# Patient Record
Sex: FEMALE | Race: WHITE | NOT HISPANIC OR LATINO | Employment: OTHER | URBAN - NONMETROPOLITAN AREA
[De-identification: names, ages, dates, MRNs, and addresses within clinical notes are randomized per-mention and may not be internally consistent; named-entity substitution may affect disease eponyms.]

---

## 2018-02-20 ENCOUNTER — HISTORICAL (OUTPATIENT)
Dept: ADMINISTRATIVE | Facility: HOSPITAL | Age: 83
End: 2018-02-20

## 2019-11-06 ENCOUNTER — HISTORICAL (OUTPATIENT)
Dept: ADMINISTRATIVE | Facility: HOSPITAL | Age: 84
End: 2019-11-06

## 2019-11-27 ENCOUNTER — OFFICE VISIT (OUTPATIENT)
Dept: UROLOGY | Facility: CLINIC | Age: 84
End: 2019-11-27
Payer: MEDICARE

## 2019-11-27 VITALS
RESPIRATION RATE: 16 BRPM | BODY MASS INDEX: 21.53 KG/M2 | DIASTOLIC BLOOD PRESSURE: 69 MMHG | SYSTOLIC BLOOD PRESSURE: 118 MMHG | WEIGHT: 117 LBS | HEIGHT: 62 IN

## 2019-11-27 DIAGNOSIS — N39.41 URGE INCONTINENCE: ICD-10-CM

## 2019-11-27 DIAGNOSIS — R31.29 HEMATURIA, MICROSCOPIC: Primary | ICD-10-CM

## 2019-11-27 PROCEDURE — 1159F PR MEDICATION LIST DOCUMENTED IN MEDICAL RECORD: ICD-10-PCS | Mod: S$GLB,,, | Performed by: NURSE PRACTITIONER

## 2019-11-27 PROCEDURE — 99213 OFFICE O/P EST LOW 20 MIN: CPT | Mod: S$GLB,,, | Performed by: NURSE PRACTITIONER

## 2019-11-27 PROCEDURE — 1159F MED LIST DOCD IN RCRD: CPT | Mod: S$GLB,,, | Performed by: NURSE PRACTITIONER

## 2019-11-27 PROCEDURE — 99213 PR OFFICE/OUTPT VISIT, EST, LEVL III, 20-29 MIN: ICD-10-PCS | Mod: S$GLB,,, | Performed by: NURSE PRACTITIONER

## 2019-11-27 RX ORDER — FUROSEMIDE 20 MG/1
TABLET ORAL
COMMUNITY
End: 2023-08-25

## 2019-11-27 RX ORDER — SERTRALINE HYDROCHLORIDE 50 MG/1
TABLET, FILM COATED ORAL
COMMUNITY
End: 2023-02-08

## 2019-11-27 RX ORDER — GLUCOSAMINE/CHONDROITIN/C/MANG 500-400 MG
CAPSULE ORAL
COMMUNITY
End: 2023-02-08

## 2019-11-27 RX ORDER — ROPINIROLE 1 MG/1
TABLET, FILM COATED ORAL
COMMUNITY
End: 2023-01-31 | Stop reason: SDUPTHER

## 2019-11-27 RX ORDER — NAPROXEN SODIUM 220 MG/1
TABLET, FILM COATED ORAL
COMMUNITY

## 2019-11-27 RX ORDER — BENZONATATE 100 MG/1
CAPSULE ORAL
Refills: 0 | COMMUNITY
Start: 2019-11-06 | End: 2023-06-13 | Stop reason: ALTCHOICE

## 2019-11-27 RX ORDER — CLONAZEPAM 1 MG/1
TABLET ORAL
COMMUNITY
End: 2023-08-15 | Stop reason: SINTOL

## 2019-11-27 RX ORDER — GLIMEPIRIDE 1 MG/1
TABLET ORAL
COMMUNITY
End: 2023-02-08

## 2019-11-27 RX ORDER — CLOPIDOGREL BISULFATE 75 MG/1
TABLET ORAL
COMMUNITY
End: 2023-07-27 | Stop reason: SINTOL

## 2019-11-27 RX ORDER — ZOLPIDEM TARTRATE 10 MG/1
TABLET ORAL
COMMUNITY
End: 2023-02-08

## 2019-11-27 RX ORDER — FENOFIBRATE 134 MG/1
CAPSULE ORAL
COMMUNITY
End: 2023-02-08

## 2019-11-27 NOTE — PROGRESS NOTES
Subjective:       Patient ID: Leatha Adames is a 84 y.o. female.    Chief Complaint: Other (Annual F/U-)      HPI: 84-year-old female presents for yearly re-evaluation.  Patient has a history of microscopic hematuria.  She has had a negative workup in the past.  Patient also has a history of urge incontinence.  She is on Toviaz.  States she is doing well.  Denies any pain or burning with urination.  Denies blood in her urine.  States her incontinence is controlled with Toviaz.  No other urinary complaints.  All other health problems are      Past Medical History:   Past Medical History:   Diagnosis Date    Anxiety and depression     CAD (coronary artery disease)     Elevated cholesterol     Microhematuria     Urge incontinence        Past Surgical Historical:   Past Surgical History:   Procedure Laterality Date    CHOLECYSTECTOMY      HEMORRHOID SURGERY      HYSTERECTOMY          Medications:   Medication List with Changes/Refills   Current Medications    ASPIRIN 81 MG CHEW    aspirin 81 mg chewable tablet   Chew 1 tablet every day by oral route.    BENZONATATE (TESSALON) 100 MG CAPSULE    TK 1 C PO Q 4 HOURS AS NEEDED FOR COUGH    CLONAZEPAM (KLONOPIN) 1 MG TABLET    clonazepam 1 mg tablet   Take 1 tablet every day by oral route at bedtime.    CLOPIDOGREL (PLAVIX) 75 MG TABLET    Plavix 75 mg tablet   Take 1 tablet every day by oral route.    FENOFIBRATE MICRONIZED (LOFIBRA) 134 MG CAP    Tricor 134 mg capsule   Take 1 capsule every day by oral route.    FESOTERODINE FUMARATE (TOVIAZ ORAL)    Take by mouth.    FUROSEMIDE (LASIX) 20 MG TABLET    furosemide 20 mg tablet   Take 1 tablet every day by oral route.    GLIMEPIRIDE (AMARYL) 1 MG TABLET    glimepiride 1 mg tablet   Take 1 tablet every day by oral route.    GLUCOSAMINE-CHONDROIT-VIT C-MN (GLUCOSAMINE 1500 COMPLEX) 500-400 MG CAPSULE    Glucosamine 1500 Complex 500 mg-400 mg capsule   1/2 tablet twice daily    ROPINIROLE (REQUIP) 1 MG TABLET     ropinirole 1 mg tablet   Take 1 tablet every day by oral route at bedtime.    SERTRALINE (ZOLOFT) 50 MG TABLET    sertraline 50 mg tablet   Take 1 tablet every day by oral route.    ZOLPIDEM (AMBIEN) 10 MG TAB    zolpidem 10 mg tablet   1 tablet by mouth at bedtime        Past Social History:   Social History     Socioeconomic History    Marital status:      Spouse name: Not on file    Number of children: Not on file    Years of education: Not on file    Highest education level: Not on file   Occupational History    Not on file   Social Needs    Financial resource strain: Not on file    Food insecurity:     Worry: Not on file     Inability: Not on file    Transportation needs:     Medical: Not on file     Non-medical: Not on file   Tobacco Use    Smoking status: Former Smoker   Substance and Sexual Activity    Alcohol use: Not Currently    Drug use: Not on file    Sexual activity: Not on file   Lifestyle    Physical activity:     Days per week: Not on file     Minutes per session: Not on file    Stress: Not on file   Relationships    Social connections:     Talks on phone: Not on file     Gets together: Not on file     Attends [employer] service: Not on file     Active member of club or organization: Not on file     Attends meetings of clubs or organizations: Not on file     Relationship status: Not on file   Other Topics Concern    Not on file   Social History Narrative    Not on file       Allergies:   Review of patient's allergies indicates:   Allergen Reactions    Ace inhibitors     Codeine     Hydrocodone-acetaminophen     Irbesartan     Penicillins         Family History: History reviewed. No pertinent family history.     Review of Systems:  Review of Systems   Constitutional: Negative for activity change and appetite change.   HENT: Negative for congestion and dental problem.    Respiratory: Negative for chest tightness and shortness of breath.    Cardiovascular: Negative for chest  pain.   Gastrointestinal: Negative for abdominal distention.   Genitourinary: Negative for decreased urine volume, difficulty urinating, dyspareunia, dysuria, enuresis, flank pain, frequency, genital sores, hematuria, pelvic pain and urgency.   Musculoskeletal: Negative for back pain and neck pain.   Neurological: Negative for dizziness.   Hematological: Negative for adenopathy.   Psychiatric/Behavioral: Negative for agitation, behavioral problems and confusion.       Physical Exam:  Physical Exam   Nursing note and vitals reviewed.  Constitutional: She is oriented to person, place, and time. She appears well-developed and well-nourished.   HENT:   Head: Normocephalic.   Cardiovascular: Normal rate, regular rhythm and normal heart sounds.    Pulmonary/Chest: Effort normal and breath sounds normal.   Abdominal: Soft. Bowel sounds are normal.   Neurological: She is alert and oriented to person, place, and time.   Skin: Skin is warm and dry.      Urinalysis:  White blood cells 0-5, trace leukocytes.  Red blood cells 0-3.    Assessment/Plan:   1.  Microscopic hematuria:  Negative workup in the past.  No intervention needed at this time.  Urge incontinence:  Patient is doing well on Toviaz.  Continue Toviaz as directed.  Will plan follow-up in 1 year, sooner if needed.  Problem List Items Addressed This Visit     None      Visit Diagnoses     Hematuria, microscopic    -  Primary    Relevant Orders    POCT Urinalysis (w/Micro Option)    Urge incontinence

## 2020-04-13 RX ORDER — FESOTERODINE FUMARATE 8 MG/1
8 TABLET, EXTENDED RELEASE ORAL DAILY
Qty: 90 TABLET | Refills: 1 | Status: SHIPPED | OUTPATIENT
Start: 2020-04-13 | End: 2020-10-13 | Stop reason: SDUPTHER

## 2020-10-15 RX ORDER — FESOTERODINE FUMARATE 8 MG/1
8 TABLET, EXTENDED RELEASE ORAL DAILY
Qty: 90 TABLET | Refills: 3 | Status: SHIPPED | OUTPATIENT
Start: 2020-10-15 | End: 2021-10-15

## 2021-04-23 LAB
ALBUMIN SERPL-MCNC: 4 G/DL (ref 3.4–5)
ALBUMIN/GLOB SERPL: 1.7 {RATIO}
ALP SERPL-CCNC: 24 U/L (ref 50–144)
ALT SERPL-CCNC: 12 U/L (ref 1–45)
ANION GAP SERPL CALC-SCNC: 7 MMOL/L (ref 7–16)
AST SERPL-CCNC: 30 U/L (ref 14–36)
BASOPHILS # BLD AUTO: 0.04 X10(3)/MCL (ref 0.01–0.08)
BASOPHILS NFR BLD AUTO: 0.9 % (ref 0.1–1.2)
BILIRUB SERPL-MCNC: 0.29 MG/DL (ref 0.1–1)
BUN SERPL-MCNC: 21 MG/DL (ref 7–20)
CALCIUM SERPL-MCNC: 7.6 MG/DL (ref 8.4–10.2)
CHLORIDE SERPL-SCNC: 110 MMOL/L (ref 94–110)
CHOLEST SERPL-MCNC: 111 MG/DL (ref 0–200)
CO2 SERPL-SCNC: 26 MMOL/L (ref 21–32)
CREAT SERPL-MCNC: 1.4 MG/DL (ref 0.52–1.04)
CREAT/UREA NIT SERPL: 15 (ref 12–20)
EOSINOPHIL # BLD AUTO: 0.3 X10(3)/MCL (ref 0.04–0.36)
EOSINOPHIL NFR BLD AUTO: 6.5 % (ref 0.7–7)
ERYTHROCYTE [DISTWIDTH] IN BLOOD BY AUTOMATED COUNT: 17.1 % (ref 11–14.5)
EST. AVERAGE GLUCOSE BLD GHB EST-MCNC: 84 MG/DL (ref 70–115)
GLOBULIN SER-MCNC: 2.3 G/DL (ref 2–3.9)
GLUCOSE SERPL-MCNC: 102 MGM./DL (ref 70–115)
HBA1C MFR BLD: 4.8 % (ref 4–6)
HCT VFR BLD AUTO: 31.8 % (ref 36–48)
HDLC SERPL-MCNC: 43 MG/DL (ref 40–60)
HGB BLD-MCNC: 9.5 G/DL (ref 11.8–16)
IMM GRANULOCYTES # BLD AUTO: 0 X10E3/UL (ref 0–0.03)
IMM GRANULOCYTES NFR BLD AUTO: 0 % (ref 0–0.5)
LDLC SERPL CALC-MCNC: 43.4 MG/DL (ref 30–100)
LYMPHOCYTES # BLD AUTO: 1.71 X10(3)/MCL (ref 1.16–3.74)
LYMPHOCYTES NFR BLD AUTO: 36.8 % (ref 20–55)
MCH RBC QN AUTO: 28.8 PG (ref 27–34)
MCHC RBC AUTO-ENTMCNC: 29.9 G/DL (ref 31–37)
MCV RBC AUTO: 96.4 FL (ref 79–99)
MONOCYTES # BLD AUTO: 0.39 X10(3)/MCL (ref 0.24–0.36)
MONOCYTES NFR BLD AUTO: 8.4 % (ref 4.7–12.5)
NEUTROPHILS # BLD AUTO: 2.21 X10(3)/MCL (ref 1.56–6.13)
NEUTROPHILS NFR BLD AUTO: 47.4 % (ref 37–73)
PLATELET # BLD AUTO: 289 X10(3)/MCL (ref 140–371)
PMV BLD AUTO: 9.9 FL (ref 9.4–12.4)
POTASSIUM SERPL-SCNC: 4.1 MMOL/L (ref 3.5–5.1)
PROT SERPL-MCNC: 6.3 G/DL (ref 6.3–8.2)
RBC # BLD AUTO: 3.3 X10(6)/MCL (ref 4–5.1)
SODIUM SERPL-SCNC: 143 MMOL/L (ref 135–145)
T4 FREE SERPL-MCNC: 0.71 NG/DL (ref 0.78–2.19)
TRIGL SERPL-MCNC: 130 MG/DL (ref 30–200)
TSH SERPL-ACNC: 2.71 UIU/ML (ref 0.36–3.74)
WBC # SPEC AUTO: 4.7 X10(3)/MCL (ref 4–11.5)

## 2021-05-25 ENCOUNTER — HISTORICAL (OUTPATIENT)
Dept: ADMINISTRATIVE | Facility: HOSPITAL | Age: 86
End: 2021-05-25

## 2021-12-15 ENCOUNTER — HISTORICAL (OUTPATIENT)
Dept: ADMINISTRATIVE | Facility: HOSPITAL | Age: 86
End: 2021-12-15

## 2021-12-22 ENCOUNTER — HISTORICAL (OUTPATIENT)
Dept: ADMINISTRATIVE | Facility: HOSPITAL | Age: 86
End: 2021-12-22

## 2021-12-25 ENCOUNTER — HISTORICAL (OUTPATIENT)
Dept: ADMINISTRATIVE | Facility: HOSPITAL | Age: 86
End: 2021-12-25

## 2021-12-27 ENCOUNTER — EXTERNAL HOSPITAL ADMISSION (OUTPATIENT)
Dept: ADMINISTRATIVE | Facility: CLINIC | Age: 86
End: 2021-12-27
Payer: MEDICARE

## 2021-12-27 ENCOUNTER — PATIENT OUTREACH (OUTPATIENT)
Dept: ADMINISTRATIVE | Facility: CLINIC | Age: 86
End: 2021-12-27
Payer: MEDICARE

## 2022-03-10 ENCOUNTER — TELEPHONE (OUTPATIENT)
Dept: GASTROENTEROLOGY | Facility: CLINIC | Age: 87
End: 2022-03-10
Payer: MEDICARE

## 2022-03-10 LAB
AGE: 85
ALBUMIN SERPL-MCNC: 4.4 G/DL (ref 3.4–5)
ALBUMIN/GLOB SERPL: 2 {RATIO}
ALP SERPL-CCNC: 26 U/L (ref 50–144)
ALT SERPL-CCNC: 15 U/L (ref 1–45)
ANION GAP SERPL CALC-SCNC: 6 MMOL/L (ref 2–13)
AST SERPL-CCNC: 34 U/L (ref 14–36)
BASOPHILS # BLD AUTO: 0.07 10*3/UL (ref 0.01–0.08)
BASOPHILS NFR BLD AUTO: 1 % (ref 0.1–1.2)
BILIRUB SERPL-MCNC: 0.39 MG/DL (ref 0–1)
BUN SERPL-MCNC: 30 MG/DL (ref 7–20)
CALCIUM SERPL-MCNC: 9.7 MG/DL (ref 8.4–10.2)
CHLORIDE SERPL-SCNC: 106 MMOL/L (ref 94–110)
CO2 SERPL-SCNC: 29 MMOL/L (ref 21–32)
CREAT SERPL-MCNC: 1.73 MG/DL (ref 0.52–1.04)
CREAT/UREA NIT SERPL: 17.3 (ref 12–20)
EOSINOPHIL # BLD AUTO: 0.58 10*3/UL (ref 0.04–0.36)
EOSINOPHIL NFR BLD AUTO: 8 % (ref 0.7–7)
ERYTHROCYTE [DISTWIDTH] IN BLOOD BY AUTOMATED COUNT: 13.8 % (ref 11–14.5)
EST. AVERAGE GLUCOSE BLD GHB EST-MCNC: 108 MG/DL (ref 70–115)
GLOBULIN SER-MCNC: 2.2 G/DL (ref 2–3.9)
GLUCOSE SERPL-MCNC: 138 MG/DL (ref 70–115)
HBA1C MFR BLD: 5.6 % (ref 4–6)
HCT VFR BLD AUTO: 27.8 % (ref 36–48)
HGB BLD-MCNC: 8.8 G/DL (ref 11.8–16)
IMM GRANULOCYTES # BLD AUTO: 0.01 10*3/UL (ref 0–0.03)
IMM GRANULOCYTES NFR BLD AUTO: 0.1 % (ref 0–0.5)
LYMPHOCYTES # BLD AUTO: 1.78 10*3/UL (ref 1.16–3.74)
LYMPHOCYTES NFR BLD AUTO: 24.7 % (ref 20–55)
MANUAL DIFF? (OHS): NORMAL
MCH RBC QN AUTO: 28.5 PG (ref 27–34)
MCHC RBC AUTO-ENTMCNC: 31.7 G/DL (ref 31–37)
MCV RBC AUTO: 90 FL (ref 79–99)
MONOCYTES # BLD AUTO: 0.61 10*3/UL (ref 0.24–0.36)
MONOCYTES NFR BLD AUTO: 8.4 % (ref 4.7–12.5)
NEUTROPHILS # BLD AUTO: 4.17 10*3/UL (ref 1.56–6.13)
NEUTROPHILS NFR BLD AUTO: 57.8 % (ref 37–73)
PLATELET # BLD AUTO: 250 10*3/UL (ref 140–371)
PMV BLD AUTO: 10 FL (ref 9.4–12.4)
POTASSIUM SERPL-SCNC: 4.8 MMOL/L (ref 3.5–5.1)
PROT SERPL-MCNC: 6.6 G/DL (ref 6.3–8.2)
RBC # BLD AUTO: 3.09 10*6/UL (ref 4–5.1)
SODIUM SERPL-SCNC: 141 MMOL/L (ref 135–145)
T4 FREE SERPL-MCNC: 0.72 NG/DL (ref 0.78–2.19)
TSH SERPL-ACNC: 3.55 M[IU]/L (ref 0.36–3.74)
WBC # SPEC AUTO: 7.2 10*3/UL (ref 4–11.5)

## 2022-03-10 NOTE — TELEPHONE ENCOUNTER
I spoke with Lexie to let them know that we are not seeing patients Monday afternoon. She states that this is fine and would like the patient called to schedule on another date.

## 2022-03-10 NOTE — TELEPHONE ENCOUNTER
----- Message from Tess Mcgee sent at 3/10/2022 11:12 AM CST -----  william/daughter states that pt can be in for 3:30, has 2p appt. is this okay..[phone]

## 2022-04-10 ENCOUNTER — HISTORICAL (OUTPATIENT)
Dept: ADMINISTRATIVE | Facility: HOSPITAL | Age: 87
End: 2022-04-10
Payer: MEDICARE

## 2022-04-25 VITALS
WEIGHT: 118.63 LBS | HEIGHT: 62 IN | BODY MASS INDEX: 21.83 KG/M2 | OXYGEN SATURATION: 97 % | DIASTOLIC BLOOD PRESSURE: 40 MMHG | SYSTOLIC BLOOD PRESSURE: 122 MMHG

## 2022-05-03 ENCOUNTER — HISTORICAL (OUTPATIENT)
Dept: ADMINISTRATIVE | Facility: HOSPITAL | Age: 87
End: 2022-05-03
Payer: MEDICARE

## 2022-05-03 NOTE — HISTORICAL [facility] CERNER
This is a historical note converted from [place]. Formatting and pictures may have been removed.  Please reference [place] for original formatting and attached multimedia. Chief Complaint  x3 days ago patient lifted heavy bag and heard pop in rt. elbow, still has pain  History of Present Illness  86-year-old?right-handed female?presents with complaints of right elbow pain.? She felt and heard a pop whenever she was?reaching for a pack of meat while at the grocery store.? She had some swelling and pain.? She has not lost any range of motion at the elbow.  Review of Systems  See HPI  Physical Exam  Vitals & Measurements  T:?36.6? ?C (Oral)? HR:?71(Peripheral)? BP:?154/72? SpO2:?99%?  HT:?168.00?cm? WT:?54.700?kg? BMI:?19.38?  General: Awake, alert, no acute distress  Right?arm:?Distal biceps tendon intact?but mild tenderness to palpation.? Tenderness to palpation?over the brachial radialis?proximal?insertion.? Pronation and supination equal?to the left?with?pain?with resisted?supination.? Tenderness to palpation over the medial epicondyle.  ?  X-ray right elbow:?Nondisplaced avulsion of?the medial epicondyles. ?Otherwise, unremarkable.  Assessment/Plan  1.?Right elbow pain ? M25.521  2.?Strain of right elbow ? S46.645W  3.?Nondisplaced avulsion fracture of medial epicondyle of right humerus ? S42.354A  Sling for right elbow  Ice  Return to clinic in 2?weeks.   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  Procedure/Surgical History  Colonoscopy (05/14/2021)  Esophagogastroduodenoscopy with  dilatation of esophagus (05/14/2021)  epidurol steroid injection (06.2020)  Colonoscopy (02/04/2019)  Peripheral arterial angioplasty (09/12/2018)  Esophagogastroduodenoscopy (08/20/2018)  Percutaneous transluminal coronary angioplasty (08/01/2017)  Cardiac catheterization (07/24/2008)  Cystocele repair (2001)  Mastectomy (1995)  Hemorrhoidectomy (1990)  Hysterectomy (1973)  Cholecystectomy (1958)   Medications  acetaminophen 500 mg oral tablet, 1000 mg= 2 tab(s), Oral, q6hr  aspirin 81 mg oral Delayed Release (EC) tablet, 81 mg= 1 tab(s), Oral, Daily  atorvastatin 40 mg oral tablet, 40 mg= 1 tab(s), Oral, Daily, 3 refills  calcium-vitamin D 600 mg-400 intl units oral tablet, 1 tab(s), Oral, Daily  carvedilol 3.125 mg oral tablet, See Instructions  clonazePAM 1 mg oral tablet, 1 mg= 1 tab(s), Oral, Once a day (at bedtime), 3 refills  clopidogrel 75 mg oral tablet, 75 mg= 1 tab(s), Oral, Daily, 3 refills  fenofibrate 160 mg oral tablet, 160 mg= 1 tab(s), Oral, Daily, 3 refills  fluticasone 93 mcg/inh nasal spray, 1 spray(s), Nasal, BID  furosemide 20 mg oral tablet, 20 mg= 1 tab(s), Oral, Daily  loratadine 10 mg oral tablet, 10 mg= 1 tab(s), Oral, Daily  magnesium oxide 400 mg oral tablet, 800 mg= 2 tab(s), Oral, BID, 3 refills  Nitrostat 0.4 mg sublingual tablet, 0.4 mg= 1 tab(s), SL, q5min, PRN, 2 refills  ondansetron 8 mg oral tablet, 8 mg= 1 tab(s), Oral, q8hr, PRN, 5 refills  Pantoprazole 40 mg ORAL EC-Tablet, 40 mg= 1 tab(s), Oral, Daily, 3 refills  ProAir HFA 90 mcg/inh inhalation aerosol with adapter, 1-2 puffs, INH, q4hr  ropinirole 1 mg oral tablet, 2 mg= 2 tab(s), Oral, Once a day (at bedtime), 3 refills  Systane ophthalmic solution, 1 drop, Eye-Both, Once a day (at bedtime)  Toviaz 8 mg oral tablet, extended release, 8 mg= 1 tab(s), Oral, Daily, 3 refills  traMADol 50 mg oral tablet, 100 mg= 2 tab(s), Oral, q12hr, PRN, 3 refills  venlafaxine 75 mg oral capsule, extended release, 75 mg= 1 cap(s), Oral,  Daily, 3 refills  Vitamin D 1000 intl units oral tablet  vitamin E 1000 intl units oral capsule, 1000 IntUnit= 1 cap(s), Oral, Daily  Allergies  ACE inhibitors?(Unknown)  Augmentin?(Unknown)  Avapro?(Unknown)  CeleBREX?(Unknown)  Social History  Abuse/Neglect  No, 12/15/2021  Tobacco  Never (less than 100 in lifetime), N/A, 12/15/2021  Family History  Family history is negative

## 2022-07-19 NOTE — PROGRESS NOTES
Clinic Note    Reason for visit:  The primary encounter diagnosis was Chronic diarrhea. Diagnoses of Gastroesophageal reflux disease, unspecified whether esophagitis present, Weight loss, unintentional, and Diarrhea, unspecified type were also pertinent to this visit.    PCP: Dennys Linares       HPI:  This is a 87 y.o. female who has been having diarrhea for the past few months.  It seems to significantly be affecting her.  Nausea may keep her oral intake down.  She has lost a few lb since I last saw her.  She complains of reflux been going on for more than a few months.  Son and daughter-in-law are present.  Daughter Lexie is on the phone.  She is already on pantoprazole 40 mg daily.    Review of Systems   Constitutional: Positive for unexpected weight change. Negative for chills, diaphoresis, fatigue and fever.   HENT: Negative for mouth sores, nosebleeds, postnasal drip, sore throat, trouble swallowing and voice change.    Eyes: Positive for eye dryness. Negative for pain and discharge.   Respiratory: Positive for shortness of breath. Negative for apnea, cough, choking, chest tightness and wheezing.    Cardiovascular: Negative for chest pain, palpitations, leg swelling and claudication.   Gastrointestinal: Positive for diarrhea and reflux. Negative for abdominal distention, abdominal pain, anal bleeding, blood in stool, change in bowel habit, constipation, nausea, rectal pain, vomiting, fecal incontinence and change in bowel habit.   Genitourinary: Negative for bladder incontinence, difficulty urinating, dysuria, flank pain, frequency and hematuria.   Musculoskeletal: Positive for back pain. Negative for arthralgias, joint swelling and joint deformity.   Integumentary:  Negative for color change, rash and wound.   Allergic/Immunologic: Negative for environmental allergies and food allergies.   Neurological: Positive for speech difficulty and memory loss. Negative for seizures, facial asymmetry, weakness  and headaches.   Hematological: Negative for adenopathy. Bruises/bleeds easily.   Psychiatric/Behavioral: Positive for agitation and confusion. Negative for behavioral problems, hallucinations and sleep disturbance.      Past Medical History:   Diagnosis Date    Anxiety and depression     CAD (coronary artery disease)     CKD (chronic kidney disease) stage 4, GFR 15-29 ml/min     Elevated cholesterol     High blood sugar     history of    History of blood clots     History of chest pain     Hypertension     Microhematuria     Restless leg syndrome     Urge incontinence      Past Surgical History:   Procedure Laterality Date    CHOLECYSTECTOMY      HEMORRHOID SURGERY      HYSTERECTOMY       History reviewed. No pertinent family history.  Social History     Tobacco Use    Smoking status: Former Smoker    Smokeless tobacco: Never Used   Substance Use Topics    Alcohol use: Not Currently     Review of patient's allergies indicates:   Allergen Reactions    Ace inhibitors     Codeine     Hydrocodone-acetaminophen     Irbesartan     Penicillins         Medication List with Changes/Refills   New Medications    PANTOPRAZOLE (PROTONIX) 40 MG TABLET    Take 1 tablet (40 mg total) by mouth once daily.   Current Medications    ACETAMINOPHEN (TYLENOL) 500 MG TABLET    Take 1,000 mg by mouth every 8 (eight) hours as needed for Pain.    ASPIRIN 81 MG CHEW    aspirin 81 mg chewable tablet   Chew 1 tablet every day by oral route.    BENZONATATE (TESSALON) 100 MG CAPSULE    TK 1 C PO Q 4 HOURS AS NEEDED FOR COUGH    CLONAZEPAM (KLONOPIN) 1 MG TABLET    clonazepam 1 mg tablet   Take 1 tablet every day by oral route at bedtime.    CLOPIDOGREL (PLAVIX) 75 MG TABLET    Plavix 75 mg tablet   Take 1 tablet every day by oral route.    FENOFIBRATE MICRONIZED (LOFIBRA) 134 MG CAP    Tricor 134 mg capsule   Take 1 capsule every day by oral route.    FESOTERODINE FUMARATE (TOVIAZ ORAL)    Take by mouth.    FIBER CHOICE ORAL  "   Take by mouth 4 (four) times daily as needed.    FUROSEMIDE (LASIX) 20 MG TABLET    furosemide 20 mg tablet   Take 1 tablet every day by oral route.    GLIMEPIRIDE (AMARYL) 1 MG TABLET    glimepiride 1 mg tablet   Take 1 tablet every day by oral route.    GLUCOSAMINE-CHONDROIT-VIT C--400 MG CAPSULE    Glucosamine 1500 Complex 500 mg-400 mg capsule   1/2 tablet twice daily    METOPROLOL SUCCINATE (TOPROL-XL) 50 MG 24 HR TABLET    Take 50 mg by mouth once daily.    NITROGLYCERIN (NITROSTAT) 0.4 MG SL TABLET    Place 0.4 mg under the tongue.    ROPINIROLE (REQUIP) 1 MG TABLET    ropinirole 1 mg tablet   Take 1 tablet every day by oral route at bedtime.    SERTRALINE (ZOLOFT) 50 MG TABLET    sertraline 50 mg tablet   Take 1 tablet every day by oral route.    TRAMADOL (ULTRAM) 50 MG TABLET    TAKE 2 TABLETS BY MOUTH EVERY TWELVE HOURS AS NEEDED FOR PAIN    VENLAFAXINE (EFFEXOR-XR) 75 MG 24 HR CAPSULE    Take 75 mg by mouth once daily.    ZOLPIDEM (AMBIEN) 10 MG TAB    zolpidem 10 mg tablet   1 tablet by mouth at bedtime         Vital Signs:  BP (!) 158/67 (BP Location: Left arm, Patient Position: Sitting, BP Method: Large (Automatic))   Pulse 73   Ht 5' 3" (1.6 m)   Wt 51.3 kg (113 lb)   BMI 20.02 kg/m²         Physical Exam  Vitals reviewed.   Constitutional:       General: She is awake. She is not in acute distress.     Appearance: Normal appearance. She is well-developed. She is not ill-appearing, toxic-appearing or diaphoretic.   HENT:      Head: Normocephalic and atraumatic.      Nose: Nose normal.      Mouth/Throat:      Mouth: Mucous membranes are moist.      Pharynx: Oropharynx is clear. No oropharyngeal exudate or posterior oropharyngeal erythema.   Eyes:      General: Lids are normal. Gaze aligned appropriately. No scleral icterus.        Right eye: No discharge.         Left eye: No discharge.      Extraocular Movements: Extraocular movements intact.      Conjunctiva/sclera: Conjunctivae normal. "   Neck:      Trachea: Trachea normal.   Cardiovascular:      Rate and Rhythm: Normal rate and regular rhythm.      Pulses:           Radial pulses are 2+ on the right side and 2+ on the left side.   Pulmonary:      Effort: Pulmonary effort is normal. No respiratory distress.      Breath sounds: Normal breath sounds. No stridor. No wheezing or rhonchi.   Chest:      Chest wall: No tenderness.   Abdominal:      General: Bowel sounds are normal. There is no distension.      Palpations: Abdomen is soft. There is no fluid wave, hepatomegaly or mass.      Tenderness: There is no abdominal tenderness. There is no guarding or rebound.   Musculoskeletal:         General: No tenderness or deformity.      Cervical back: Full passive range of motion without pain and neck supple. No tenderness.      Right lower leg: No edema.      Left lower leg: No edema.   Lymphadenopathy:      Cervical: No cervical adenopathy.   Skin:     General: Skin is warm and dry.      Capillary Refill: Capillary refill takes less than 2 seconds.      Coloration: Skin is not cyanotic, jaundiced or pale.      Findings: No rash.   Neurological:      General: No focal deficit present.      Mental Status: She is alert and oriented to person, place, and time.      Cranial Nerves: No facial asymmetry.      Motor: No tremor.   Psychiatric:         Attention and Perception: Attention normal.         Mood and Affect: Mood and affect normal.         Speech: Speech normal.         Behavior: Behavior normal. Behavior is cooperative.            All of the data above and below has been reviewed by myself and any further interpretations will be reflected in the assessment and plan.   The data includes review of external notes, and independent interpretation of lab results, procedures, x-rays, and imaging reports.      Assessment:  Chronic diarrhea  -     CT Abdomen Pelvis  Without Contrast; Future    Gastroesophageal reflux disease, unspecified whether esophagitis  present  -     pantoprazole (PROTONIX) 40 MG tablet; Take 1 tablet (40 mg total) by mouth once daily.  Dispense: 90 tablet; Refill: 4  -     CT Abdomen Pelvis  Without Contrast; Future    Weight loss, unintentional  -     CT Abdomen Pelvis  Without Contrast; Future    Diarrhea, unspecified type  -     CT Abdomen Pelvis  Without Contrast; Future           At this point the patient had a fairly recent upper and lower endoscopy.  Tumor seems much less likely on the differential.  She did have 3 tubular adenomas that were removed.  I would like to try to minimize interventions been ensure there is no harm occurring.  Begin using squatty potty.  Will get CT scan of the abdomen and pelvis with oral contrast.  No IV contrast given stage 3 kidney disease.  In the meantime we will try to control her symptoms with that low of a dose of Imodium as she needs.    Next step would be stool testing if otherwise unrevealing and if the loperamide is not helping.  I agree with the fiber for now.  I agree with holding the magnesium for now.    Recommendations:  Begin loperamide 2 mg by mouth twice a day and decrease dose if stool becomes firm or hard.    Schedule CT scan.    Begin using Squatty Potty with each restroom visit at home.      Please notify my office if you have not been contacted within two weeks after any procedures, submitting any samples (biopsies, blood, stool, urine, etc.) or after any imaging (X-ray, CT, MRI, etc.).     Risks, benefits, and alternatives of medical management, any associated procedures, and/or treatment discussed with the patient. Patient given opportunity to ask questions and voices understanding. Patient has elected to proceed with the recommended care modalities as discussed.    No follow-ups on file.    Order summary:  Orders Placed This Encounter   Procedures    CT Abdomen Pelvis  Without Contrast        Instructed patient to notify my office if they have not been contacted within two weeks  after any procedures, submitting any samples (biopsies, blood, stool, urine, etc.) or after any imaging (X-ray, CT, MRI, etc.).     Yvette Morgan MD    This document may have been created using a voice recognition transcribing system. Incorrect words or phrases may have been missed during proofreading. Please interpret accordingly or contact me for clarification.

## 2022-07-20 ENCOUNTER — OFFICE VISIT (OUTPATIENT)
Dept: GASTROENTEROLOGY | Facility: CLINIC | Age: 87
End: 2022-07-20
Payer: MEDICARE

## 2022-07-20 VITALS
HEIGHT: 63 IN | DIASTOLIC BLOOD PRESSURE: 67 MMHG | SYSTOLIC BLOOD PRESSURE: 158 MMHG | HEART RATE: 73 BPM | WEIGHT: 113 LBS | BODY MASS INDEX: 20.02 KG/M2

## 2022-07-20 DIAGNOSIS — K21.9 GASTROESOPHAGEAL REFLUX DISEASE, UNSPECIFIED WHETHER ESOPHAGITIS PRESENT: ICD-10-CM

## 2022-07-20 DIAGNOSIS — K52.9 CHRONIC DIARRHEA: Primary | ICD-10-CM

## 2022-07-20 DIAGNOSIS — R19.7 DIARRHEA, UNSPECIFIED TYPE: ICD-10-CM

## 2022-07-20 DIAGNOSIS — R63.4 WEIGHT LOSS, UNINTENTIONAL: ICD-10-CM

## 2022-07-20 PROCEDURE — 99203 PR OFFICE/OUTPT VISIT, NEW, LEVL III, 30-44 MIN: ICD-10-PCS | Mod: S$GLB,,, | Performed by: INTERNAL MEDICINE

## 2022-07-20 PROCEDURE — 99203 OFFICE O/P NEW LOW 30 MIN: CPT | Mod: S$GLB,,, | Performed by: INTERNAL MEDICINE

## 2022-07-20 RX ORDER — TRAMADOL HYDROCHLORIDE 50 MG/1
TABLET ORAL
COMMUNITY
Start: 2022-07-15 | End: 2024-01-02

## 2022-07-20 RX ORDER — METOPROLOL SUCCINATE 50 MG/1
50 TABLET, EXTENDED RELEASE ORAL DAILY
COMMUNITY
Start: 2022-04-04 | End: 2023-02-08

## 2022-07-20 RX ORDER — NITROGLYCERIN 0.4 MG/1
0.4 TABLET SUBLINGUAL
COMMUNITY
Start: 2021-05-27

## 2022-07-20 RX ORDER — VENLAFAXINE HYDROCHLORIDE 75 MG/1
75 CAPSULE, EXTENDED RELEASE ORAL DAILY
COMMUNITY
End: 2023-05-08 | Stop reason: SDUPTHER

## 2022-07-20 RX ORDER — ACETAMINOPHEN 500 MG
1000 TABLET ORAL EVERY 8 HOURS PRN
COMMUNITY

## 2022-07-20 RX ORDER — PANTOPRAZOLE SODIUM 40 MG/1
40 TABLET, DELAYED RELEASE ORAL DAILY
Qty: 90 TABLET | Refills: 4 | Status: SHIPPED | OUTPATIENT
Start: 2022-07-20 | End: 2023-02-14 | Stop reason: SDUPTHER

## 2022-07-20 NOTE — LETTER
July 20, 2022        Dennys Linares MD  [address]  [address]   [place] [address]             [facility]  [address] [address]  Phone: [phone]  Fax: [phone]   Patient: Leatha Adames   MR Number: [id number]   YOB: 1935   Date of Visit: 7/20/2022       Dear Dr. Linares:    Thank you for referring Leatha Adames to me for evaluation. Attached you will find relevant portions of my assessment and plan of care.    If you have questions, please do not hesitate to call me. I look forward to following Leatha Adames along with you.    Sincerely,      Yvette Morgan MD            CC  No Recipients    Enclosure

## 2022-07-20 NOTE — PATIENT INSTRUCTIONS
Begin using Squatty Potty with each restroom visit at home.      Please notify my office if you have not been contacted within two weeks after any procedures, submitting any samples (biopsies, blood, stool, urine, etc.) or after any imaging (X-ray, CT, MRI, etc.).

## 2022-08-12 ENCOUNTER — TELEPHONE (OUTPATIENT)
Dept: GASTROENTEROLOGY | Facility: CLINIC | Age: 87
End: 2022-08-12
Payer: MEDICARE

## 2022-08-12 NOTE — TELEPHONE ENCOUNTER
CT AP w/o: wnl.  Notify patient that her CT look well (although we know it has some limitations without contrast).  How are her GI Sx with the interventions we discussed in our last OV? Let me know.  ABRIL

## 2022-08-12 NOTE — TELEPHONE ENCOUNTER
"(Begin loperamide 2 mg by mouth twice a day and decrease dose if stool becomes firm or hard.   Schedule CT scan.   Begin using Squatty Potty with each restroom visit at home)         I spoke with the patient and her daughter.  The patient reports that she is using her squatty potty and taking imodium bid.  She reports that she is having one "normal" BM every morning, then 2 watery BMs later in the day.  She states that this is much better than she was doing before seeing Dr. Morgan. I let her know that someone would contact her if Dr. Morgan had any further recommendations.  KDL, CMA  "

## 2022-08-13 NOTE — TELEPHONE ENCOUNTER
Recommend making next available follow-up office visit with me in notify me sooner if any issues.  NBP

## 2022-09-19 NOTE — TELEPHONE ENCOUNTER
----- Message from Tess Mcgee sent at 9/19/2022 10:03 AM CDT -----  brittany//daughter states that pt diahrrhea has gotten worse. please advise...[phone] (home)

## 2022-09-19 NOTE — TELEPHONE ENCOUNTER
Okay to try cholestyramine once daily for 1 week.  If does not help after that time that unlikely to help and may discontinue it.  Otherwise she should be taking fiber and slowly increase the dose as well as the Imodium twice a day and squatty potty use.  Send me an update on the patient in 1-2 weeks.  ABRIL

## 2022-09-19 NOTE — TELEPHONE ENCOUNTER
Patient daughter called stated patient is taking imodium 2 a day as advised by you with no results patient still having diarrhea. Daughter found that patient does have cholestyramine on hand prescribed by another doctor at some point. Medicine exp. Is not until 05/2023 so daughter wants to know if she can try this med. Said her Aunt is also on this and says it works great for diarrhea.

## 2022-09-20 NOTE — TELEPHONE ENCOUNTER
Spoke to patients daughter Arleth gave instructions per Dr. Morgan prior message. Daughter verbalized understanding.

## 2022-09-28 NOTE — TELEPHONE ENCOUNTER
Pt has been using the squatty potty. Pt has switched cholestyramine to 1 pack BID because that is what the directions say on the box.   Pt has only had diarrhea once this AM since switching to BID.

## 2022-09-29 NOTE — TELEPHONE ENCOUNTER
I called pt's phone to discuss recommendations. No answer. LVM asking her to call our office back.

## 2022-09-29 NOTE — TELEPHONE ENCOUNTER
That is good she is getting some relief. The next step is to time the cholestyramine to avoid her other medicines two hours before and 4 hours after the cholestyramine as it may affect how some of her other medicines are absorbed.  There is also a tablet version of cholestyramine if she prefers.  I would still recommend the fiber with the goal of minimizing cholestyramine use given the specifics on its timing with other medicines in the longterm. Keep her follow up OV and notify me sooner if any issues arise.  ABRIL

## 2022-10-03 NOTE — TELEPHONE ENCOUNTER
Called placed again to pt's phone to discuss recommendations. No answer. LVM. Will attempt to call again at a later date. If no answer will send letter to pt.

## 2022-10-03 NOTE — TELEPHONE ENCOUNTER
Reached out to pt daughter, Lisset, that was returning our call. Reviewed NBP instructions per previous note. Daughter voiced understanding. She states pt would prefer the tablet version and would like them sent to [facility]. Please confirm timing of when tablets should be taken compared to the powder. - VL

## 2022-10-05 RX ORDER — MONTELUKAST SODIUM 4 MG/1
1 TABLET, CHEWABLE ORAL 2 TIMES DAILY
Qty: 180 TABLET | Refills: 0 | Status: SHIPPED | OUTPATIENT
Start: 2022-10-05 | End: 2023-02-08

## 2022-10-05 RX ORDER — MONTELUKAST SODIUM 4 MG/1
1 TABLET, CHEWABLE ORAL 2 TIMES DAILY
Qty: 180 TABLET | Refills: 0 | Status: SHIPPED | OUTPATIENT
Start: 2022-10-05 | End: 2022-10-05 | Stop reason: SDUPTHER

## 2022-10-05 NOTE — TELEPHONE ENCOUNTER
Colestipol BID was sent to pharmacy.  Same medicine timing restrictions as cholestyramine (see order).    NBP

## 2023-01-17 DIAGNOSIS — D63.1 ANEMIA OF CHRONIC RENAL FAILURE, STAGE 4 (SEVERE): ICD-10-CM

## 2023-01-17 DIAGNOSIS — N18.4 ANEMIA OF CHRONIC RENAL FAILURE, STAGE 4 (SEVERE): ICD-10-CM

## 2023-01-17 DIAGNOSIS — N18.4 CHRONIC KIDNEY DISEASE, STAGE IV (SEVERE): ICD-10-CM

## 2023-01-17 PROBLEM — N18.9 ANEMIA OF CHRONIC RENAL FAILURE: Status: ACTIVE | Noted: 2023-01-17

## 2023-01-31 ENCOUNTER — TELEPHONE (OUTPATIENT)
Dept: FAMILY MEDICINE | Facility: CLINIC | Age: 88
End: 2023-01-31
Payer: MEDICARE

## 2023-01-31 DIAGNOSIS — G25.81 RLS (RESTLESS LEGS SYNDROME): Primary | ICD-10-CM

## 2023-01-31 RX ORDER — ROPINIROLE 1 MG/1
TABLET, FILM COATED ORAL
Status: CANCELLED | OUTPATIENT
Start: 2023-01-31

## 2023-01-31 RX ORDER — ROPINIROLE 1 MG/1
2 TABLET, FILM COATED ORAL NIGHTLY
Qty: 90 TABLET | Refills: 3 | Status: SHIPPED | OUTPATIENT
Start: 2023-01-31 | End: 2023-08-07

## 2023-01-31 NOTE — TELEPHONE ENCOUNTER
----- Message from Lindsey Dixon sent at 1/31/2023  2:40 PM CST -----  Regarding: refill  Refill on   ropinirole 1 mg oral tablet sent to [facility] [phone]  [phone]

## 2023-01-31 NOTE — TELEPHONE ENCOUNTER
----- Message from Lindsey Dixon sent at 1/31/2023  2:40 PM CST -----  Regarding: refill  Refill on   ropinirole 1 mg oral tablet [facility] to [facility] [phone]  [phone]

## 2023-01-31 NOTE — TELEPHONE ENCOUNTER
----- Message from Lindsey Dixon sent at 1/31/2023  2:40 PM CST -----  Regarding: refill  Refill on   ropinirole 1 mg oral tablet sent to [facility] [phone]  [phone]

## 2023-02-06 ENCOUNTER — HOSPITAL ENCOUNTER (EMERGENCY)
Facility: HOSPITAL | Age: 88
Discharge: HOME | End: 2023-02-06
Attending: FAMILY MEDICINE
Payer: MEDICARE

## 2023-02-06 VITALS
HEIGHT: 60 IN | OXYGEN SATURATION: 98 % | RESPIRATION RATE: 18 BRPM | DIASTOLIC BLOOD PRESSURE: 57 MMHG | TEMPERATURE: 98 F | SYSTOLIC BLOOD PRESSURE: 150 MMHG | WEIGHT: 110 LBS | BODY MASS INDEX: 21.6 KG/M2 | HEART RATE: 54 BPM

## 2023-02-06 DIAGNOSIS — S00.83XA CONTUSION OF FOREHEAD, INITIAL ENCOUNTER: ICD-10-CM

## 2023-02-06 DIAGNOSIS — S01.01XA LACERATION OF SCALP, INITIAL ENCOUNTER: Primary | ICD-10-CM
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
## 2023-02-06 DIAGNOSIS — S00.03XA CONTUSION OF SCALP, INITIAL ENCOUNTER: ICD-10-CM

## 2023-02-06 PROCEDURE — 25000003 PHARM REV CODE 250

## 2023-02-06 PROCEDURE — 12002 RPR S/N/AX/GEN/TRNK2.6-7.5CM: CPT

## 2023-02-06 PROCEDURE — 99284 EMERGENCY DEPT VISIT MOD MDM: CPT | Mod: 25

## 2023-02-06 RX ORDER — LIDOCAINE HYDROCHLORIDE 10 MG/ML
INJECTION INFILTRATION; PERINEURAL
Status: COMPLETED
Start: 2023-02-06 | End: 2023-02-06

## 2023-02-06 RX ADMIN — LIDOCAINE HYDROCHLORIDE: 10 INJECTION, SOLUTION INFILTRATION; PERINEURAL at 03:02

## 2023-02-06 NOTE — ED PROVIDER NOTES
Encounter Date: 2/6/2023       History     Chief Complaint   Patient presents with    Fall     Laceration to head no lost of loc 5cm lac to head pt on plavix     87 y/o WF presents to ER past tripping on carpet and hitting head on step stool causing a large scalp laceration. Denies LOC. Denies any other injuries. No N,V or visual c/o.Denies neck pain. No decreased sensation or strength.     The history is provided by the patient.   Review of patient's allergies indicates:   Allergen Reactions    Ace inhibitors     Codeine     Hydrocodone-acetaminophen     Irbesartan     Penicillins      Past Medical History:   Diagnosis Date    Anxiety and depression     CAD (coronary artery disease)     CKD (chronic kidney disease) stage 4, GFR 15-29 ml/min     Elevated cholesterol     High blood sugar     history of    History of blood clots     History of chest pain     Hypertension     Microhematuria     Restless leg syndrome     Urge incontinence      Past Surgical History:   Procedure Laterality Date    CHOLECYSTECTOMY      HEMORRHOID SURGERY      HYSTERECTOMY       No family history on file.  Social History     Tobacco Use    Smoking status: Former    Smokeless tobacco: Never   Substance Use Topics    Alcohol use: Not Currently     Review of Systems   HENT:          5.5 cm scalp laceration to left fronto-parietal region with bleeding. Alos with contusion with mild swelling and tenderness to right supraorbital region. Skin intact.    Respiratory:  Negative for shortness of breath.    Cardiovascular:  Negative for chest pain.   Musculoskeletal:  Negative for neck pain.   Skin:  Positive for wound.   Neurological:  Negative for dizziness, weakness and numbness.   All other systems reviewed and are negative.    Physical Exam     Initial Vitals   BP Pulse Resp Temp SpO2   -- -- -- -- --      MAP       --         Physical Exam    Constitutional: She appears well-developed and well-nourished.   HENT:   Head: Normocephalic.   Right  Ear: External ear normal.   Left Ear: External ear normal.   Nose: Nose normal.   Mouth/Throat: Oropharynx is clear and moist.   5.5 cm left fronto-parietal region laceration with some bleeding. Right supraorbital contusion with mild swelling and tenderness. Skin intact.    Eyes: EOM are normal. Pupils are equal, round, and reactive to light.   S/P cataract surgery O.U.    Neck: Neck supple.   Normal range of motion.  Cardiovascular:  Normal rate, regular rhythm and normal heart sounds.           Pulmonary/Chest: Breath sounds normal.   Abdominal: Abdomen is soft. Bowel sounds are normal. There is no abdominal tenderness. There is no rebound and no guarding.   Musculoskeletal:         General: No tenderness. Normal range of motion.      Cervical back: Normal range of motion and neck supple.     Neurological: She is alert and oriented to person, place, and time. She has normal strength.   Skin: Skin is warm and dry.   Psychiatric: She has a normal mood and affect. Thought content normal.       ED Course   Lac Repair    Date/Time: 2/6/2023 2:27 AM  Performed by: Jay Salazar MD  Authorized by: Jay Salazar MD     Consent:     Consent obtained:  Verbal    Consent given by:  Patient    Risks discussed:  Infection, pain and poor wound healing  Anesthesia:     Anesthesia method:  Local infiltration    Local anesthetic:  Lidocaine 1% WITH epi  Laceration details:     Location:  Scalp    Scalp location:  L parietal    Length (cm):  5.5    Depth (mm):  1  Pre-procedure details:     Preparation:  Patient was prepped and draped in usual sterile fashion  Exploration:     Limited defect created (wound extended): yes      Hemostasis achieved with:  Epinephrine    Imaging obtained: x-ray      Imaging outcome: foreign body not noted      Wound exploration: entire depth of wound visualized      Contaminated: no    Treatment:     Area cleansed with:  Saline    Visualized foreign bodies/material removed: no      Debridement:   None    Undermining:  None  Skin repair:     Repair method:  Staples    Number of staples:  18  Approximation:     Approximation:  Close  Repair type:     Repair type:  Intermediate  Post-procedure details:     Dressing:  Open (no dressing)    Procedure completion:  Tolerated well, no immediate complications  Labs Reviewed - No data to display       Imaging Results              CT Head Without Contrast (Final result)  Result time 02/06/23 03:29:49      Final result by Albert Pardo MD (02/06/23 03:29:49)                   Impression:      1.  Scalp cephalohematoma.  No acute intracranial abnormality.      Electronically signed by: Albert Pardo  Date:    02/06/2023  Time:    03:29               Narrative:    EXAMINATION:  CT HEAD WITHOUT CONTRAST    CLINICAL HISTORY:  Head trauma, GCS=15, scalp hematoma (Ped 0-1y);Facial trauma, blunt;    COMPARISON:  09/23/2017    FINDINGS:  A scalp cephalohematoma is noted on the left side of the vertex.  The bony calvarium appears intact.  Mild generalized atrophy is present and there are faint areas of decreased attenuation involving the periventricular deep white matter on both sides compatible with mild chronic ischemic disease.  No acute intracranial hemorrhage, dominant wedge shaped infarct, or mass effect is seen. No abnormal extra-axial focal fluid collection is identified. The ventricles are normal in size and position.  Gray-white differentiation is well-maintained.  The posterior fossa is grossly unremarkable. No abnormal enhancement is seen following contrast.    The visualized paranasal sinuses and mastoids are clear.  .                                       Medications   LIDOcaine HCL 10 mg/ml (1%) 10 mg/mL (1 %) injection (has no administration in time range)                              Clinical Impression:   Final diagnoses:  [S01.01XA] Laceration of scalp, initial encounter (Primary)  [W19.XXXA] Fall, initial encounter  [S00.03XA] Contusion of scalp, initial  encounter  [S00.83XA] Contusion of forehead, initial encounter        ED Disposition Condition    Discharge Stable          ED Prescriptions    None       Follow-up Information       Follow up With Specialties Details Why Contact Info    Dennys Linares MD Family Medicine In 2 days As needed, If symptoms worsen [address] [facility] [id number]  [phone]               Jay Salazar MD  02/06/23 [id number]

## 2023-02-07 ENCOUNTER — CLINICAL SUPPORT (OUTPATIENT)
Dept: INFUSION THERAPY | Facility: HOSPITAL | Age: 88
End: 2023-02-07
Attending: FAMILY MEDICINE
Payer: MEDICARE

## 2023-02-07 VITALS
OXYGEN SATURATION: 97 % | RESPIRATION RATE: 18 BRPM | HEART RATE: 64 BPM | SYSTOLIC BLOOD PRESSURE: 135 MMHG | DIASTOLIC BLOOD PRESSURE: 51 MMHG | TEMPERATURE: 99 F

## 2023-02-07 DIAGNOSIS — D63.1 ANEMIA OF CHRONIC RENAL FAILURE, STAGE 4 (SEVERE): ICD-10-CM

## 2023-02-07 DIAGNOSIS — N18.4 ANEMIA OF CHRONIC RENAL FAILURE, STAGE 4 (SEVERE): ICD-10-CM

## 2023-02-07 DIAGNOSIS — N18.4 CHRONIC KIDNEY DISEASE, STAGE IV (SEVERE): Primary | ICD-10-CM

## 2023-02-07 PROCEDURE — 63600175 PHARM REV CODE 636 W HCPCS: Mod: JG,EC

## 2023-02-07 PROCEDURE — 96372 THER/PROPH/DIAG INJ SC/IM: CPT

## 2023-02-07 RX ADMIN — EPOETIN ALFA-EPBX 20000 UNITS: 10000 INJECTION, SOLUTION INTRAVENOUS; SUBCUTANEOUS at 12:02

## 2023-02-07 NOTE — PLAN OF CARE
Injection given in abdomen x 2, Patient tolerated well, No s/s of distress noted, Stable condition

## 2023-02-08 ENCOUNTER — OFFICE VISIT (OUTPATIENT)
Dept: FAMILY MEDICINE | Facility: CLINIC | Age: 88
End: 2023-02-08
Payer: MEDICARE

## 2023-02-08 VITALS
HEART RATE: 74 BPM | HEIGHT: 62 IN | TEMPERATURE: 99 F | WEIGHT: 112 LBS | SYSTOLIC BLOOD PRESSURE: 106 MMHG | OXYGEN SATURATION: 98 % | DIASTOLIC BLOOD PRESSURE: 40 MMHG | BODY MASS INDEX: 20.61 KG/M2

## 2023-02-08 DIAGNOSIS — R07.89 LEFT-SIDED CHEST WALL PAIN: ICD-10-CM

## 2023-02-08 DIAGNOSIS — I50.32 CHRONIC DIASTOLIC (CONGESTIVE) HEART FAILURE: ICD-10-CM

## 2023-02-08 DIAGNOSIS — I73.9 PERIPHERAL VASCULAR DISEASE, UNSPECIFIED: ICD-10-CM

## 2023-02-08 DIAGNOSIS — F33.41 RECURRENT MAJOR DEPRESSIVE DISORDER, IN PARTIAL REMISSION: ICD-10-CM

## 2023-02-08 DIAGNOSIS — N18.4 ANEMIA OF CHRONIC RENAL FAILURE, STAGE 4 (SEVERE): ICD-10-CM

## 2023-02-08 DIAGNOSIS — N18.4 CHRONIC KIDNEY DISEASE, STAGE IV (SEVERE): ICD-10-CM

## 2023-02-08 DIAGNOSIS — I20.9 ANGINA PECTORIS: ICD-10-CM

## 2023-02-08 DIAGNOSIS — M79.642 LEFT HAND PAIN: ICD-10-CM

## 2023-02-08 DIAGNOSIS — S01.01XD LACERATION OF SCALP, SUBSEQUENT ENCOUNTER: ICD-10-CM

## 2023-02-08 DIAGNOSIS — M81.0 AGE-RELATED OSTEOPOROSIS WITHOUT CURRENT PATHOLOGICAL FRACTURE: ICD-10-CM

## 2023-02-08 DIAGNOSIS — E11.59 TYPE 2 DIABETES MELLITUS WITH OTHER CIRCULATORY COMPLICATIONS: Primary | ICD-10-CM

## 2023-02-08 DIAGNOSIS — D63.1 ANEMIA OF CHRONIC RENAL FAILURE, STAGE 4 (SEVERE): ICD-10-CM

## 2023-02-08 DIAGNOSIS — M46.02 SPINAL ENTHESOPATHY, CERVICAL REGION: ICD-10-CM

## 2023-02-08 PROBLEM — S01.01XA SCALP LACERATION: Status: ACTIVE | Noted: 2023-02-08

## 2023-02-08 PROCEDURE — 99214 OFFICE O/P EST MOD 30 MIN: CPT | Mod: ,,, | Performed by: FAMILY MEDICINE

## 2023-02-08 PROCEDURE — 99214 PR OFFICE/OUTPT VISIT, EST, LEVL IV, 30-39 MIN: ICD-10-PCS | Mod: ,,, | Performed by: FAMILY MEDICINE

## 2023-02-08 RX ORDER — LORATADINE 10 MG/1
10 TABLET ORAL DAILY
COMMUNITY

## 2023-02-08 RX ORDER — CHOLESTYRAMINE 4 G/9G
1 POWDER, FOR SUSPENSION ORAL 2 TIMES DAILY
COMMUNITY
Start: 2023-01-25 | End: 2023-08-15

## 2023-02-08 RX ORDER — FENOFIBRATE 160 MG/1
160 TABLET ORAL
COMMUNITY
Start: 2022-11-28 | End: 2023-02-08

## 2023-02-08 RX ORDER — CALCITRIOL 0.5 UG/1
0.5 CAPSULE ORAL DAILY
COMMUNITY
Start: 2023-02-01 | End: 2023-11-13

## 2023-02-08 RX ORDER — VITAMIN E 268 MG
400 CAPSULE ORAL 2 TIMES DAILY
COMMUNITY

## 2023-02-08 RX ORDER — ATORVASTATIN CALCIUM 40 MG/1
40 TABLET, FILM COATED ORAL NIGHTLY
COMMUNITY
Start: 2022-11-28 | End: 2023-09-05

## 2023-02-08 RX ORDER — METOPROLOL SUCCINATE 25 MG/1
25 TABLET, EXTENDED RELEASE ORAL
COMMUNITY
Start: 2022-08-25 | End: 2023-05-08

## 2023-02-08 RX ORDER — FESOTERODINE FUMARATE 8 MG/1
1 TABLET, FILM COATED, EXTENDED RELEASE ORAL
COMMUNITY
Start: 2022-11-01 | End: 2023-07-27 | Stop reason: CLARIF

## 2023-02-08 RX ORDER — CHOLECALCIFEROL (VITAMIN D3) 25 MCG
1000 TABLET ORAL DAILY
COMMUNITY

## 2023-02-08 RX ORDER — ONDANSETRON HYDROCHLORIDE 8 MG/1
8 TABLET, FILM COATED ORAL
COMMUNITY
Start: 2022-01-03 | End: 2023-08-15

## 2023-02-08 RX ORDER — ISOSORBIDE MONONITRATE 30 MG/1
30 TABLET, EXTENDED RELEASE ORAL
COMMUNITY
Start: 2022-11-17

## 2023-02-08 RX ORDER — TRAMADOL HYDROCHLORIDE 50 MG/1
50 TABLET ORAL
COMMUNITY
Start: 2022-11-08 | End: 2023-02-08

## 2023-02-08 NOTE — PROGRESS NOTES
"SUBJECTIVE:  HPI    Leatha Adames is a 88 y.o. female here for Follow-up.  She recently had a fall at home and sustained an extensive scalp laceration requiring numerous sutures in the emergency room.  CT scan of the head revealed a scalp cephalohematoma.    She complains of some left chest wall pain ever since her fall which occurred 5 days ago.  He also complains of some left hand pain and bruising.    She received erythropoietin injection at the hospital yesterday.  However, she is not receiving intravenous iron.    She needs a referral for diabetic eye exam.    She has some ongoing left posterior knee pain and was recently discovered to have some PVD by Dr. Restrepo.    Her daughter also inquires about receiving Prolia injections.      Curts allergies, medications, history, and problem list were updated as appropriate.    ROS:  Pertinent ROS as above, otherwise negative    OBJECTIVE:  Vital signs  Visit Vitals  BP (!) 106/40 (BP Location: Right arm, Patient Position: Sitting)   Pulse 74   Temp 99.3 °F (37.4 °C) (Oral)   Ht 5' 1.81" (1.57 m)   Wt 50.8 kg (111 lb 15.9 oz)   SpO2 98%   BMI 20.61 kg/m²          PHYSICAL EXAM:  General:  Awake, alert, no acute distress, frail appearing   Head:  Scalp laceration well approximated without signs of infection with numerous staples.  Right periorbital ecchymoses and mild nasal swelling.    Chest: Tenderness to palpation over the left posterolateral chest wall.  Left hand:  Bruising to multiple fingers in the right hand.      ASSESSMENT/PLAN:  1. Type 2 diabetes mellitus with other circulatory complications  -     Ambulatory referral/consult to Ophthalmology; Future; Expected date: 02/15/2023  -     Hemoglobin A1C; Future; Expected date: 02/08/2023    2. Laceration of scalp, subsequent encounter  Assessment & Plan:  Staples out in 1 week on return to clinic      3. Left-sided chest wall pain  Assessment & Plan:  Chest x-ray and rib series of the left ribs    Orders:  -     " X-Ray Chest PA And Lateral; Future; Expected date: 02/08/2023  -     X-Ray Ribs 2 View Left; Future; Expected date: 02/08/2023    4. Left hand pain  -     X-Ray Hand Complete Left; Future; Expected date: 02/08/2023    5. Spinal enthesopathy, cervical region    6. Recurrent major depressive disorder, in partial remission    7. Chronic diastolic (congestive) heart failure    8. Angina pectoris    9. Peripheral vascular disease, unspecified    10. Chronic kidney disease, stage IV (severe)  -     Comprehensive Metabolic Panel; Future; Expected date: 02/08/2023    11. Anemia of chronic renal failure, stage 4 (severe)  Assessment & Plan:  Check iron studies with upcoming lab work.  She may need IV iron    Orders:  -     Ferritin; Future; Expected date: 02/08/2023  -     Iron and TIBC; Future; Expected date: 02/08/2023  -     CBC Auto Differential; Future; Expected date: 02/08/2023    12. Age-related osteoporosis without current pathological fracture  Assessment & Plan:  On Prolia      Other orders  -     denosumab (PROLIA) injection 60 mg            Follow Up:  Follow up for as scheduled.

## 2023-02-09 ENCOUNTER — HOSPITAL ENCOUNTER (OUTPATIENT)
Dept: RADIOLOGY | Facility: HOSPITAL | Age: 88
Discharge: HOME | End: 2023-02-09
Attending: FAMILY MEDICINE
Payer: MEDICARE

## 2023-02-09 ENCOUNTER — TELEPHONE (OUTPATIENT)
Dept: FAMILY MEDICINE | Facility: CLINIC | Age: 88
End: 2023-02-09
Payer: MEDICARE

## 2023-02-09 DIAGNOSIS — R07.89 LEFT-SIDED CHEST WALL PAIN: ICD-10-CM

## 2023-02-09 DIAGNOSIS — M79.642 LEFT HAND PAIN: ICD-10-CM

## 2023-02-09 PROCEDURE — 73130 X-RAY EXAM OF HAND: CPT | Mod: TC,LT

## 2023-02-09 PROCEDURE — 71046 X-RAY EXAM CHEST 2 VIEWS: CPT | Mod: TC

## 2023-02-09 PROCEDURE — 71100 X-RAY EXAM RIBS UNI 2 VIEWS: CPT | Mod: TC,LT

## 2023-02-09 NOTE — TELEPHONE ENCOUNTER
----- Message from Dennys Linares MD sent at 2/9/2023  4:04 PM CST -----  X-ray showed no rib fractures or fractures in the hand.  There is also no pneumonia.

## 2023-02-10 PROCEDURE — 82728 ASSAY OF FERRITIN: CPT | Performed by: FAMILY MEDICINE

## 2023-02-10 PROCEDURE — 80053 COMPREHEN METABOLIC PANEL: CPT | Performed by: FAMILY MEDICINE

## 2023-02-10 PROCEDURE — 83550 IRON BINDING TEST: CPT | Performed by: FAMILY MEDICINE

## 2023-02-10 PROCEDURE — 83036 HEMOGLOBIN GLYCOSYLATED A1C: CPT | Performed by: FAMILY MEDICINE

## 2023-02-10 PROCEDURE — 85025 COMPLETE CBC W/AUTO DIFF WBC: CPT | Performed by: FAMILY MEDICINE

## 2023-02-14 ENCOUNTER — OFFICE VISIT (OUTPATIENT)
Dept: GASTROENTEROLOGY | Facility: CLINIC | Age: 88
End: 2023-02-14
Payer: MEDICARE

## 2023-02-14 VITALS
HEIGHT: 61 IN | SYSTOLIC BLOOD PRESSURE: 146 MMHG | DIASTOLIC BLOOD PRESSURE: 73 MMHG | OXYGEN SATURATION: 97 % | BODY MASS INDEX: 21.41 KG/M2 | HEART RATE: 68 BPM | WEIGHT: 113.38 LBS

## 2023-02-14 DIAGNOSIS — K52.9 CHRONIC DIARRHEA: ICD-10-CM

## 2023-02-14 DIAGNOSIS — R13.10 DYSPHAGIA, UNSPECIFIED TYPE: ICD-10-CM

## 2023-02-14 DIAGNOSIS — Z86.010 HISTORY OF COLON POLYPS: ICD-10-CM

## 2023-02-14 DIAGNOSIS — K31.A0 GASTRIC INTESTINAL METAPLASIA: ICD-10-CM

## 2023-02-14 DIAGNOSIS — K21.9 GASTROESOPHAGEAL REFLUX DISEASE, UNSPECIFIED WHETHER ESOPHAGITIS PRESENT: Primary | ICD-10-CM

## 2023-02-14 PROCEDURE — 99214 OFFICE O/P EST MOD 30 MIN: CPT | Mod: S$GLB,,, | Performed by: INTERNAL MEDICINE

## 2023-02-14 PROCEDURE — 99214 PR OFFICE/OUTPT VISIT, EST, LEVL IV, 30-39 MIN: ICD-10-PCS | Mod: S$GLB,,, | Performed by: INTERNAL MEDICINE

## 2023-02-14 RX ORDER — PANTOPRAZOLE SODIUM 40 MG/1
40 TABLET, DELAYED RELEASE ORAL DAILY
Qty: 90 TABLET | Refills: 4 | Status: SHIPPED | OUTPATIENT
Start: 2023-02-14

## 2023-02-14 NOTE — PATIENT INSTRUCTIONS
Continue cholestyramine daily.  Continue pantoprazole 40 mg daily.   Notify me if you weight decreases or if her swallowing symptoms progress.

## 2023-02-14 NOTE — PROGRESS NOTES
Clinic Note    Reason for visit:  The primary encounter diagnosis was Gastroesophageal reflux disease, unspecified whether esophagitis present. Diagnoses of Dysphagia, unspecified type, Chronic diarrhea, History of colon polyps, and Gastric intestinal metaplasia were also pertinent to this visit.    PCP: Dennys Linares       HPI:  This is a 88 y.o. female who is here for a follow up. Patient is taking cholestyramine 1 packet daily and states diarrhea is controlled. Denies blood in stool or dark stools. Patient reports intermittent reflux. Also has intermittent dysphagia with solids, pills, and some liquids. May have to vomit up food. Taking panto 40 daily. She reports occasionally having nausea but not long enough to take nausea medicine. Eating well. Takes medical marijuana. Weight stable from last OV 7/2022 at 113#. She fell during the night a week ago and hit head and had laceration requiring 18 staples.   Her pain is in her back at a 10 she rates it as a 7.  May be worse more recently.  Potentially related to her fall.  Was not getting Epogen injections but is being set up.  Sounds like she is getting IV iron as well.    Daughter, Arleth, present during OV.    Labs 2/10/2023: Hgb 8.8L, MCV 93.4  Labs 2/7/2023: Hgb 8.4L  Labs 4/2021: Hgb 9.5L    8/2022 CT AP w/o: wnl.    9/15/2021 - Speech Pathology Evaluation - Overall, oral pharyngeal phases wnl, mild-mod pharyngeal residue, mild-mod cervical narrowing. Now on ISMN    EM 9/2021: no chicago classification found    EGD/Colonoscopy 5/14/2021: benign gastroesophageal stricture 13 mm dilated to 15 mm. 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    Review of Systems   Constitutional:  Positive for fatigue. Negative for chills, diaphoresis, fever and unexpected weight change.   HENT:  Positive for postnasal drip and trouble swallowing. Negative for mouth sores, nosebleeds, sore throat and voice  change.    Eyes:  Positive for eye dryness. Negative for pain and discharge.   Respiratory:  Positive for choking, chest tightness and shortness of breath. Negative for apnea, cough and wheezing.    Cardiovascular:  Positive for claudication. Negative for chest pain, palpitations and leg swelling.   Gastrointestinal:  Positive for abdominal pain. Negative for abdominal distention, anal bleeding, blood in stool, change in bowel habit, constipation, diarrhea, nausea, rectal pain, vomiting, reflux, fecal incontinence and change in bowel habit.   Genitourinary:  Negative for bladder incontinence, difficulty urinating, dysuria, flank pain, frequency and hematuria.   Musculoskeletal:  Positive for back pain. Negative for arthralgias, joint swelling and joint deformity.   Integumentary:  Negative for color change, rash and wound.   Allergic/Immunologic: Negative for environmental allergies and food allergies.   Neurological:  Positive for weakness and memory loss. Negative for seizures, facial asymmetry, speech difficulty and headaches.   Hematological:  Negative for adenopathy. Bruises/bleeds easily.   Psychiatric/Behavioral:  Positive for agitation. Negative for behavioral problems, confusion, hallucinations and sleep disturbance.       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     Urge incontinence       Past Surgical History:   Procedure Laterality Date    CARDIAC CATHETERIZATION  07/24/2008    CHOLECYSTECTOMY      CYSTOCELE REPAIR  2001    EPIDURAL STEROID INJECTION  06/2020    ESOPHAGOGASTRODUODENOSCOPY (EGD) WITH DILATION  05/14/2021    HEMORRHOID SURGERY      HYSTERECTOMY      MASTECTOMY  1995    PERCUTANEOUS TRANSLUMINAL BALLOON ANGIOPLASTY OF CORONARY ARTERY  08/01/2017    PERIPHERAL ARTERIAL STENT GRAFT  09/12/2018     Family History   Problem Relation Age of Onset    Breast cancer Mother     Heart disease Father     Epilepsy Father      Social History     Tobacco Use    Smoking status: Former    Smokeless tobacco: Never   Substance Use Topics    Alcohol use: Not Currently     Review of patient's allergies indicates:   Allergen Reactions    Ace inhibitors Other (See Comments)    Amoxicillin-pot clavulanate Other (See Comments)    Celecoxib Other (See Comments)    Codeine     Hydrocodone-acetaminophen     Irbesartan Other (See Comments)    Penicillins         Medication List with Changes/Refills   Current Medications    ACETAMINOPHEN (TYLENOL) 500 MG TABLET    Take 1,000 mg by mouth every 8 (eight) hours as needed for Pain.    ASPIRIN 81 MG CHEW    aspirin 81 mg chewable tablet   Chew 1 tablet every day by oral route.    ATORVASTATIN (LIPITOR) 40 MG TABLET    Take 40 mg by mouth.    BENZONATATE (TESSALON) 100 MG CAPSULE    TK 1 C PO Q 4 HOURS AS NEEDED FOR COUGH    CALCITRIOL (ROCALTROL) 0.5 MCG CAP    Take 0.5 mcg by mouth.    CHOLESTYRAMINE (QUESTRAN) 4 GRAM PACKET    Take 1 packet by mouth 2 (two) times daily.    CLONAZEPAM (KLONOPIN) 1 MG TABLET    clonazepam 1 mg tablet   Take 1 tablet every day by oral route at bedtime.    CLOPIDOGREL (PLAVIX) 75 MG TABLET    Plavix 75 mg tablet   Take 1 tablet every day by oral route.    FIBER CHOICE ORAL    Take by mouth 4 (four) times daily as needed.    FUROSEMIDE (LASIX) 20 MG TABLET    furosemide 20 mg tablet   Take 1 tablet every day by oral route.     "ISOSORBIDE MONONITRATE (IMDUR) 30 MG 24 HR TABLET    Take 30 mg by mouth.    LORATADINE (CLARITIN) 10 MG TABLET    Take 10 mg by mouth once daily.    METOPROLOL SUCCINATE (TOPROL-XL) 25 MG 24 HR TABLET    Take 25 mg by mouth.    NITROGLYCERIN (NITROSTAT) 0.4 MG SL TABLET    Place 0.4 mg under the tongue.    ONDANSETRON (ZOFRAN) 8 MG TABLET    Take 8 mg by mouth.    ROPINIROLE (REQUIP) 1 MG TABLET    Take 2 tablets (2 mg total) by mouth every evening.    TOVIAZ 8 MG TB24    Take 1 tablet by mouth.    TRAMADOL (ULTRAM) 50 MG TABLET    TAKE 2 TABLETS BY MOUTH EVERY TWELVE HOURS AS NEEDED FOR PAIN    VENLAFAXINE (EFFEXOR-XR) 75 MG 24 HR CAPSULE    Take 75 mg by mouth once daily.    VITAMIN D (VITAMIN D3) 1000 UNITS TAB    Take 1,000 Units by mouth once daily.    VITAMIN E 400 UNIT CAPSULE    Take 400 Units by mouth 2 (two) times daily.   Changed and/or Refilled Medications    Modified Medication Previous Medication    PANTOPRAZOLE (PROTONIX) 40 MG TABLET pantoprazole (PROTONIX) 40 MG tablet       Take 1 tablet (40 mg total) by mouth once daily.    Take 1 tablet (40 mg total) by mouth once daily.         Vital Signs:  BP (!) 146/73   Pulse 68   Ht 5' 1" (1.549 m)   Wt 51.4 kg (113 lb 6.4 oz)   SpO2 97%   BMI 21.43 kg/m²         Physical Exam  Vitals reviewed.   Constitutional:       General: She is awake. She is not in acute distress.     Appearance: Normal appearance. She is well-developed. She is not ill-appearing, toxic-appearing or diaphoretic.      Comments: Four-wheel walker   HENT:      Head: Normocephalic and atraumatic.      Nose: Nose normal.      Mouth/Throat:      Mouth: Mucous membranes are moist.      Pharynx: Oropharynx is clear. No oropharyngeal exudate or posterior oropharyngeal erythema.   Eyes:      General: Lids are normal. Gaze aligned appropriately. No scleral icterus.        Right eye: No discharge.         Left eye: No discharge.      Extraocular Movements: Extraocular movements intact.      " Conjunctiva/sclera: Conjunctivae normal.   Neck:      Trachea: Trachea normal.   Cardiovascular:      Rate and Rhythm: Normal rate and regular rhythm.      Pulses:           Radial pulses are 2+ on the right side and 2+ on the left side.   Pulmonary:      Effort: Pulmonary effort is normal. No respiratory distress.      Breath sounds: Normal breath sounds. No stridor. No wheezing or rhonchi.   Chest:      Chest wall: No tenderness.   Abdominal:      General: Bowel sounds are normal. There is no distension.      Palpations: Abdomen is soft. There is no fluid wave, hepatomegaly or mass.      Tenderness: There is no abdominal tenderness. There is no guarding or rebound.   Musculoskeletal:         General: No tenderness or deformity.      Cervical back: Full passive range of motion without pain and neck supple. No tenderness.      Right lower leg: No edema.      Left lower leg: No edema.   Lymphadenopathy:      Cervical: No cervical adenopathy.   Skin:     General: Skin is warm and dry.      Capillary Refill: Capillary refill takes less than 2 seconds.      Coloration: Skin is not cyanotic, jaundiced or pale.      Findings: No rash.      Comments: Staples in scalp for laceration   Neurological:      General: No focal deficit present.      Mental Status: She is alert and oriented to person, place, and time.      Cranial Nerves: No facial asymmetry.      Motor: No tremor.   Psychiatric:         Attention and Perception: Attention normal.         Mood and Affect: Mood and affect normal.         Speech: Speech normal.         Behavior: Behavior normal. Behavior is cooperative.          All of the data above and below has been reviewed by myself and any further interpretations will be reflected in the assessment and plan.   The data includes review of external notes, and independent interpretation of lab results, procedures, x-rays, and imaging reports.      Assessment:  Gastroesophageal reflux disease, unspecified whether  esophagitis present  -     pantoprazole (PROTONIX) 40 MG tablet; Take 1 tablet (40 mg total) by mouth once daily.  Dispense: 90 tablet; Refill: 4    Dysphagia, unspecified type    Chronic diarrhea    History of colon polyps    Gastric intestinal metaplasia      Diarrhea controlled with cholestyramine 1 packet daily.  GERD controlled with panto 40 daily.   Would hope to avoid endoscopic procedures with interventions as his dilation if able given her blood thinner use and comorbidities.  As long as she is maintaining her weight and her nutritional status.  If she does have a repeat upper endoscopy then would plan for gastric mapping as well.     Recommendations:  Continue cholestyramine daily.  Continue pantoprazole 40 mg daily.  Notify me if you weight decreases or if her swallowing symptoms progress.     Risks, benefits, and alternatives of medical management, any associated procedures, and/or treatment discussed with the patient. Patient given opportunity to ask questions and voices understanding. Patient has elected to proceed with the recommended care modalities as discussed.    Follow up in about 6 months (around 8/14/2023).    Order summary:  No orders of the defined types were placed in this encounter.       Instructed patient to notify my office if they have not been contacted within two weeks after any procedures, submitting any samples (biopsies, blood, stool, urine, etc.) or after any imaging (X-ray, CT, MRI, etc.).     Yvette Morgan MD    This document may have been created using a voice recognition transcribing system. Incorrect words or phrases may have been missed during proofreading. Please interpret accordingly or contact me for clarification.

## 2023-02-17 ENCOUNTER — OFFICE VISIT (OUTPATIENT)
Dept: FAMILY MEDICINE | Facility: CLINIC | Age: 88
End: 2023-02-17
Payer: MEDICARE

## 2023-02-17 VITALS
SYSTOLIC BLOOD PRESSURE: 122 MMHG | WEIGHT: 116.38 LBS | HEIGHT: 62 IN | TEMPERATURE: 99 F | HEART RATE: 77 BPM | BODY MASS INDEX: 21.42 KG/M2 | OXYGEN SATURATION: 96 % | DIASTOLIC BLOOD PRESSURE: 56 MMHG

## 2023-02-17 DIAGNOSIS — M81.0 AGE-RELATED OSTEOPOROSIS WITHOUT CURRENT PATHOLOGICAL FRACTURE: ICD-10-CM

## 2023-02-17 DIAGNOSIS — E11.59 TYPE 2 DIABETES MELLITUS WITH OTHER CIRCULATORY COMPLICATIONS: ICD-10-CM

## 2023-02-17 DIAGNOSIS — D63.1 ANEMIA OF CHRONIC RENAL FAILURE, STAGE 4 (SEVERE): ICD-10-CM

## 2023-02-17 DIAGNOSIS — N18.4 CHRONIC KIDNEY DISEASE, STAGE IV (SEVERE): ICD-10-CM

## 2023-02-17 DIAGNOSIS — S01.01XD LACERATION OF SCALP, SUBSEQUENT ENCOUNTER: Primary | ICD-10-CM

## 2023-02-17 DIAGNOSIS — N18.4 ANEMIA OF CHRONIC RENAL FAILURE, STAGE 4 (SEVERE): ICD-10-CM

## 2023-02-17 DIAGNOSIS — R29.6 RECURRENT FALLS: ICD-10-CM

## 2023-02-17 PROCEDURE — 99214 OFFICE O/P EST MOD 30 MIN: CPT | Mod: ,,, | Performed by: FAMILY MEDICINE

## 2023-02-17 PROCEDURE — 99214 PR OFFICE/OUTPT VISIT, EST, LEVL IV, 30-39 MIN: ICD-10-PCS | Mod: ,,, | Performed by: FAMILY MEDICINE

## 2023-02-17 NOTE — ASSESSMENT & PLAN NOTE
Renal function stable and slightly improved    Continue to monitor    Ongoing follow-up with Nephrology.  Next appointment in March

## 2023-02-17 NOTE — PROGRESS NOTES
"SUBJECTIVE:  HPI    Leatha Adames is a 88 y.o. female here for 3 month lab f/u and Suture / Staple Removal.     Labs from February 10, 2023 reviewed.  Hemoglobin 8.8, iron studies normal, creatinine 1.67, hemoglobin A1c 5.2     She is doing well.  However, she is concerned about weakness and difficulty ambulating.  She is having to use some assistance devices to ambulate.  She has ongoing claudication in her right leg.  She has an upcoming appointment with Cardiology.      Her follow-up with Nephrology is in March.    She is receiving erythropoietin for her anemia associated with renal insufficiency.  Additionally, she should be scheduled for Prolia soon for osteoporosis     She denies any chest pain or shortness of breath.  She is still grieving the recent loss of her .    Curts allergies, medications, history, and problem list were updated as appropriate.    ROS:  Pertinent ROS as above, otherwise negative    OBJECTIVE:  Vital signs  Visit Vitals  BP (!) 122/56 (BP Location: Right arm)   Pulse 77   Temp 98.8 °F (37.1 °C) (Temporal)   Ht 5' 2.21" (1.58 m)   Wt 52.8 kg (116 lb 6.5 oz)   SpO2 96%   BMI 21.15 kg/m²          PHYSICAL EXAM:  General:  Awake, alert, no acute distress   Cardiovascular: Regular rate and rhythm.  3/6 systolic murmur.  Respiratory: Clear to auscultation bilaterally, normal effort  Extremities:  No peripheral edema, no cyanosis  Skin:  18 staples removed from her left scalp laceration.  Wound healing well.        ASSESSMENT/PLAN:  1. Laceration of scalp, subsequent encounter  Assessment & Plan:  Sutures removed.  Wound healing well.      2. Chronic kidney disease, stage IV (severe)  Overview:  Lab Results   Component Value Date    CREATININE 1.67 (H) 02/10/2023    EGFRNONAA 30 03/10/2022    EGFRNONAA 38 04/23/2021       Assessment & Plan:  Renal function stable and slightly improved    Continue to monitor    Ongoing follow-up with Nephrology.  Next appointment in March    Orders:  -  "    Comprehensive Metabolic Panel; Future; Expected date: 05/17/2023    3. Anemia of chronic renal failure, stage 4 (severe)  -     CBC Auto Differential; Future; Expected date: 05/17/2023  -     Ferritin; Future; Expected date: 05/17/2023  -     Iron and TIBC; Future; Expected date: 05/17/2023    4. Type 2 diabetes mellitus with other circulatory complications  Overview:  Lab Results   Component Value Date    HGBA1C 5.2 02/10/2023         Assessment & Plan:  Currently controlled without medication.    Orders:  -     Hemoglobin A1C; Future; Expected date: 05/17/2023    5. Age-related osteoporosis without current pathological fracture  -     Ambulatory referral/consult to Physical/Occupational Therapy; Future; Expected date: 02/24/2023    6. Recurrent falls  -     Ambulatory referral/consult to Physical/Occupational Therapy; Future; Expected date: 02/24/2023            Follow Up:  Follow up in about 3 months (around 5/17/2023) for Fasting labs, Follow-up.

## 2023-03-09 ENCOUNTER — LAB VISIT (OUTPATIENT)
Dept: LAB | Facility: HOSPITAL | Age: 88
End: 2023-03-09
Attending: FAMILY MEDICINE
Payer: MEDICARE

## 2023-03-09 ENCOUNTER — INFUSION (OUTPATIENT)
Dept: INFUSION THERAPY | Facility: HOSPITAL | Age: 88
End: 2023-03-09
Attending: FAMILY MEDICINE
Payer: MEDICARE

## 2023-03-09 DIAGNOSIS — N18.4 ANEMIA OF CHRONIC RENAL FAILURE, STAGE 4 (SEVERE): ICD-10-CM

## 2023-03-09 DIAGNOSIS — M81.0 AGE-RELATED OSTEOPOROSIS WITHOUT CURRENT PATHOLOGICAL FRACTURE: ICD-10-CM

## 2023-03-09 DIAGNOSIS — D63.1 ANEMIA OF CHRONIC RENAL FAILURE, STAGE 4 (SEVERE): ICD-10-CM

## 2023-03-09 DIAGNOSIS — N18.4 CHRONIC KIDNEY DISEASE, STAGE IV (SEVERE): ICD-10-CM

## 2023-03-09 DIAGNOSIS — E11.59 TYPE 2 DIABETES MELLITUS WITH OTHER CIRCULATORY COMPLICATIONS: ICD-10-CM

## 2023-03-09 DIAGNOSIS — M81.0 AGE-RELATED OSTEOPOROSIS WITHOUT CURRENT PATHOLOGICAL FRACTURE: Primary | ICD-10-CM

## 2023-03-09 LAB
ALBUMIN SERPL-MCNC: 3.9 G/DL (ref 3.4–5)
ALBUMIN/GLOB SERPL: 1.6 RATIO
ALP SERPL-CCNC: 38 UNIT/L (ref 50–144)
ALT SERPL-CCNC: 14 UNIT/L (ref 1–45)
ANION GAP SERPL CALC-SCNC: 8 MEQ/L (ref 2–13)
AST SERPL-CCNC: 26 UNIT/L (ref 14–36)
BASOPHILS # BLD AUTO: 0.04 X10(3)/MCL (ref 0.01–0.08)
BASOPHILS NFR BLD AUTO: 0.7 % (ref 0.1–1.2)
BILIRUBIN DIRECT+TOT PNL SERPL-MCNC: 0.2 MG/DL (ref 0–1)
BUN SERPL-MCNC: 33 MG/DL (ref 7–20)
CALCIUM SERPL-MCNC: 9.7 MG/DL (ref 8.4–10.2)
CHLORIDE SERPL-SCNC: 107 MMOL/L (ref 98–110)
CO2 SERPL-SCNC: 27 MMOL/L (ref 21–32)
CREAT SERPL-MCNC: 1.7 MG/DL (ref 0.66–1.25)
CREAT/UREA NIT SERPL: 19 (ref 12–20)
EOSINOPHIL # BLD AUTO: 0.32 X10(3)/MCL (ref 0.04–0.36)
EOSINOPHIL NFR BLD AUTO: 5.6 % (ref 0.7–7)
ERYTHROCYTE [DISTWIDTH] IN BLOOD BY AUTOMATED COUNT: 13.8 % (ref 11–14.5)
EST. AVERAGE GLUCOSE BLD GHB EST-MCNC: 93.9 MG/DL (ref 70–115)
FERRITIN SERPL-MCNC: 145 NG/ML (ref 6.24–264)
GFR SERPLBLD CREATININE-BSD FMLA CKD-EPI: 29 MLS/MIN/1.73/M2
GLOBULIN SER-MCNC: 2.4 GM/DL (ref 2–3.9)
GLUCOSE SERPL-MCNC: 134 MG/DL (ref 70–115)
HBA1C MFR BLD: 4.9 % (ref 4–6)
HCT VFR BLD AUTO: 27.6 % (ref 36–48)
HGB BLD-MCNC: 8.8 G/DL (ref 11.8–16)
IMM GRANULOCYTES # BLD AUTO: 0.02 X10(3)/MCL (ref 0–0.03)
IMM GRANULOCYTES NFR BLD AUTO: 0.4 % (ref 0–0.5)
IRON SATN MFR SERPL: 24 % (ref 20–50)
IRON SERPL-MCNC: 75 UG/DL (ref 50–170)
LYMPHOCYTES # BLD AUTO: 1.78 X10(3)/MCL (ref 1.16–3.74)
LYMPHOCYTES NFR BLD AUTO: 31.3 % (ref 20–55)
MCH RBC QN AUTO: 31 PG (ref 27–34)
MCV RBC AUTO: 97.2 FL (ref 79–99)
MEAN CELL HEMOGLOBIN CONCENTRATION (OHS) G/DL: 31.9 G/DL (ref 31–37)
MONOCYTES # BLD AUTO: 0.47 X10(3)/MCL (ref 0.24–0.36)
MONOCYTES NFR BLD AUTO: 8.3 % (ref 4.7–12.5)
NEUTROPHILS # BLD AUTO: 3.06 X10(3)/MCL (ref 1.56–6.13)
NEUTROPHILS NFR BLD AUTO: 53.7 % (ref 37–73)
NRBC BLD AUTO-RTO: 0 % (ref 0–1)
PLATELET # BLD AUTO: 219 X10(3)/MCL (ref 140–371)
PMV BLD AUTO: 10 FL (ref 9.4–12.4)
POTASSIUM SERPL-SCNC: 4.3 MMOL/L (ref 3.5–5.1)
PROT SERPL-MCNC: 6.3 GM/DL (ref 6.3–8.2)
RBC # BLD AUTO: 2.84 X10(6)/MCL (ref 4–5.1)
SODIUM SERPL-SCNC: 142 MMOL/L (ref 135–145)
TIBC SERPL-MCNC: 243 UG/DL (ref 70–310)
TIBC SERPL-MCNC: 318 UG/DL (ref 250–450)
TRANSFERRIN SERPL-MCNC: 293 MG/DL
WBC # SPEC AUTO: 5.7 X10(3)/MCL (ref 4–11.5)

## 2023-03-09 PROCEDURE — 85025 COMPLETE CBC W/AUTO DIFF WBC: CPT

## 2023-03-09 PROCEDURE — 63600175 PHARM REV CODE 636 W HCPCS: Mod: JZ,JG

## 2023-03-09 PROCEDURE — 83036 HEMOGLOBIN GLYCOSYLATED A1C: CPT

## 2023-03-09 PROCEDURE — 83550 IRON BINDING TEST: CPT

## 2023-03-09 PROCEDURE — 82728 ASSAY OF FERRITIN: CPT

## 2023-03-09 PROCEDURE — 63600175 PHARM REV CODE 636 W HCPCS: Mod: JZ,JG | Performed by: FAMILY MEDICINE

## 2023-03-09 PROCEDURE — 80053 COMPREHEN METABOLIC PANEL: CPT

## 2023-03-09 PROCEDURE — 36415 COLL VENOUS BLD VENIPUNCTURE: CPT

## 2023-03-09 PROCEDURE — 96372 THER/PROPH/DIAG INJ SC/IM: CPT

## 2023-03-09 RX ADMIN — ERYTHROPOIETIN 20000 UNITS: 10000 INJECTION, SOLUTION INTRAVENOUS; SUBCUTANEOUS at 02:03

## 2023-03-09 RX ADMIN — DENOSUMAB 60 MG: 60 INJECTION SUBCUTANEOUS at 02:03

## 2023-03-09 NOTE — NURSING
Pt given injections, Prolia given in 1 shot to rt arm. Retacrit given in 2 separate sub Q injections to abdomen. Pt tolerated well, no s/s of reaction. Instructed to follow up with physician. Ambulated out of dept with daughter at side, in stable condition.

## 2023-03-22 PROCEDURE — 82570 ASSAY OF URINE CREATININE: CPT | Performed by: INTERNAL MEDICINE

## 2023-03-22 PROCEDURE — 85025 COMPLETE CBC W/AUTO DIFF WBC: CPT | Performed by: INTERNAL MEDICINE

## 2023-03-22 PROCEDURE — 81001 URINALYSIS AUTO W/SCOPE: CPT | Performed by: INTERNAL MEDICINE

## 2023-03-22 PROCEDURE — 80048 BASIC METABOLIC PNL TOTAL CA: CPT | Performed by: INTERNAL MEDICINE

## 2023-04-03 DIAGNOSIS — D63.1 ANEMIA OF CHRONIC RENAL FAILURE, STAGE 4 (SEVERE): Primary | ICD-10-CM

## 2023-04-03 DIAGNOSIS — N18.4 ANEMIA OF CHRONIC RENAL FAILURE, STAGE 4 (SEVERE): Primary | ICD-10-CM

## 2023-04-10 ENCOUNTER — INFUSION (OUTPATIENT)
Dept: INFUSION THERAPY | Facility: HOSPITAL | Age: 88
End: 2023-04-10
Attending: FAMILY MEDICINE
Payer: MEDICARE

## 2023-04-10 VITALS
OXYGEN SATURATION: 100 % | DIASTOLIC BLOOD PRESSURE: 68 MMHG | TEMPERATURE: 98 F | RESPIRATION RATE: 18 BRPM | HEART RATE: 68 BPM | SYSTOLIC BLOOD PRESSURE: 183 MMHG

## 2023-04-10 DIAGNOSIS — N18.4 ANEMIA OF CHRONIC RENAL FAILURE, STAGE 4 (SEVERE): ICD-10-CM

## 2023-04-10 DIAGNOSIS — M81.0 AGE-RELATED OSTEOPOROSIS WITHOUT CURRENT PATHOLOGICAL FRACTURE: Primary | ICD-10-CM

## 2023-04-10 DIAGNOSIS — D63.1 ANEMIA DUE TO STAGE 4 CHRONIC KIDNEY DISEASE TREATED WITH ERYTHROPOIETIN: ICD-10-CM

## 2023-04-10 DIAGNOSIS — N18.4 ANEMIA DUE TO STAGE 4 CHRONIC KIDNEY DISEASE TREATED WITH ERYTHROPOIETIN: ICD-10-CM

## 2023-04-10 DIAGNOSIS — N18.4 CHRONIC KIDNEY DISEASE, STAGE IV (SEVERE): ICD-10-CM

## 2023-04-10 DIAGNOSIS — D63.1 ANEMIA OF CHRONIC RENAL FAILURE, STAGE 4 (SEVERE): ICD-10-CM

## 2023-04-10 PROCEDURE — 63600175 PHARM REV CODE 636 W HCPCS: Mod: JZ,JG,EC

## 2023-04-10 PROCEDURE — 96372 THER/PROPH/DIAG INJ SC/IM: CPT

## 2023-04-10 RX ADMIN — EPOETIN ALFA-EPBX 20000 UNITS: 10000 INJECTION, SOLUTION INTRAVENOUS; SUBCUTANEOUS at 10:04

## 2023-04-10 NOTE — PLAN OF CARE
Injection given x 2 to patient's abdomen, Patient tolerated well, Patient's blood pressure is elevated, Instructed patient to monitor her BP at home and notify Dr. Linares if her BP continues to stay elevated, Patient and her daughter verbalized understanding, Discharged home in stable condition

## 2023-05-08 ENCOUNTER — OFFICE VISIT (OUTPATIENT)
Dept: FAMILY MEDICINE | Facility: CLINIC | Age: 88
End: 2023-05-08
Payer: MEDICARE

## 2023-05-08 VITALS
HEART RATE: 97 BPM | BODY MASS INDEX: 21.6 KG/M2 | WEIGHT: 117.38 LBS | HEIGHT: 62 IN | OXYGEN SATURATION: 98 % | DIASTOLIC BLOOD PRESSURE: 64 MMHG | SYSTOLIC BLOOD PRESSURE: 138 MMHG | TEMPERATURE: 99 F

## 2023-05-08 DIAGNOSIS — J31.0 CHRONIC RHINITIS: Primary | ICD-10-CM

## 2023-05-08 DIAGNOSIS — M65.352 TRIGGER LITTLE FINGER OF LEFT HAND: ICD-10-CM

## 2023-05-08 DIAGNOSIS — F33.41 RECURRENT MAJOR DEPRESSIVE DISORDER, IN PARTIAL REMISSION: ICD-10-CM

## 2023-05-08 PROCEDURE — 99214 PR OFFICE/OUTPT VISIT, EST, LEVL IV, 30-39 MIN: ICD-10-PCS | Mod: ,,, | Performed by: FAMILY MEDICINE

## 2023-05-08 PROCEDURE — 99214 OFFICE O/P EST MOD 30 MIN: CPT | Mod: ,,, | Performed by: FAMILY MEDICINE

## 2023-05-08 RX ORDER — VENLAFAXINE HYDROCHLORIDE 150 MG/1
150 CAPSULE, EXTENDED RELEASE ORAL DAILY
Qty: 30 CAPSULE | Refills: 5 | Status: SHIPPED | OUTPATIENT
Start: 2023-05-08 | End: 2023-06-13 | Stop reason: SDUPTHER

## 2023-05-08 RX ORDER — AZELASTINE 1 MG/ML
2 SPRAY, METERED NASAL 2 TIMES DAILY
Qty: 30 ML | Refills: 5 | Status: SHIPPED | OUTPATIENT
Start: 2023-05-08 | End: 2024-03-20

## 2023-05-08 NOTE — PROGRESS NOTES
"SUBJECTIVE:  HPI    Leatha Adames is a 88 y.o. female here for Nasal Congestion and Depression (Trigger finger).     Patient a greater than 2 week history of persistent clear rhinorrhea without nasal congestion, fever, headache.    She also reports increased depressed mood and recurrent crying spells.  She is been dealing with some social stressors recently that have worsened her depression.    Also, she complains of triggering of multiple fingers on both hands.  Specifically, she has intermittent triggering of the pinky fingers on both hands as well as the index finger on 1 hand.    Curts allergies, medications, history, and problem list were updated as appropriate.    ROS:  Pertinent ROS as above, otherwise negative    OBJECTIVE:  Vital signs  Visit Vitals  /64 (BP Location: Left arm)   Pulse 97   Temp 99.1 °F (37.3 °C) (Temporal)   Ht 5' 2" (1.575 m)   Wt 53.3 kg (117 lb 6.4 oz)   SpO2 98%   BMI 21.47 kg/m²          PHYSICAL EXAM:  General:  Awake, alert, no acute distress   Eyes:  Pupils equal, round, reactive to light.  Conjunctiva normal bilaterally.  Ears:  Bilateral external auditory canals normal.  Bilateral tympanic membranes normal.  Nares:  Pale turbinates with clear rhinorrhea.  Cardiovascular:  3/6 systolic murmur heard throughout the precordium, regular rhythm and normal rate.  Respiratory: Clear to auscultation bilaterally, normal effort  Musculoskeletal:  Flexor tendon nodules present over the 5th fingers of both hands but no triggering on exam today      ASSESSMENT/PLAN:  1. Chronic rhinitis  Assessment & Plan:  Azelastine nasal spray, 2 sprays each nostril b.i.d.    Orders:  -     azelastine (ASTELIN) 137 mcg (0.1 %) nasal spray; 2 sprays (274 mcg total) by Nasal route 2 (two) times daily.  Dispense: 30 mL; Refill: 5    2. Recurrent major depressive disorder, in partial remission  Assessment & Plan:  Increase venlafaxine extended release to 150 mg daily    Orders:  -     azelastine " (ASTELIN) 137 mcg (0.1 %) nasal spray; 2 sprays (274 mcg total) by Nasal route 2 (two) times daily.  Dispense: 30 mL; Refill: 5    3. Trigger little finger of left hand  Assessment & Plan:  Multiple fingers on both hands including both little fingers and the index finger of 1 hand     We will inject when becomes more consistent      Other orders  -     venlafaxine (EFFEXOR-XR) 150 MG Cp24; Take 1 capsule (150 mg total) by mouth once daily.  Dispense: 30 capsule; Refill: 5      Return to clinic as scheduled in 1 month with lab work and can reassess the above at that time

## 2023-05-08 NOTE — ASSESSMENT & PLAN NOTE
Multiple fingers on both hands including both little fingers and the index finger of 1 hand     We will inject when becomes more consistent

## 2023-05-11 ENCOUNTER — INFUSION (OUTPATIENT)
Dept: INFUSION THERAPY | Facility: HOSPITAL | Age: 88
End: 2023-05-11
Attending: FAMILY MEDICINE
Payer: MEDICARE

## 2023-05-11 VITALS
HEART RATE: 79 BPM | TEMPERATURE: 98 F | DIASTOLIC BLOOD PRESSURE: 66 MMHG | RESPIRATION RATE: 18 BRPM | OXYGEN SATURATION: 95 % | SYSTOLIC BLOOD PRESSURE: 139 MMHG

## 2023-05-11 DIAGNOSIS — N18.9 ANEMIA IN CHRONIC KIDNEY DISEASE, UNSPECIFIED CKD STAGE: Primary | ICD-10-CM

## 2023-05-11 DIAGNOSIS — D63.1 ANEMIA IN CHRONIC KIDNEY DISEASE, UNSPECIFIED CKD STAGE: Primary | ICD-10-CM

## 2023-05-11 DIAGNOSIS — N18.4 ANEMIA DUE TO STAGE 4 CHRONIC KIDNEY DISEASE TREATED WITH ERYTHROPOIETIN: Primary | ICD-10-CM

## 2023-05-11 DIAGNOSIS — D63.1 ANEMIA DUE TO STAGE 4 CHRONIC KIDNEY DISEASE TREATED WITH ERYTHROPOIETIN: Primary | ICD-10-CM

## 2023-05-11 PROCEDURE — 63600175 PHARM REV CODE 636 W HCPCS: Mod: JZ,JG

## 2023-05-11 PROCEDURE — 96372 THER/PROPH/DIAG INJ SC/IM: CPT

## 2023-05-11 RX ADMIN — EPOETIN ALFA-EPBX 20000 UNITS: 10000 INJECTION, SOLUTION INTRAVENOUS; SUBCUTANEOUS at 10:05

## 2023-05-11 NOTE — PLAN OF CARE
PT IN ROOM IN STABLE CONDITION, EPOETIN GIVEN TO ABDOMEN. PT TOLERATED WELL. PT STATES THAT THEY HAVE RECEIVED EPOETIN IN THE PAST WITHOUT SIGNS AND SYMPTOMS OF A REACTION. INSTRUCTED PATIENT TO CALL PCP IF NEEDED.

## 2023-06-08 PROCEDURE — 83550 IRON BINDING TEST: CPT | Performed by: FAMILY MEDICINE

## 2023-06-08 PROCEDURE — 85025 COMPLETE CBC W/AUTO DIFF WBC: CPT | Performed by: FAMILY MEDICINE

## 2023-06-08 PROCEDURE — 80061 LIPID PANEL: CPT | Performed by: FAMILY MEDICINE

## 2023-06-08 PROCEDURE — 80053 COMPREHEN METABOLIC PANEL: CPT | Performed by: FAMILY MEDICINE

## 2023-06-08 PROCEDURE — 83036 HEMOGLOBIN GLYCOSYLATED A1C: CPT | Performed by: FAMILY MEDICINE

## 2023-06-08 PROCEDURE — 82728 ASSAY OF FERRITIN: CPT | Performed by: FAMILY MEDICINE

## 2023-06-08 PROCEDURE — 84443 ASSAY THYROID STIM HORMONE: CPT | Performed by: FAMILY MEDICINE

## 2023-06-12 ENCOUNTER — INFUSION (OUTPATIENT)
Dept: INFUSION THERAPY | Facility: HOSPITAL | Age: 88
End: 2023-06-12
Attending: FAMILY MEDICINE
Payer: MEDICARE

## 2023-06-12 VITALS
DIASTOLIC BLOOD PRESSURE: 62 MMHG | SYSTOLIC BLOOD PRESSURE: 143 MMHG | HEART RATE: 85 BPM | OXYGEN SATURATION: 96 % | TEMPERATURE: 97 F | RESPIRATION RATE: 20 BRPM

## 2023-06-12 DIAGNOSIS — N18.4 ANEMIA DUE TO STAGE 4 CHRONIC KIDNEY DISEASE TREATED WITH ERYTHROPOIETIN: Primary | ICD-10-CM

## 2023-06-12 DIAGNOSIS — D63.1 ANEMIA DUE TO STAGE 4 CHRONIC KIDNEY DISEASE TREATED WITH ERYTHROPOIETIN: Primary | ICD-10-CM

## 2023-06-12 PROCEDURE — 63600175 PHARM REV CODE 636 W HCPCS: Mod: JZ,JG

## 2023-06-12 PROCEDURE — 96372 THER/PROPH/DIAG INJ SC/IM: CPT

## 2023-06-12 RX ADMIN — EPOETIN ALFA-EPBX 20000 UNITS: 10000 INJECTION, SOLUTION INTRAVENOUS; SUBCUTANEOUS at 10:06

## 2023-06-12 NOTE — PLAN OF CARE
Patient's blood pressure came down after patient rested on stretcher and was able to relax a little, No s/s of distress noted, Stable condition, Close to nurse's station, Family member at bedside, Lab work verified, Pharmacy notified

## 2023-06-12 NOTE — PLAN OF CARE
Injection given, Patient tolerated it well, Instructed patient to follow up with her physician as needed, Verbalized understanding, Discharged home in stable condition, No s/s of distress noted

## 2023-06-13 ENCOUNTER — OFFICE VISIT (OUTPATIENT)
Dept: FAMILY MEDICINE | Facility: CLINIC | Age: 88
End: 2023-06-13
Payer: MEDICARE

## 2023-06-13 VITALS
OXYGEN SATURATION: 97 % | TEMPERATURE: 98 F | HEART RATE: 86 BPM | DIASTOLIC BLOOD PRESSURE: 52 MMHG | SYSTOLIC BLOOD PRESSURE: 134 MMHG | BODY MASS INDEX: 21.71 KG/M2 | HEIGHT: 62 IN | WEIGHT: 118 LBS

## 2023-06-13 DIAGNOSIS — I20.9 ANGINA PECTORIS: ICD-10-CM

## 2023-06-13 DIAGNOSIS — I73.9 PERIPHERAL VASCULAR DISEASE, UNSPECIFIED: ICD-10-CM

## 2023-06-13 DIAGNOSIS — N18.4 CHRONIC KIDNEY DISEASE, STAGE IV (SEVERE): ICD-10-CM

## 2023-06-13 DIAGNOSIS — I50.32 CHRONIC DIASTOLIC (CONGESTIVE) HEART FAILURE: ICD-10-CM

## 2023-06-13 DIAGNOSIS — N18.4 ANEMIA DUE TO STAGE 4 CHRONIC KIDNEY DISEASE TREATED WITH ERYTHROPOIETIN: ICD-10-CM

## 2023-06-13 DIAGNOSIS — D63.1 ANEMIA DUE TO STAGE 4 CHRONIC KIDNEY DISEASE TREATED WITH ERYTHROPOIETIN: ICD-10-CM

## 2023-06-13 DIAGNOSIS — F33.41 RECURRENT MAJOR DEPRESSIVE DISORDER, IN PARTIAL REMISSION: ICD-10-CM

## 2023-06-13 DIAGNOSIS — J31.0 CHRONIC RHINITIS: ICD-10-CM

## 2023-06-13 DIAGNOSIS — E11.59 TYPE 2 DIABETES MELLITUS WITH OTHER CIRCULATORY COMPLICATIONS: Primary | ICD-10-CM

## 2023-06-13 PROBLEM — N18.9 ANEMIA OF CHRONIC RENAL FAILURE: Status: RESOLVED | Noted: 2023-01-17 | Resolved: 2023-06-13

## 2023-06-13 PROBLEM — S01.01XA SCALP LACERATION: Status: RESOLVED | Noted: 2023-02-08 | Resolved: 2023-06-13

## 2023-06-13 PROCEDURE — 99214 OFFICE O/P EST MOD 30 MIN: CPT | Mod: ,,, | Performed by: FAMILY MEDICINE

## 2023-06-13 PROCEDURE — 99214 PR OFFICE/OUTPT VISIT, EST, LEVL IV, 30-39 MIN: ICD-10-PCS | Mod: ,,, | Performed by: FAMILY MEDICINE

## 2023-06-13 RX ORDER — VENLAFAXINE HYDROCHLORIDE 150 MG/1
150 CAPSULE, EXTENDED RELEASE ORAL DAILY
Qty: 30 CAPSULE | Refills: 5 | Status: SHIPPED | OUTPATIENT
Start: 2023-06-13 | End: 2023-10-25 | Stop reason: SDUPTHER

## 2023-06-13 NOTE — PROGRESS NOTES
"SUBJECTIVE:  HPI    Leatha Adames is a 88 y.o. female here for Follow-up (Labs) and chronic health conditions.      Her depression has definitely improved with increasing her venlafaxine to 150 mg daily.    He does continue to have some generalized aches and pains especially in the right shoulder.  She has been picking figs recently and this seems to have exacerbated her shoulder pain.    She also continues to have exertional chest pain and dyspnea.    Her runny nose has definitely improved with Astelin.      Curts allergies, medications, history, and problem list were updated as appropriate.    ROS:  Pertinent ROS as above, otherwise negative    OBJECTIVE:  Vital signs  Visit Vitals  BP (!) 134/52 (BP Location: Right arm, Patient Position: Sitting)   Pulse 86   Temp 98.4 °F (36.9 °C) (Temporal)   Ht 5' 2.01" (1.575 m)   Wt 53.5 kg (118 lb)   SpO2 97%   BMI 21.58 kg/m²          PHYSICAL EXAM:  General:  Awake, alert, no acute distress   Eyes:  Pupils equal, round, reactive to light.  Conjunctiva normal bilaterally.  Cardiovascular: Regular rate and rhythm.  3/6 systolic murmur  Respiratory: Clear to auscultation bilaterally, normal effort  Extremities:  No peripheral edema, no cyanosis  Skin: No rashes  Psychiatric:  Mood and affect are improved    Chemistry:  Lab Results   Component Value Date     06/08/2023    K 4.6 06/08/2023    BUN 45.0 (H) 06/08/2023    CREATININE 2.03 (H) 06/08/2023    EGFRNORACEVR 24 06/08/2023    GLUCOSE 106 06/08/2023    CALCIUM 9.6 06/08/2023    ALKPHOS 29 (L) 06/08/2023    AST 31 06/08/2023    ALT 15 06/08/2023    TSH 3.450 06/08/2023    RPEUPU3TVHK 0.72 03/10/2022        Lab Results   Component Value Date    HGBA1C 5.6 06/08/2023        Hematology:  Lab Results   Component Value Date    WBC 5.49 06/08/2023    HGB 9.9 (L) 06/12/2023    MCV 89.4 06/08/2023     06/08/2023       Lipid Panel:  Lab Results   Component Value Date    CHOL 136 06/08/2023    HDL 48 06/08/2023    " DLDL 57.8 06/08/2023    TRIG 172 06/08/2023        ASSESSMENT/PLAN:  1. Type 2 diabetes mellitus with other circulatory complications  Overview:  Lab Results   Component Value Date    HGBA1C 5.6 06/08/2023         Assessment & Plan:  Hemoglobin A1c at goal despite taking no medications for several months.    Orders:  -     Hemoglobin A1C; Future; Expected date: 09/13/2023    2. Chronic kidney disease, stage IV (severe)  Overview:  Lab Results   Component Value Date    CREATININE 2.03 (H) 06/08/2023    EGFRNONAA 30 03/10/2022    EGFRNONAA 38 04/23/2021       Assessment & Plan:  Avoidance of NSAIDs    Blood pressure and sugar control    Orders:  -     Comprehensive Metabolic Panel; Future; Expected date: 09/13/2023    3. Recurrent major depressive disorder, in partial remission  -     venlafaxine (EFFEXOR-XR) 150 MG Cp24; Take 1 capsule (150 mg total) by mouth once daily.  Dispense: 30 capsule; Refill: 5    4. Peripheral vascular disease, unspecified  Assessment & Plan:  On aspirin, statin, Plavix      5. Chronic diastolic (congestive) heart failure  Assessment & Plan:  On Lasix 20 mg daily, Imdur      6. Chronic rhinitis  Assessment & Plan:  Symptoms improved with Astelin nasal spray      7. Angina pectoris  Assessment & Plan:  On aspirin, statin, Plavix, Imdur      8. Anemia due to stage 4 chronic kidney disease treated with erythropoietin  Overview:  Lab Results   Component Value Date    WBC 5.49 06/08/2023    HGB 9.9 (L) 06/12/2023    HCT 30.8 (L) 06/12/2023    MCV 89.4 06/08/2023     06/08/2023           Assessment & Plan:  On erythropoietin    Orders:  -     CBC Auto Differential; Future; Expected date: 09/13/2023  -     Iron and TIBC; Future; Expected date: 09/13/2023  -     Ferritin; Future; Expected date: 09/13/2023  -     Lipid Panel; Future; Expected date: 09/13/2023      Follow Up:  Follow up in about 3 months (around 9/13/2023) for Fasting labs, Follow-up.

## 2023-07-17 ENCOUNTER — TELEPHONE (OUTPATIENT)
Dept: GASTROENTEROLOGY | Facility: CLINIC | Age: 88
End: 2023-07-17
Payer: MEDICARE

## 2023-07-17 ENCOUNTER — HOSPITAL ENCOUNTER (EMERGENCY)
Facility: HOSPITAL | Age: 88
Discharge: ANOTHER HOSPITAL | End: 2023-07-17
Attending: FAMILY MEDICINE
Payer: MEDICARE

## 2023-07-17 ENCOUNTER — INFUSION (OUTPATIENT)
Dept: INFUSION THERAPY | Facility: HOSPITAL | Age: 88
End: 2023-07-17
Attending: FAMILY MEDICINE
Payer: MEDICARE

## 2023-07-17 VITALS
DIASTOLIC BLOOD PRESSURE: 65 MMHG | WEIGHT: 117 LBS | RESPIRATION RATE: 18 BRPM | BODY MASS INDEX: 21.39 KG/M2 | SYSTOLIC BLOOD PRESSURE: 161 MMHG | TEMPERATURE: 97 F | OXYGEN SATURATION: 99 % | HEART RATE: 74 BPM

## 2023-07-17 VITALS
HEART RATE: 64 BPM | TEMPERATURE: 97 F | OXYGEN SATURATION: 95 % | WEIGHT: 119 LBS | BODY MASS INDEX: 21.9 KG/M2 | DIASTOLIC BLOOD PRESSURE: 60 MMHG | HEIGHT: 62 IN | RESPIRATION RATE: 18 BRPM | SYSTOLIC BLOOD PRESSURE: 139 MMHG

## 2023-07-17 DIAGNOSIS — N18.4 CHRONIC KIDNEY DISEASE, STAGE IV (SEVERE): ICD-10-CM

## 2023-07-17 DIAGNOSIS — K92.2 GASTROINTESTINAL HEMORRHAGE, UNSPECIFIED GASTROINTESTINAL HEMORRHAGE TYPE: Primary | ICD-10-CM

## 2023-07-17 DIAGNOSIS — N18.9 ANEMIA IN CHRONIC KIDNEY DISEASE, UNSPECIFIED CKD STAGE: ICD-10-CM

## 2023-07-17 DIAGNOSIS — D63.1 ANEMIA IN CHRONIC KIDNEY DISEASE, UNSPECIFIED CKD STAGE: ICD-10-CM

## 2023-07-17 DIAGNOSIS — N18.4 ANEMIA DUE TO STAGE 4 CHRONIC KIDNEY DISEASE TREATED WITH ERYTHROPOIETIN: ICD-10-CM

## 2023-07-17 DIAGNOSIS — D63.1 ANEMIA DUE TO STAGE 4 CHRONIC KIDNEY DISEASE TREATED WITH ERYTHROPOIETIN: ICD-10-CM

## 2023-07-17 DIAGNOSIS — E11.59 TYPE 2 DIABETES MELLITUS WITH OTHER CIRCULATORY COMPLICATIONS: Primary | ICD-10-CM

## 2023-07-17 LAB
ABO + RH BLD: NORMAL
ABO AND RH: NORMAL
ABORH RETYPE: NORMAL
ALBUMIN SERPL-MCNC: 4.2 G/DL (ref 3.4–5)
ALBUMIN/GLOB SERPL: 1.6 RATIO
ALP SERPL-CCNC: 33 UNIT/L (ref 50–144)
ALT SERPL-CCNC: 17 UNIT/L (ref 1–45)
ANION GAP SERPL CALC-SCNC: 6 MEQ/L (ref 2–13)
ANTIBODY SCREEN: NORMAL
AST SERPL-CCNC: 29 UNIT/L (ref 14–36)
BASOPHILS # BLD AUTO: 0.06 X10(3)/MCL (ref 0.01–0.08)
BASOPHILS NFR BLD AUTO: 1 % (ref 0.1–1.2)
BILIRUBIN DIRECT+TOT PNL SERPL-MCNC: 0.3 MG/DL (ref 0–1)
BLD PROD TYP BPU: NORMAL
BLOOD UNIT EXPIRATION DATE: NORMAL
BLOOD UNIT TYPE CODE: 600
BUN SERPL-MCNC: 37 MG/DL (ref 7–20)
CALCIUM SERPL-MCNC: 9.9 MG/DL (ref 8.4–10.2)
CHLORIDE SERPL-SCNC: 104 MMOL/L (ref 98–110)
CHOLEST SERPL-MCNC: 140 MG/DL (ref 0–200)
CO2 SERPL-SCNC: 29 MMOL/L (ref 21–32)
CREAT SERPL-MCNC: 1.85 MG/DL (ref 0.66–1.25)
CREAT/UREA NIT SERPL: 20 (ref 12–20)
CROSSMATCH INTERPRETATION: NORMAL
DISPENSE STATUS: NORMAL
EOSINOPHIL # BLD AUTO: 0.41 X10(3)/MCL (ref 0.04–0.36)
EOSINOPHIL NFR BLD AUTO: 6.7 % (ref 0.7–7)
ERYTHROCYTE [DISTWIDTH] IN BLOOD BY AUTOMATED COUNT: 14.5 % (ref 11–14.5)
EST. AVERAGE GLUCOSE BLD GHB EST-MCNC: 119.8 MG/DL (ref 70–115)
FERRITIN SERPL-MCNC: 77 NG/ML (ref 6.24–264)
GFR SERPLBLD CREATININE-BSD FMLA CKD-EPI: 26 MLS/MIN/1.73/M2
GLOBULIN SER-MCNC: 2.6 GM/DL (ref 2–3.9)
GLUCOSE SERPL-MCNC: 147 MG/DL (ref 70–115)
HBA1C MFR BLD: 5.8 % (ref 4–6)
HCT VFR BLD AUTO: 24.2 % (ref 36–48)
HDLC SERPL-MCNC: 47 MG/DL (ref 40–60)
HGB BLD-MCNC: 7.7 G/DL (ref 11.8–16)
IMM GRANULOCYTES # BLD AUTO: 0.03 X10(3)/MCL (ref 0–0.03)
IMM GRANULOCYTES NFR BLD AUTO: 0.5 % (ref 0–0.5)
IRON SATN MFR SERPL: 19 % (ref 20–50)
IRON SERPL-MCNC: 78 UG/DL (ref 50–170)
LDLC SERPL DIRECT ASSAY-SCNC: 59 MG/DL (ref 30–100)
LYMPHOCYTES # BLD AUTO: 1.4 X10(3)/MCL (ref 1.16–3.74)
LYMPHOCYTES NFR BLD AUTO: 22.9 % (ref 20–55)
MCH RBC QN AUTO: 29.4 PG (ref 27–34)
MCHC RBC AUTO-ENTMCNC: 31.8 G/DL (ref 31–37)
MCV RBC AUTO: 92.4 FL (ref 79–99)
MONOCYTES # BLD AUTO: 0.39 X10(3)/MCL (ref 0.24–0.36)
MONOCYTES NFR BLD AUTO: 6.4 % (ref 4.7–12.5)
NEUTROPHILS # BLD AUTO: 3.83 X10(3)/MCL (ref 1.56–6.13)
NEUTROPHILS NFR BLD AUTO: 62.5 % (ref 37–73)
NRBC BLD AUTO-RTO: 0 %
PLATELET # BLD AUTO: 235 X10(3)/MCL (ref 140–371)
PMV BLD AUTO: 9.8 FL (ref 9.4–12.4)
POTASSIUM SERPL-SCNC: 4.2 MMOL/L (ref 3.5–5.1)
PROT SERPL-MCNC: 6.8 GM/DL (ref 6.3–8.2)
RBC # BLD AUTO: 2.62 X10(6)/MCL (ref 4–5.1)
SODIUM SERPL-SCNC: 139 MMOL/L (ref 135–145)
SPECIMEN OUTDATE: NORMAL
TIBC SERPL-MCNC: 325 UG/DL (ref 70–310)
TIBC SERPL-MCNC: 403 UG/DL (ref 250–450)
TRANSFERRIN SERPL-MCNC: 371 MG/DL
TRIGL SERPL-MCNC: 205 MG/DL (ref 30–200)
UNIT NUMBER: NORMAL
WBC # SPEC AUTO: 6.12 X10(3)/MCL (ref 4–11.5)

## 2023-07-17 PROCEDURE — 36415 COLL VENOUS BLD VENIPUNCTURE: CPT

## 2023-07-17 PROCEDURE — C9113 INJ PANTOPRAZOLE SODIUM, VIA: HCPCS | Performed by: FAMILY MEDICINE

## 2023-07-17 PROCEDURE — 96374 THER/PROPH/DIAG INJ IV PUSH: CPT

## 2023-07-17 PROCEDURE — 25000003 PHARM REV CODE 250: Performed by: FAMILY MEDICINE

## 2023-07-17 PROCEDURE — 63600175 PHARM REV CODE 636 W HCPCS: Performed by: FAMILY MEDICINE

## 2023-07-17 PROCEDURE — 85025 COMPLETE CBC W/AUTO DIFF WBC: CPT

## 2023-07-17 PROCEDURE — 82728 ASSAY OF FERRITIN: CPT

## 2023-07-17 PROCEDURE — 96372 THER/PROPH/DIAG INJ SC/IM: CPT | Mod: 59

## 2023-07-17 PROCEDURE — 36430 TRANSFUSION BLD/BLD COMPNT: CPT

## 2023-07-17 PROCEDURE — 63600175 PHARM REV CODE 636 W HCPCS: Mod: JZ,EC,JG

## 2023-07-17 PROCEDURE — P9016 RBC LEUKOCYTES REDUCED: HCPCS | Performed by: FAMILY MEDICINE

## 2023-07-17 PROCEDURE — 80061 LIPID PANEL: CPT

## 2023-07-17 PROCEDURE — 80053 COMPREHEN METABOLIC PANEL: CPT

## 2023-07-17 PROCEDURE — 36415 COLL VENOUS BLD VENIPUNCTURE: CPT | Performed by: FAMILY MEDICINE

## 2023-07-17 PROCEDURE — 86920 COMPATIBILITY TEST SPIN: CPT | Performed by: FAMILY MEDICINE

## 2023-07-17 PROCEDURE — 96375 TX/PRO/DX INJ NEW DRUG ADDON: CPT

## 2023-07-17 PROCEDURE — 83036 HEMOGLOBIN GLYCOSYLATED A1C: CPT

## 2023-07-17 PROCEDURE — 86900 BLOOD TYPING SEROLOGIC ABO: CPT | Performed by: FAMILY MEDICINE

## 2023-07-17 PROCEDURE — 83550 IRON BINDING TEST: CPT

## 2023-07-17 PROCEDURE — 99285 EMERGENCY DEPT VISIT HI MDM: CPT | Mod: 25

## 2023-07-17 RX ORDER — HYDROCODONE BITARTRATE AND ACETAMINOPHEN 500; 5 MG/1; MG/1
TABLET ORAL
Status: DISCONTINUED | OUTPATIENT
Start: 2023-07-17 | End: 2023-07-17 | Stop reason: HOSPADM

## 2023-07-17 RX ORDER — COLESTIPOL HYDROCHLORIDE 5 G/5G
5 GRANULE, FOR SUSPENSION ORAL 2 TIMES DAILY
COMMUNITY

## 2023-07-17 RX ORDER — ACETAMINOPHEN 500 MG
1000 TABLET ORAL
Status: COMPLETED | OUTPATIENT
Start: 2023-07-17 | End: 2023-07-17

## 2023-07-17 RX ORDER — PANTOPRAZOLE SODIUM 40 MG/10ML
40 INJECTION, POWDER, LYOPHILIZED, FOR SOLUTION INTRAVENOUS
Status: COMPLETED | OUTPATIENT
Start: 2023-07-17 | End: 2023-07-17

## 2023-07-17 RX ADMIN — ACETAMINOPHEN 1000 MG: 500 TABLET, FILM COATED ORAL at 02:07

## 2023-07-17 RX ADMIN — EPOETIN ALFA-EPBX 20000 UNITS: 10000 INJECTION, SOLUTION INTRAVENOUS; SUBCUTANEOUS at 10:07

## 2023-07-17 RX ADMIN — PANTOPRAZOLE SODIUM 40 MG: 40 INJECTION, POWDER, FOR SOLUTION INTRAVENOUS at 12:07

## 2023-07-17 NOTE — PLAN OF CARE
Pt in Outpatient room 112, checked results of CBC, H/H noted to have significant decrease since last month, called Dr. Denise's office, informed of pt's results.  states to give the Retacrit and send to ER for possible blood transfusion.

## 2023-07-17 NOTE — PLAN OF CARE
Called pt's PCP, Dr. Linares. Spoke with nurse Corey. States Dr. Linares and Dr. Pepper are both out of the office today.   Spoke with patient and daughter, decided to go to ER. Pt given subQ injections then wheeled to ER.

## 2023-07-17 NOTE — ED PROVIDER NOTES
Encounter Date: 7/17/2023       History     Chief Complaint   Patient presents with    GI Problem     States increase in dark stools w/ dizziness and SOB x 1 week. Pt coming from outpatient w/ lower H&H. Pt does states ABD pain with bowel movement. Family states pt having to get blood transfusion at [facility] 1 yr ago.      Patient brought to the emergency room by her family.  She is been getting outpatient erythropoietin injections, and lab work showed a decrease in H&H compared to last month (9.9/30 to 7.4/24).  Patient admits for the past week she is been having black tarry stools some mild abdominal cramping from time to time and dizziness on moving around.  She takes Plavix for coronary artery disease.  She is never had a GI bleed, but she does see Dr. Morgan for GERD.    The history is provided by the patient and a relative.   Review of patient's allergies indicates:   Allergen Reactions    Ace inhibitors Other (See Comments)    Amoxicillin-pot clavulanate Other (See Comments)    Celecoxib Other (See Comments)    Codeine     Hydrocodone-acetaminophen     Irbesartan Other (See Comments)    Penicillins      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     Urge incontinence      Past Surgical History:   Procedure Laterality Date    CARDIAC CATHETERIZATION  07/24/2008    CHOLECYSTECTOMY       CYSTOCELE REPAIR  2001    EPIDURAL STEROID INJECTION  06/2020    ESOPHAGOGASTRODUODENOSCOPY (EGD) WITH DILATION  05/14/2021    HEMORRHOID SURGERY      HYSTERECTOMY      MASTECTOMY  1995    PERCUTANEOUS TRANSLUMINAL BALLOON ANGIOPLASTY OF CORONARY ARTERY  08/01/2017    PERIPHERAL ARTERIAL STENT GRAFT  09/12/2018     Family History   Problem Relation Age of Onset    Breast cancer Mother     Heart disease Father     Epilepsy Father      Social History     Tobacco Use    Smoking status: Former    Smokeless tobacco: Never   Substance Use Topics    Alcohol use: Not Currently    Drug use: Yes     Types: Other-see comments     Comment: CBD Drops and lotion     Review of Systems   Constitutional:  Positive for fatigue. Negative for fever.   HENT:  Negative for sore throat.    Respiratory:  Negative for shortness of breath.    Cardiovascular:  Negative for chest pain.   Gastrointestinal:  Positive for abdominal pain and blood in stool. Negative for nausea.   Genitourinary:  Negative for dysuria.   Musculoskeletal:  Negative for back pain.   Skin:  Negative for rash.   Neurological:  Negative for weakness.   Hematological:  Does not bruise/bleed easily.   All other systems reviewed and are negative.    Physical Exam     Initial Vitals   BP Pulse Resp Temp SpO2   07/17/23 1116 07/17/23 1116 07/17/23 1116 07/17/23 1300 07/17/23 1116   (!) 157/65 66 18 97 °F (36.1 °C) 100 %      MAP       --                Physical Exam    Nursing note and vitals reviewed.  Constitutional: She appears well-developed and well-nourished.   HENT:   Head: Normocephalic and atraumatic.   Eyes: EOM are normal. Pupils are equal, round, and reactive to light.   Pale conjunctiva   Neck: Neck supple.   Normal range of motion.  Cardiovascular:  Normal rate, regular rhythm and normal heart sounds.           Pulmonary/Chest: Breath sounds normal.   Abdominal: Abdomen is soft. Bowel sounds are normal. There is no abdominal tenderness. There is no rebound.    Musculoskeletal:         General: No edema. Normal range of motion.      Cervical back: Normal range of motion and neck supple.     Neurological: She is alert and oriented to person, place, and time.   Skin: Skin is warm and dry. Capillary refill takes less than 2 seconds.   Psychiatric: She has a normal mood and affect.       ED Course   Procedures  Labs Reviewed   TYPE & SCREEN   ABORH RETYPE   PREPARE RBC SOFT          Imaging Results    None          Medications   0.9%  NaCl infusion (for blood administration) (has no administration in time range)   pantoprazole injection 40 mg (40 mg Intravenous Given 7/17/23 1201)     Medical Decision Making:   Initial Assessment:   Anemia, abd pain  Differential Diagnosis:   GI bleed / more likely upper, chronic anemia.  ED Management:  Review of labs done an hour ago shows significant anemia and change in H&H over the course of the past month.  She is on Plavix and is having melanotic stools consistent with a GI bleed.    Will transfuse the patient 1 unit of packed red blood cells here, put on Protonix.  Patient needs endoscopy, and after discussion with the family they are okay with transfusion and a requesting transfer to Dr. Morgan's service.    Pt accepted to [facility] for eval by Dr. Morgan.                        Clinical Impression:   Final diagnoses:  [K92.2] Gastrointestinal hemorrhage, unspecified gastrointestinal hemorrhage type (Primary)        ED Disposition Condition    Transfer to Another Facility Stable                Negro Almaraz MD  07/17/23 1233       Negro Almaraz MD  07/17/23 1622

## 2023-07-17 NOTE — TELEPHONE ENCOUNTER
----- Message from Noa Go sent at 7/17/2023 12:36 PM CDT -----  Contact: [facility] [place] call  pt is currently @hospital and the hospital is wanting to do a scope regarding loss of blood , pt is uncomfortable with having this procdedure done and wants a call back

## 2023-07-17 NOTE — TELEPHONE ENCOUNTER
Returned pt's daughter Lexie's call. Pt is currently at [facility] receiving a blood transfusion since her numbers were low (7.4). The physician at [place] would like the pt to have a colonoscopy. Both pt and pt's daughter do not feel conformable having the colonoscopy done in [place].  Pt's daughter wants to know if [facility] thinks the pt needs a colonoscopy and if so, the pt's daughter wants [facility] do to it?  Her second question is can the pt be discharged after the blood transfusion and release to [facility] care?  I told Lexie that I would call her back as soon as I got a response from [facility]. [facility]

## 2023-07-17 NOTE — TELEPHONE ENCOUNTER
She should stay at [facility] until discharge is recommended. If she is transferred to [facility], then GI doctor on call would be consulted if needed and would perform the scopes if needed.   MLC

## 2023-07-18 ENCOUNTER — OUTSIDE PLACE OF SERVICE (OUTPATIENT)
Dept: GASTROENTEROLOGY | Facility: CLINIC | Age: 88
End: 2023-07-18
Payer: MEDICARE

## 2023-07-18 ENCOUNTER — OUTSIDE PLACE OF SERVICE (OUTPATIENT)
Dept: GASTROENTEROLOGY | Facility: CLINIC | Age: 88
End: 2023-07-18

## 2023-07-18 PROCEDURE — 43239 EGD BIOPSY SINGLE/MULTIPLE: CPT | Mod: ,,, | Performed by: INTERNAL MEDICINE

## 2023-07-18 PROCEDURE — 43239 PR EGD, FLEX, W/BIOPSY, SGL/MULTI: ICD-10-PCS | Mod: ,,, | Performed by: INTERNAL MEDICINE

## 2023-07-18 PROCEDURE — 99223 PR INITIAL HOSPITAL CARE,LEVL III: ICD-10-PCS | Mod: 25,,, | Performed by: INTERNAL MEDICINE

## 2023-07-18 PROCEDURE — 99223 1ST HOSP IP/OBS HIGH 75: CPT | Mod: 25,,, | Performed by: INTERNAL MEDICINE

## 2023-07-18 NOTE — TELEPHONE ENCOUNTER
Called pt's daughter Lexie back and conveyed the message [facility] stated. Lexie mentioned that pt is not in [facility]. I told her that Dr. Morales is on call and he would be doing her consult. sami

## 2023-07-19 ENCOUNTER — TELEPHONE (OUTPATIENT)
Dept: GASTROENTEROLOGY | Facility: CLINIC | Age: 88
End: 2023-07-19
Payer: MEDICARE

## 2023-07-19 NOTE — TELEPHONE ENCOUNTER
Pt was seen by Dr Morales at [facility]. He recommend CE. Info scanned into chart.

## 2023-07-23 ENCOUNTER — TELEPHONE (OUTPATIENT)
Dept: GASTROENTEROLOGY | Facility: CLINIC | Age: 88
End: 2023-07-23
Payer: MEDICARE

## 2023-07-23 DIAGNOSIS — D50.9 IRON DEFICIENCY ANEMIA, UNSPECIFIED IRON DEFICIENCY ANEMIA TYPE: Primary | ICD-10-CM

## 2023-07-23 DIAGNOSIS — K92.1 MELENA: ICD-10-CM

## 2023-07-24 NOTE — TELEPHONE ENCOUNTER
Spoke with pt's daughter and notified of pill cam. She stated that pt is getting weaker and weaker. She is trying to get in to be seen by PCP. She is requesting if they can not get her in if our office can order her some blood work. Pt refuses to go to hospital again.

## 2023-07-24 NOTE — TELEPHONE ENCOUNTER
----- Message from Melanie Kim sent at 7/24/2023 10:02 AM CDT -----  Contact: Pt daughter  Pt daughter is calling to see if the pt has to fast before labs . Please give her a call at [phone]

## 2023-07-24 NOTE — TELEPHONE ENCOUNTER
Recent admission due to anemia- pending SBCE. Order signed for bloodwork. If her weakness worsens or having SOB needs to to go to ER.  KN

## 2023-07-25 ENCOUNTER — LAB VISIT (OUTPATIENT)
Dept: LAB | Facility: HOSPITAL | Age: 88
End: 2023-07-25
Attending: INTERNAL MEDICINE
Payer: MEDICARE

## 2023-07-25 DIAGNOSIS — N18.9 ANEMIA IN CHRONIC KIDNEY DISEASE, UNSPECIFIED CKD STAGE: ICD-10-CM

## 2023-07-25 DIAGNOSIS — D63.1 ANEMIA IN CHRONIC KIDNEY DISEASE, UNSPECIFIED CKD STAGE: ICD-10-CM

## 2023-07-25 LAB
HCT VFR BLD AUTO: 31.1 % (ref 36–48)
HGB BLD-MCNC: 9.8 G/DL (ref 11.8–16)

## 2023-07-25 PROCEDURE — 85014 HEMATOCRIT: CPT

## 2023-07-25 PROCEDURE — 36415 COLL VENOUS BLD VENIPUNCTURE: CPT

## 2023-07-26 ENCOUNTER — TELEPHONE (OUTPATIENT)
Dept: GASTROENTEROLOGY | Facility: CLINIC | Age: 88
End: 2023-07-26
Payer: MEDICARE

## 2023-07-26 NOTE — TELEPHONE ENCOUNTER
----- Message from Becky Maradiaga sent at 7/26/2023 10:49 AM CDT -----  States she is calling regarding her Lab results. Please call Lexie Adames (daughter) [phone]. Thank you

## 2023-07-26 NOTE — TELEPHONE ENCOUNTER
Returned Lexie pt's daughter call she was following up the lab and pill cam results. I informed her that we haven't received them yet and still waiting for [facility] to review them. Once, she does we will contact her with the results. Lexie thanked me for my time and acknowledge she understood. richard

## 2023-07-27 ENCOUNTER — OFFICE VISIT (OUTPATIENT)
Dept: FAMILY MEDICINE | Facility: CLINIC | Age: 88
End: 2023-07-27
Payer: MEDICARE

## 2023-07-27 VITALS
HEIGHT: 62 IN | TEMPERATURE: 98 F | DIASTOLIC BLOOD PRESSURE: 66 MMHG | HEART RATE: 78 BPM | BODY MASS INDEX: 22.56 KG/M2 | OXYGEN SATURATION: 99 % | SYSTOLIC BLOOD PRESSURE: 120 MMHG | WEIGHT: 122.63 LBS

## 2023-07-27 DIAGNOSIS — N18.4 ANEMIA DUE TO STAGE 4 CHRONIC KIDNEY DISEASE TREATED WITH ERYTHROPOIETIN: ICD-10-CM

## 2023-07-27 DIAGNOSIS — D50.0 IRON DEFICIENCY ANEMIA DUE TO CHRONIC BLOOD LOSS: Primary | ICD-10-CM

## 2023-07-27 DIAGNOSIS — N39.42 URINARY INCONTINENCE WITHOUT SENSORY AWARENESS: ICD-10-CM

## 2023-07-27 DIAGNOSIS — D63.1 ANEMIA DUE TO STAGE 4 CHRONIC KIDNEY DISEASE TREATED WITH ERYTHROPOIETIN: ICD-10-CM

## 2023-07-27 PROBLEM — D50.9 IRON DEFICIENCY ANEMIA: Status: ACTIVE | Noted: 2023-07-27

## 2023-07-27 PROCEDURE — 99214 PR OFFICE/OUTPT VISIT, EST, LEVL IV, 30-39 MIN: ICD-10-PCS | Mod: ,,, | Performed by: FAMILY MEDICINE

## 2023-07-27 PROCEDURE — 99214 OFFICE O/P EST MOD 30 MIN: CPT | Mod: ,,, | Performed by: FAMILY MEDICINE

## 2023-07-27 RX ORDER — TOLTERODINE 4 MG/1
4 CAPSULE, EXTENDED RELEASE ORAL DAILY
Qty: 90 CAPSULE | Refills: 3 | Status: SHIPPED | OUTPATIENT
Start: 2023-07-27 | End: 2023-08-01

## 2023-07-27 NOTE — PROGRESS NOTES
"SUBJECTIVE:  HPI    Leatha Adames is a 88 y.o. female here for Hospital Follow Up.  She was recently hospitalized with symptomatic anemia secondary to chronic iron-deficiency anemia related to blood loss.  She underwent EGD which showed some esophagitis and gastritis.  She just completed capsule endoscopy and the results are pending.  She feels great today.  She feels less weak.  She denies any abdominal pain, bright red blood per rectum, melena.    Does report that she is having some night sweats.  We discussed that this is most likely related to venlafaxine therapy.  She is not ready to discontinue venlafaxine.    She does report that Toviaz is too expensive for her and she discontinued it but she is now having increase in incidence of urinary incontinence episodes.    Cruts allergies, medications, history, and problem list were updated as appropriate.    ROS:  Pertinent ROS as above, otherwise negative    OBJECTIVE:  Vital signs  Visit Vitals  /66 (BP Location: Left arm, Patient Position: Sitting)   Pulse 78   Temp 98.4 °F (36.9 °C) (Temporal)   Ht 5' 1.81" (1.57 m)   Wt 55.6 kg (122 lb 9.6 oz)   SpO2 99%   BMI 22.56 kg/m²          PHYSICAL EXAM:  General:  Awake, alert, no acute distress   Eyes:  Pupils equal, round, reactive to light.  Conjunctiva normal bilaterally.  Cardiovascular: Regular rate and rhythm with a 2/6 systolic murmur.  Respiratory: Clear to auscultation bilaterally, normal effort  Abdomen: Soft, nontender, nondistended, no hepatosplenomegaly or masses  Extremities:  No peripheral edema, no cyanosis  Skin: No rashes    I did review her hospitalization records.      ASSESSMENT/PLAN:  1. 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 loss  Overview:  Lab Results   Component Value Date    WBC 6.12 07/17/2023    HGB 9.8 (L) 07/25/2023    HCT 31.1 (L) 07/25/2023    MCV 92.4 07/17/2023     07/17/2023       Lab Results   Component Value Date    UIBC 325 (H) 07/17/2023    IRON 78 07/17/2023 "    TRANS 371 07/17/2023    TIBC 403 07/17/2023    LABIRON 19 (L) 07/17/2023          Assessment & Plan:  Because of chronic blood loss, we will discontinue Plavix    Continue scheduled Epogen injections and IV iron as needed    Keep follow-up with GI to follow up capsule endoscopy results.      2. Anemia due to stage 4 chronic kidney disease treated with erythropoietin  Overview:  Lab Results   Component Value Date    WBC 6.12 07/17/2023    HGB 9.8 (L) 07/25/2023    HCT 31.1 (L) 07/25/2023    MCV 92.4 07/17/2023     07/17/2023             3. Urinary incontinence without sensory awareness  Assessment & Plan:  Changed her Toviaz to Tolterodine 4 mg daily extended release due to cost    Orders:  -     tolterodine (DETROL LA) 4 MG 24 hr capsule; Take 1 capsule (4 mg total) by mouth once daily.  Dispense: 90 capsule; Refill: 3      Keep follow-up as scheduled in 6-8 weeks.

## 2023-07-27 NOTE — ASSESSMENT & PLAN NOTE
Because of chronic blood loss, we will discontinue Plavix    Continue scheduled Epogen injections and IV iron as needed    Keep follow-up with GI to follow up capsule endoscopy results.  
Changed her Toviaz to Tolterodine 4 mg daily extended release due to cost  
Quality 130: Documentation Of Current Medications In The Medical Record: Current Medications Documented
Detail Level: Detailed

## 2023-07-31 NOTE — TELEPHONE ENCOUNTER
Returned pt's daughter Lexie call. Told her that would be fine for pt to come into her appt at 11:45 vs 2:00pm, since the pt has another appt. I confirmed with ABRIL. richard

## 2023-08-01 ENCOUNTER — OFFICE VISIT (OUTPATIENT)
Dept: GASTROENTEROLOGY | Facility: CLINIC | Age: 88
End: 2023-08-01
Payer: MEDICARE

## 2023-08-01 ENCOUNTER — OUTSIDE PLACE OF SERVICE (OUTPATIENT)
Dept: GASTROENTEROLOGY | Facility: CLINIC | Age: 88
End: 2023-08-01

## 2023-08-01 VITALS
HEIGHT: 63 IN | WEIGHT: 122 LBS | SYSTOLIC BLOOD PRESSURE: 147 MMHG | HEART RATE: 84 BPM | BODY MASS INDEX: 21.62 KG/M2 | DIASTOLIC BLOOD PRESSURE: 73 MMHG | OXYGEN SATURATION: 97 %

## 2023-08-01 DIAGNOSIS — R11.0 NAUSEA: ICD-10-CM

## 2023-08-01 DIAGNOSIS — K21.9 GASTROESOPHAGEAL REFLUX DISEASE, UNSPECIFIED WHETHER ESOPHAGITIS PRESENT: ICD-10-CM

## 2023-08-01 DIAGNOSIS — R10.31 RIGHT LOWER QUADRANT PAIN: ICD-10-CM

## 2023-08-01 DIAGNOSIS — Z86.010 HISTORY OF COLON POLYPS: ICD-10-CM

## 2023-08-01 DIAGNOSIS — K52.9 CHRONIC DIARRHEA: ICD-10-CM

## 2023-08-01 DIAGNOSIS — R10.13 ABDOMINAL PAIN, EPIGASTRIC: ICD-10-CM

## 2023-08-01 DIAGNOSIS — R26.89 RAPID FATIGUE OF GAIT: ICD-10-CM

## 2023-08-01 DIAGNOSIS — K31.A0 GASTRIC INTESTINAL METAPLASIA: ICD-10-CM

## 2023-08-01 DIAGNOSIS — D50.9 IRON DEFICIENCY ANEMIA, UNSPECIFIED IRON DEFICIENCY ANEMIA TYPE: Primary | ICD-10-CM

## 2023-08-01 PROCEDURE — 99215 PR OFFICE/OUTPT VISIT, EST, LEVL V, 40-54 MIN: ICD-10-PCS | Mod: S$GLB,,, | Performed by: INTERNAL MEDICINE

## 2023-08-01 PROCEDURE — 91110 GI TRC IMG INTRAL ESOPH-ILE: CPT | Mod: 26,,, | Performed by: INTERNAL MEDICINE

## 2023-08-01 PROCEDURE — 91110 PR GI TRACT CAPSULE ENDOSCOPY: ICD-10-PCS | Mod: 26,,, | Performed by: INTERNAL MEDICINE

## 2023-08-01 PROCEDURE — 99215 OFFICE O/P EST HI 40 MIN: CPT | Mod: S$GLB,,, | Performed by: INTERNAL MEDICINE

## 2023-08-01 RX ORDER — ONDANSETRON 4 MG/1
4 TABLET, ORALLY DISINTEGRATING ORAL EVERY 6 HOURS PRN
Qty: 20 TABLET | Refills: 0 | Status: SHIPPED | OUTPATIENT
Start: 2023-08-01 | End: 2023-08-15

## 2023-08-01 RX ORDER — FESOTERODINE FUMARATE 8 MG/1
TABLET, EXTENDED RELEASE ORAL
COMMUNITY
End: 2023-08-15

## 2023-08-01 NOTE — LETTER
August 1, 2023        Dennys Linares MD  [address] [facility]  [place] [address]             [place] [facility]  [address] [address]  Phone: [phone]  Fax: [phone]   Patient: Leatha Adames   MR Number: [id number]   YOB: 1935   Date of Visit: 8/1/2023       Dear Dr. Linares:    Thank you for referring Leatha Adames to me for evaluation. Attached you will find relevant portions of my assessment and plan of care.    If you have questions, please do not hesitate to call me. I look forward to following Leatha Adames along with you.    Sincerely,      MD SANIYA Naranjo MD    [facility]

## 2023-08-01 NOTE — PROGRESS NOTES
Clinic Note    Reason for visit:  The primary encounter diagnosis was Iron deficiency anemia, unspecified iron deficiency anemia type. Diagnoses of Gastric intestinal metaplasia, Abdominal pain, epigastric, Right lower quadrant pain, Gastroesophageal reflux disease, unspecified whether esophagitis present, Chronic diarrhea, History of colon polyps, Rapid fatigue of gait, and Nausea were also pertinent to this visit.    PCP: Dennys Linares       HPI:  This is a 88 y.o. female here for hospital follow up. Daughters present. Transferred from [facility] 7/17/2023 due to anemia, melena. Hgb dropped from 10 to 7.7 in 1 month. Received 1U PRBC and Venofer IV x 1 dose. Had EGD with Dr. Morales that showed non-erosive gastritis. She had capsule endoscopy that was normal. At that time, she was having black stool for 2-3 days. Since then, she has not had any black stool but stool is dark. No BRBPR. Reports BMs normal lately, has only taken colestipol twice in the past 3 months. She also reports RLQ tenderness, worse yesterday, that radiates to epigastric area. She has nausea that started today. She states her appetite is normal, eating well. Has gained a few pounds in past month. She has been off the Plavix since 7/27/2023 per Dr. Linares's orders.     Patient with hx of esophageal stricture, gastric IM, and chronic diarrhea- on cholestyramine 1 packet daily. Next appt with Dr. Denise is 9/19/2023.    On Discharge 7/19/2023: H/H 9.5L/30.3L MCV 91.5  7/7/2023 H/H 9.8L/31.1L    7/2023 Capsule endoscopy was normal       EGD 7/18/2023- HH, non-erosive gastritis, GBx chronic inactive gastritis w/o H. pylori    8/2022 CT AP w/o: wnl.     9/15/2021 - Speech Pathology Evaluation - Overall, oral pharyngeal phases wnl, mild-mod pharyngeal residue, mild-mod cervical narrowing. Now on ISMN     EM 9/2021: no chicago classification found     EGD/Colonoscopy 5/14/2021: benign gastroesophageal stricture 13 mm dilated to 15 mm. DBx  nl, GBx mild chr inact gastritis and neuroendocrine cell hyperplasia and focal IM w/o Hp, EBx reflux, 3 TA.      Review of Systems   Constitutional:  Positive for diaphoresis. Negative for fatigue, fever and unexpected weight change.   HENT:  Positive for postnasal drip and trouble swallowing. Negative for mouth sores and sore throat.    Eyes:  Negative for pain, discharge and eye dryness.   Respiratory:  Positive for shortness of breath and wheezing. Negative for apnea, cough, choking and chest tightness.    Cardiovascular:  Negative for chest pain, palpitations and leg swelling.   Gastrointestinal:  Positive for nausea. Negative for abdominal distention, abdominal pain, anal bleeding, blood in stool, change in bowel habit, constipation, diarrhea, rectal pain, vomiting, reflux, fecal incontinence and change in bowel habit.   Genitourinary:  Negative for bladder incontinence, dysuria and hematuria.   Musculoskeletal:  Negative for arthralgias, back pain and joint swelling.   Integumentary:  Negative for color change and rash.   Allergic/Immunologic: Negative for environmental allergies and food allergies.   Neurological:  Positive for headaches. Negative for seizures.   Hematological:  Negative for adenopathy. Does not bruise/bleed easily.        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     Urge  incontinence      Past Surgical History:   Procedure Laterality Date    CARDIAC CATHETERIZATION  07/24/2008    CHOLECYSTECTOMY      CYSTOCELE REPAIR  2001    EPIDURAL STEROID INJECTION  06/2020    ESOPHAGOGASTRODUODENOSCOPY (EGD) WITH DILATION  05/14/2021    HEMORRHOID SURGERY      HYSTERECTOMY      MASTECTOMY  1995    PERCUTANEOUS TRANSLUMINAL BALLOON ANGIOPLASTY OF CORONARY ARTERY  08/01/2017    PERIPHERAL ARTERIAL STENT GRAFT  09/12/2018     Family History   Problem Relation Age of Onset    Breast cancer Mother     Heart disease Father     Epilepsy Father      Social History     Tobacco Use    Smoking status: Former     Current packs/day: 0.00    Smokeless tobacco: Never   Substance Use Topics    Alcohol use: Not Currently    Drug use: Yes     Types: Other-see comments     Comment: CBD Drops and lotion     Review of patient's allergies indicates:   Allergen Reactions    Ace inhibitors Other (See Comments)    Amoxicillin-pot clavulanate Other (See Comments)    Celecoxib Other (See Comments)    Codeine     Hydrocodone-acetaminophen     Irbesartan Other (See Comments)    Penicillins         Medication List with Changes/Refills   New Medications    ONDANSETRON (ZOFRAN-ODT) 4 MG TBDL    Take 1 tablet (4 mg total) by mouth every 6 (six) hours as needed (nausea).   Current Medications    ACETAMINOPHEN (TYLENOL) 500 MG TABLET    Take 1,000 mg by mouth every 8 (eight) hours as needed for Pain.    ASPIRIN 81 MG CHEW    aspirin 81 mg chewable tablet   Chew 1 tablet every day by oral route.    ATORVASTATIN (LIPITOR) 40 MG TABLET    Take 40 mg by mouth every evening.    AZELASTINE (ASTELIN) 137 MCG (0.1 %) NASAL SPRAY    2 sprays (274 mcg total) by Nasal route 2 (two) times daily.    CALCITRIOL (ROCALTROL) 0.5 MCG CAP    Take 0.5 mcg by mouth once daily.    CHOLESTYRAMINE (QUESTRAN) 4 GRAM PACKET    Take 1 packet by mouth 2 (two) times daily.    CLONAZEPAM (KLONOPIN) 1 MG TABLET    clonazepam 1 mg tablet   Take 1 tablet every  "day by oral route at bedtime.    COLESTIPOL (COLESTID) 5 GRAM PACK    Take 5 g by mouth 2 (two) times daily.    FENOFIBRATE 160 MG TAB    TAKE 1 TABLET DAILY    FESOTERODINE (TOVIAZ) 8 MG TB24    Take by mouth.    FIBER CHOICE ORAL    Take by mouth 4 (four) times daily as needed.    FUROSEMIDE (LASIX) 20 MG TABLET    furosemide 20 mg tablet   Take 1 tablet every day by oral route.    ISOSORBIDE MONONITRATE (IMDUR) 30 MG 24 HR TABLET    Take 30 mg by mouth.    LORATADINE (CLARITIN) 10 MG TABLET    Take 10 mg by mouth once daily.    NITROGLYCERIN (NITROSTAT) 0.4 MG SL TABLET    Place 0.4 mg under the tongue.    ONDANSETRON (ZOFRAN) 8 MG TABLET    Take 8 mg by mouth.    PANTOPRAZOLE (PROTONIX) 40 MG TABLET    Take 1 tablet (40 mg total) by mouth once daily.    ROPINIROLE (REQUIP) 1 MG TABLET    Take 2 tablets (2 mg total) by mouth every evening.    TRAMADOL (ULTRAM) 50 MG TABLET    TAKE 2 TABLETS BY MOUTH EVERY TWELVE HOURS AS NEEDED FOR PAIN    VENLAFAXINE (EFFEXOR-XR) 150 MG CP24    Take 1 capsule (150 mg total) by mouth once daily.    VITAMIN D (VITAMIN D3) 1000 UNITS TAB    Take 1,000 Units by mouth once daily.    VITAMIN E 400 UNIT CAPSULE    Take 400 Units by mouth 2 (two) times daily.   Discontinued Medications    TOLTERODINE (DETROL LA) 4 MG 24 HR CAPSULE    Take 1 capsule (4 mg total) by mouth once daily.         Vital Signs:  BP (!) 147/73   Pulse 84   Ht 5' 3" (1.6 m)   Wt 55.3 kg (122 lb)   SpO2 97%   BMI 21.61 kg/m²         Physical Exam  Vitals reviewed.   Constitutional:       General: She is awake. She is not in acute distress.     Appearance: Normal appearance. She is well-developed. She is not ill-appearing, toxic-appearing or diaphoretic.   HENT:      Head: Normocephalic and atraumatic.      Nose: Nose normal.      Mouth/Throat:      Mouth: Mucous membranes are moist.      Pharynx: Oropharynx is clear. No oropharyngeal exudate or posterior oropharyngeal erythema.   Eyes:      General: Lids are " normal. Gaze aligned appropriately. No scleral icterus.        Right eye: No discharge.         Left eye: No discharge.      Conjunctiva/sclera: Conjunctivae normal.   Neck:      Trachea: Trachea normal.   Cardiovascular:      Rate and Rhythm: Normal rate and regular rhythm.      Pulses:           Radial pulses are 2+ on the right side and 2+ on the left side.   Pulmonary:      Effort: Pulmonary effort is normal. No respiratory distress.      Breath sounds: No stridor. No wheezing.   Chest:      Chest wall: No tenderness.   Abdominal:      General: Bowel sounds are normal. There is no distension.      Palpations: Abdomen is soft. There is no fluid wave, hepatomegaly or mass.      Tenderness: There is abdominal tenderness in the right lower quadrant and epigastric area. There is no guarding or rebound.      Comments: Mild tenderness to deep palpation   Musculoskeletal:         General: No tenderness or deformity.      Cervical back: Full passive range of motion without pain and neck supple. No tenderness.      Right lower leg: No edema.      Left lower leg: No edema.   Lymphadenopathy:      Cervical: No cervical adenopathy.   Skin:     General: Skin is warm and dry.      Capillary Refill: Capillary refill takes less than 2 seconds.      Coloration: Skin is not cyanotic, jaundiced or pale.   Neurological:      General: No focal deficit present.      Mental Status: She is alert and oriented to person, place, and time.      Motor: No tremor.   Psychiatric:         Attention and Perception: Attention normal.         Mood and Affect: Mood and affect normal.         Speech: Speech normal.         Behavior: Behavior normal. Behavior is cooperative.            All of the data above and below has been reviewed by myself and any further interpretations will be reflected in the assessment and plan.   The data includes review of external notes, and independent interpretation of lab results, procedures, x-rays, and imaging  reports.      Assessment:  Iron deficiency anemia, unspecified iron deficiency anemia type  -     Ferritin; Future; Expected date: 08/01/2023  -     Folate; Future; Expected date: 08/01/2023  -     Iron, TIBC, and %Saturation; Future; Expected date: 08/01/2023  -     Lactate Dehydrogenase; Future; Expected date: 08/01/2023  -     Reticulocytes; Future; Expected date: 08/01/2023  -     Transferrin; Future; Expected date: 08/01/2023  -     Vitamin B12; Future; Expected date: 08/01/2023  -     CBC Auto Differential; Future; Expected date: 08/01/2023  -     Haptoglobin; Future; Expected date: 08/01/2023  -     Comprehensive Metabolic Panel; Future; Expected date: 08/01/2023    Gastric intestinal metaplasia    Abdominal pain, epigastric  -     CT Abdomen Pelvis  Without Contrast; Future; Expected date: 08/08/2023    Right lower quadrant pain  -     CT Abdomen Pelvis  Without Contrast; Future; Expected date: 08/08/2023    Gastroesophageal reflux disease, unspecified whether esophagitis present    Chronic diarrhea    History of colon polyps    Rapid fatigue of gait  -     Folate; Future; Expected date: 08/01/2023  -     Vitamin B12; Future; Expected date: 08/01/2023    Nausea  Comments:  trial of ondansetron  Orders:  -     ondansetron (ZOFRAN-ODT) 4 MG TbDL; Take 1 tablet (4 mg total) by mouth every 6 (six) hours as needed (nausea).  Dispense: 20 tablet; Refill: 0    No clear GI source of blood loss.   EGD and CE unrevealing.   Last colonoscopy 2021.   Ddx AVM v arterial erosion v inflammation v ischemia. Imaging limited by CKD.  Will review [email or web address] for prior cross-sectional imaging.  According to her daughter who was with her during the hospitalization there was no CT scan.  We will order without IV contrast.  We will order it with oral contrast.  Her last coronary stent was 2017.  Her last peripheral stent was in 2018.  For now she is off the Plavix.  We will plan to repeat blood work in about 1 month.  By that time we  should hopefully have her CT report as well.       Recommendations:  Submit blood samples with my orders and Dr. Denise's orders.  Schedule CT scan.    Please notify my office if you have not been contacted within two weeks after any procedures, submitting any samples (biopsies, blood, stool, urine, etc.) or after any imaging (X-ray, CT, MRI, etc.).     Risks, benefits, and alternatives of medical management, any associated procedures, and/or treatment discussed with the patient. Patient given opportunity to ask questions and voices understanding. Patient has elected to proceed with the recommended care modalities as discussed.    Instructed patient to notify my office if they have not been contacted within two weeks after any procedures, submitting any samples (biopsies, blood, stool, urine, etc.) or after any imaging (X-ray, CT, MRI, etc.).     Follow up in about 6 months (around 2/1/2024).    Order summary:  Orders Placed This Encounter   Procedures    CT Abdomen Pelvis  Without Contrast    Ferritin    Folate    Iron, TIBC, and %Saturation    Lactate Dehydrogenase    Reticulocytes    Transferrin    Vitamin B12    CBC Auto Differential    Haptoglobin    Comprehensive Metabolic Panel      This assessment, plan, and documentation was performed in collaboration with Li Santiago NP.      This document may have been created using a voice recognition transcribing system. Incorrect words or phrases may have been missed during proofreading. Please interpret accordingly or contact me for clarification.     Yvette Morgan MD

## 2023-08-01 NOTE — PATIENT INSTRUCTIONS
Submit blood samples with my orders and Dr. Denise's orders.  Schedule CT scan.    Please notify my office if you have not been contacted within two weeks after any procedures, submitting any samples (biopsies, blood, stool, urine, etc.) or after any imaging (X-ray, CT, MRI, etc.).

## 2023-08-04 ENCOUNTER — OFFICE VISIT (OUTPATIENT)
Dept: FAMILY MEDICINE | Facility: CLINIC | Age: 88
End: 2023-08-04
Payer: MEDICARE

## 2023-08-04 VITALS
BODY MASS INDEX: 20.98 KG/M2 | OXYGEN SATURATION: 98 % | WEIGHT: 118.38 LBS | SYSTOLIC BLOOD PRESSURE: 136 MMHG | TEMPERATURE: 98 F | HEIGHT: 63 IN | DIASTOLIC BLOOD PRESSURE: 68 MMHG | HEART RATE: 76 BPM

## 2023-08-04 DIAGNOSIS — R11.0 NAUSEA: ICD-10-CM

## 2023-08-04 DIAGNOSIS — N18.4 CHRONIC KIDNEY DISEASE, STAGE IV (SEVERE): Primary | ICD-10-CM

## 2023-08-04 DIAGNOSIS — R10.9 ABDOMINAL PAIN, RIGHT LATERAL: ICD-10-CM

## 2023-08-04 DIAGNOSIS — D50.0 IRON DEFICIENCY ANEMIA DUE TO CHRONIC BLOOD LOSS: ICD-10-CM

## 2023-08-04 PROBLEM — R94.6 THYROID FUNCTION TEST ABNORMAL: Status: ACTIVE | Noted: 2023-08-04

## 2023-08-04 PROBLEM — I05.0 MITRAL VALVE STENOSIS: Status: ACTIVE | Noted: 2023-08-04

## 2023-08-04 PROBLEM — Z90.13 HISTORY OF BILATERAL MASTECTOMY: Status: ACTIVE | Noted: 2023-08-04

## 2023-08-04 PROBLEM — F33.9 RECURRENT MAJOR DEPRESSIVE DISORDER: Status: ACTIVE | Noted: 2023-08-04

## 2023-08-04 PROBLEM — E78.00 PURE HYPERCHOLESTEROLEMIA: Status: ACTIVE | Noted: 2023-08-04

## 2023-08-04 PROBLEM — I13.10 HYPERTENSIVE HEART AND KIDNEY DISEASE: Status: ACTIVE | Noted: 2023-08-04

## 2023-08-04 PROBLEM — M85.80 OSTEOPENIA: Status: ACTIVE | Noted: 2023-08-04

## 2023-08-04 PROBLEM — I25.10 ARTERIOSCLEROSIS OF CORONARY ARTERY: Status: ACTIVE | Noted: 2023-08-04

## 2023-08-04 PROBLEM — M19.90 OSTEOARTHRITIS: Status: ACTIVE | Noted: 2023-08-04

## 2023-08-04 PROBLEM — D63.8 ANEMIA OF CHRONIC DISEASE: Status: ACTIVE | Noted: 2023-08-04

## 2023-08-04 LAB
ALBUMIN SERPL-MCNC: 4.8 G/DL (ref 3.4–5)
ALBUMIN/GLOB SERPL: 1.6 RATIO
ALP SERPL-CCNC: 26 UNIT/L (ref 50–144)
ALT SERPL-CCNC: 16 UNIT/L (ref 1–45)
ANION GAP SERPL CALC-SCNC: 12 MEQ/L (ref 2–13)
AST SERPL-CCNC: 30 UNIT/L (ref 14–36)
BASOPHILS # BLD AUTO: 0.07 X10(3)/MCL (ref 0.01–0.08)
BASOPHILS NFR BLD AUTO: 1 % (ref 0.1–1.2)
BILIRUBIN DIRECT+TOT PNL SERPL-MCNC: 0.5 MG/DL (ref 0–1)
BUN SERPL-MCNC: 45 MG/DL (ref 7–20)
CALCIUM SERPL-MCNC: 11 MG/DL (ref 8.4–10.2)
CHLORIDE SERPL-SCNC: 100 MMOL/L (ref 98–110)
CO2 SERPL-SCNC: 30 MMOL/L (ref 21–32)
CREAT SERPL-MCNC: 1.72 MG/DL (ref 0.66–1.25)
CREAT/UREA NIT SERPL: 26 (ref 12–20)
EOSINOPHIL # BLD AUTO: 0.18 X10(3)/MCL (ref 0.04–0.36)
EOSINOPHIL NFR BLD AUTO: 2.7 % (ref 0.7–7)
ERYTHROCYTE [DISTWIDTH] IN BLOOD BY AUTOMATED COUNT: 14.4 % (ref 11–14.5)
GFR SERPLBLD CREATININE-BSD FMLA CKD-EPI: 29 MLS/MIN/1.73/M2
GLOBULIN SER-MCNC: 3 GM/DL (ref 2–3.9)
GLUCOSE SERPL-MCNC: 125 MG/DL (ref 70–115)
HCT VFR BLD AUTO: 33.6 % (ref 36–48)
HGB BLD-MCNC: 10.6 G/DL (ref 11.8–16)
IMM GRANULOCYTES # BLD AUTO: 0.02 X10(3)/MCL (ref 0–0.03)
IMM GRANULOCYTES NFR BLD AUTO: 0.3 % (ref 0–0.5)
LYMPHOCYTES # BLD AUTO: 1.79 X10(3)/MCL (ref 1.16–3.74)
LYMPHOCYTES NFR BLD AUTO: 26.8 % (ref 20–55)
MCH RBC QN AUTO: 29 PG (ref 27–34)
MCHC RBC AUTO-ENTMCNC: 31.5 G/DL (ref 31–37)
MCV RBC AUTO: 92.1 FL (ref 79–99)
MONOCYTES # BLD AUTO: 0.44 X10(3)/MCL (ref 0.24–0.36)
MONOCYTES NFR BLD AUTO: 6.6 % (ref 4.7–12.5)
NEUTROPHILS # BLD AUTO: 4.19 X10(3)/MCL (ref 1.56–6.13)
NEUTROPHILS NFR BLD AUTO: 62.6 % (ref 37–73)
NRBC BLD AUTO-RTO: 0 %
PLATELET # BLD AUTO: 332 X10(3)/MCL (ref 140–371)
PMV BLD AUTO: 10.2 FL (ref 9.4–12.4)
POTASSIUM SERPL-SCNC: 4.6 MMOL/L (ref 3.5–5.1)
PROT SERPL-MCNC: 7.8 GM/DL (ref 6.3–8.2)
RBC # BLD AUTO: 3.65 X10(6)/MCL (ref 4–5.1)
SODIUM SERPL-SCNC: 142 MMOL/L (ref 135–145)
WBC # SPEC AUTO: 6.69 X10(3)/MCL (ref 4–11.5)

## 2023-08-04 PROCEDURE — 80053 COMPREHEN METABOLIC PANEL: CPT | Performed by: FAMILY MEDICINE

## 2023-08-04 PROCEDURE — 99214 PR OFFICE/OUTPT VISIT, EST, LEVL IV, 30-39 MIN: ICD-10-PCS | Mod: ,,, | Performed by: FAMILY MEDICINE

## 2023-08-04 PROCEDURE — 99214 OFFICE O/P EST MOD 30 MIN: CPT | Mod: ,,, | Performed by: FAMILY MEDICINE

## 2023-08-04 PROCEDURE — 85025 COMPLETE CBC W/AUTO DIFF WBC: CPT | Performed by: FAMILY MEDICINE

## 2023-08-04 RX ORDER — FLUOCINOLONE ACETONIDE 0.1 MG/ML
1 SOLUTION TOPICAL
COMMUNITY
Start: 2023-08-01 | End: 2023-09-19

## 2023-08-04 RX ORDER — KETOCONAZOLE 20 MG/ML
1 SHAMPOO, SUSPENSION TOPICAL
COMMUNITY
Start: 2023-08-01 | End: 2023-09-19

## 2023-08-04 NOTE — ASSESSMENT & PLAN NOTE
Check renal function today to make sure that she is not have any worsening renal function contributing to her nausea.

## 2023-08-04 NOTE — PROGRESS NOTES
"SUBJECTIVE:  HPI    Leatha Adames is a 88 y.o. female here for Nausea.  She has greater than one-week history of persistent nausea.  However, she has been able to eat without any vomiting.  She is having normal bowel movements.  She does note that she is having some right-sided abdominal pain.  She recently saw her gastroenterologist to follow-up her capsule endoscopy, which was unremarkable, and she was prescribed some Zofran for her symptoms and a CT scan of the abdomen was ordered.  She had side effects of severe dizziness after taking the Zofran.  Her CT scan has not yet been scheduled.  She denies any fever or chills.    Curts allergies, medications, history, and problem list were updated as appropriate.    ROS:  Pertinent ROS as above, otherwise negative    OBJECTIVE:  Vital signs  Visit Vitals  /68 (BP Location: Left arm)   Pulse 76   Temp 97.9 °F (36.6 °C) (Temporal)   Ht 5' 3" (1.6 m)   Wt 53.7 kg (118 lb 6.4 oz)   SpO2 98%   BMI 20.97 kg/m²          PHYSICAL EXAM:  General:  Awake, alert, no acute distress   Eyes:  Pupils equal, round, reactive to light.  Conjunctiva normal bilaterally.  Cardiovascular: Regular rate and rhythm.  2/6 systolic murmur.  Respiratory: Clear to auscultation bilaterally, normal effort  Abdomen: Soft, right-sided abdominal tenderness without rebound, guarding or masses  Extremities:  No peripheral edema, no cyanosis  Skin: No rashes      ASSESSMENT/PLAN:  1. Chronic kidney disease, stage IV (severe)  Overview:  Lab Results   Component Value Date    CREATININE 2.03 (H) 06/08/2023    EGFRNONAA 30 03/10/2022    EGFRNONAA 38 04/23/2021       Assessment & Plan:  Check renal function today to make sure that she is not have any worsening renal function contributing to her nausea.    Orders:  -     Comprehensive Metabolic Panel; Future; Expected date: 08/04/2023  -     CBC Auto Differential; Future; Expected date: 08/04/2023    2. Nausea  Assessment & Plan:  No medication at this " time due to comorbidities and other medications.  She is able to eat and drink and maintain hydration.  We will see what the CT scan reveals and the lab work shows.    Orders:  -     Comprehensive Metabolic Panel; Future; Expected date: 08/04/2023  -     CBC Auto Differential; Future; Expected date: 08/04/2023    3. Abdominal pain, right lateral  Assessment & Plan:  No medication at this time due to comorbidities and other medications.  She is able to eat and drink and maintain hydration.  We will see what the CT scan reveals and the lab work shows.      4. 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 loss  Overview:  Lab Results   Component Value Date    WBC 6.12 07/17/2023    HGB 9.8 (L) 07/25/2023    HCT 31.1 (L) 07/25/2023    MCV 92.4 07/17/2023     07/17/2023       Lab Results   Component Value Date    UIBC 325 (H) 07/17/2023    IRON 78 07/17/2023    TRANS 371 07/17/2023    TIBC 403 07/17/2023    LABIRON 19 (L) 07/17/2023          Assessment & Plan:  Check CBC today to assure stability of hemoglobin    Orders:  -     Comprehensive Metabolic Panel; Future; Expected date: 08/04/2023  -     CBC Auto Differential; Future; Expected date: 08/04/2023

## 2023-08-04 NOTE — ASSESSMENT & PLAN NOTE
No medication at this time due to comorbidities and other medications.  She is able to eat and drink and maintain hydration.  We will see what the CT scan reveals and the lab work shows.

## 2023-08-07 ENCOUNTER — TELEPHONE (OUTPATIENT)
Dept: FAMILY MEDICINE | Facility: CLINIC | Age: 88
End: 2023-08-07
Payer: MEDICARE

## 2023-08-07 NOTE — TELEPHONE ENCOUNTER
----- Message from Dennys Linares MD sent at 8/7/2023  2:31 PM CDT -----  Blood count and kidney function is improved from the last time it was checked.  Has she been able to get her CT scan scheduled?

## 2023-08-09 ENCOUNTER — TELEPHONE (OUTPATIENT)
Dept: GASTROENTEROLOGY | Facility: CLINIC | Age: 88
End: 2023-08-09
Payer: MEDICARE

## 2023-08-09 NOTE — TELEPHONE ENCOUNTER
I spoke with pt's daughter and gave information. Pt does not have a gyn to follow up with. Do you have any recommendations?

## 2023-08-09 NOTE — TELEPHONE ENCOUNTER
CT AP w/o: tree-in-bud RML, smHH, 1.7cm left pelvis ST density, liver unremarkable in body of report, nodular in impression.    Notify patient that her CT of the abdomen overall looks well.  No signs of inflammation of the GI tract. Her liver shows signs of potential scar tissue that we can review at our next OV.  She also had a small pelvis lesion.  Does she have a gynecologist or someone to evaluate her for a pelvic ultrasound?  I saw her blood results from last week overall stable/well.     PHCP Cc'd as SIGIFREDO.  NBP

## 2023-08-10 ENCOUNTER — OFFICE VISIT (OUTPATIENT)
Dept: FAMILY MEDICINE | Facility: CLINIC | Age: 88
End: 2023-08-10
Payer: MEDICARE

## 2023-08-10 VITALS
OXYGEN SATURATION: 97 % | DIASTOLIC BLOOD PRESSURE: 62 MMHG | TEMPERATURE: 98 F | WEIGHT: 116 LBS | HEART RATE: 87 BPM | SYSTOLIC BLOOD PRESSURE: 134 MMHG | BODY MASS INDEX: 20.55 KG/M2 | HEIGHT: 63 IN

## 2023-08-10 DIAGNOSIS — R30.0 DYSURIA: Primary | ICD-10-CM

## 2023-08-10 DIAGNOSIS — N30.00 ACUTE CYSTITIS WITHOUT HEMATURIA: ICD-10-CM

## 2023-08-10 DIAGNOSIS — R19.00 PELVIC MASS: ICD-10-CM

## 2023-08-10 LAB
BILIRUB UR QL STRIP: NEGATIVE
GLUCOSE UR QL STRIP: NEGATIVE
KETONES UR QL STRIP: NEGATIVE
LEUKOCYTE ESTERASE UR QL STRIP: POSITIVE
PH, POC UA: 5.5
POC BLOOD, URINE: POSITIVE
POC NITRATES, URINE: NEGATIVE
PROT UR QL STRIP: POSITIVE
SP GR UR STRIP: 1.01 (ref 1–1.03)
UROBILINOGEN UR STRIP-ACNC: 0.2 (ref 0.1–1.1)

## 2023-08-10 PROCEDURE — 87088 URINE BACTERIA CULTURE: CPT | Performed by: FAMILY MEDICINE

## 2023-08-10 PROCEDURE — 99214 OFFICE O/P EST MOD 30 MIN: CPT | Mod: ,,, | Performed by: FAMILY MEDICINE

## 2023-08-10 PROCEDURE — 99214 PR OFFICE/OUTPT VISIT, EST, LEVL IV, 30-39 MIN: ICD-10-PCS | Mod: ,,, | Performed by: FAMILY MEDICINE

## 2023-08-10 PROCEDURE — 81003 URINALYSIS AUTO W/O SCOPE: CPT | Mod: QW,RHCUB | Performed by: FAMILY MEDICINE

## 2023-08-10 RX ORDER — CEFDINIR 300 MG/1
300 CAPSULE ORAL 2 TIMES DAILY
Qty: 14 CAPSULE | Refills: 0 | Status: SHIPPED | OUTPATIENT
Start: 2023-08-10 | End: 2023-08-15 | Stop reason: ALTCHOICE

## 2023-08-10 NOTE — PROGRESS NOTES
"SUBJECTIVE:  HPI    Leatha Adames is a 88 y.o. female here for Dysuria, Fatigue, and Altered Mental Status.     He reports dysuria, urinary frequency, fatigue and some increasing confusion at night for the last several days.    Also, she recently had a CT scan of the abdomen ordered by her gastroenterologist we showed a pelvic mass.  An ultrasound of the pelvis was recommended.    Leatha's allergies, medications, history, and problem list were updated as appropriate.    ROS:  Pertinent ROS as above, otherwise negative    OBJECTIVE:  Vital signs  Visit Vitals  /62 (BP Location: Right arm, Patient Position: Sitting)   Pulse 87   Temp 98.1 °F (36.7 °C) (Temporal)   Ht 5' 2.99" (1.6 m)   Wt 52.6 kg (116 lb)   SpO2 97%   BMI 20.55 kg/m²          PHYSICAL EXAM:  General: Awake, alert, no acute distress generally weak appearing, thin  Cardiovascular: Regular rate and rhythm with a 3/6 systolic murmur  Respiratory: Clear to auscultation bilaterally, normal effort    Chemistry:  Lab Results   Component Value Date     08/04/2023    K 4.6 08/04/2023    BUN 45.0 (H) 08/04/2023    CREATININE 1.72 (H) 08/04/2023    EGFRNORACEVR 29 08/04/2023    GLUCOSE 125 (H) 08/04/2023    CALCIUM 11.0 (H) 08/04/2023    ALKPHOS 26 (L) 08/04/2023    AST 30 08/04/2023    ALT 16 08/04/2023    TSH 3.450 06/08/2023    DEOYLR8WNNO 0.72 03/10/2022        Lab Results   Component Value Date    HGBA1C 5.8 07/17/2023        Hematology:  Lab Results   Component Value Date    WBC 6.69 08/04/2023    HGB 10.6 (L) 08/04/2023    MCV 92.1 08/04/2023     08/04/2023         ASSESSMENT/PLAN:  1. Dysuria  -     POCT Urinalysis, Dipstick, Automated, W/O Scope  -     Urine culture; Future; Expected date: 08/10/2023    2. Acute cystitis without hematuria  Assessment & Plan:  Urine culture    Cefdinir x7 days    Orders:  -     cefdinir (OMNICEF) 300 MG capsule; Take 1 capsule (300 mg total) by mouth 2 (two) times daily. for 7 days  Dispense: 14 capsule; " Refill: 0    3. Pelvic mass  Assessment & Plan:  Check pelvic ultrasound and compare to CT findings    Orders:  -     US Transvaginal Non OB; Future; Expected date: 08/10/2023

## 2023-08-13 LAB — BACTERIA UR CULT: NO GROWTH

## 2023-08-13 NOTE — TELEPHONE ENCOUNTER
I do not have one in particular. I see Dr. Linares ordered a pelvic ultrasound. I will defer the rest of the pelvic lesion management to him.  NBP

## 2023-08-15 ENCOUNTER — OFFICE VISIT (OUTPATIENT)
Dept: FAMILY MEDICINE | Facility: CLINIC | Age: 88
End: 2023-08-15
Payer: MEDICARE

## 2023-08-15 VITALS
HEART RATE: 75 BPM | WEIGHT: 119.63 LBS | BODY MASS INDEX: 21.2 KG/M2 | TEMPERATURE: 99 F | DIASTOLIC BLOOD PRESSURE: 54 MMHG | SYSTOLIC BLOOD PRESSURE: 138 MMHG | OXYGEN SATURATION: 97 % | HEIGHT: 63 IN

## 2023-08-15 DIAGNOSIS — R20.2 PARESTHESIA OF LEFT UPPER AND LOWER EXTREMITY: ICD-10-CM
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
## 2023-08-15 DIAGNOSIS — R53.1 GENERALIZED WEAKNESS: ICD-10-CM

## 2023-08-15 PROCEDURE — 99214 OFFICE O/P EST MOD 30 MIN: CPT | Mod: ,,, | Performed by: FAMILY MEDICINE

## 2023-08-15 PROCEDURE — 99214 PR OFFICE/OUTPT VISIT, EST, LEVL IV, 30-39 MIN: ICD-10-PCS | Mod: ,,, | Performed by: FAMILY MEDICINE

## 2023-08-15 NOTE — PROGRESS NOTES
"SUBJECTIVE:  HPI    Leatha Adames is a 88 y.o. female here for Fatigue and Fall.  She fell in the middle of the night 2 nights ago.  She has some mild pain to the left hip but is able to bear weight without any significant worsening of pain.  She also sustained an abrasion to the left elbow.  She denies any hitting her head or loss of consciousness.  He has associated fatigue.      Leatha's allergies, medications, history, and problem list were updated as appropriate.    ROS:  Pertinent ROS as above, otherwise negative    OBJECTIVE:  Vital signs  Visit Vitals  BP (!) 138/54 (BP Location: Right arm, Patient Position: Sitting)   Pulse 75   Temp 98.6 °F (37 °C) (Temporal)   Ht 5' 2.99" (1.6 m)   Wt 54.3 kg (119 lb 9.6 oz)   SpO2 97%   BMI 21.19 kg/m²          PHYSICAL EXAM:  General: Awake, alert, no acute distress  Cardiovascular:  Regular rhythm.  Normal rate. 2/6 systolic murmur Respiratory: Clear to auscultation bilaterally, normal effort  Extremities:  No cyanosis, no clubbing, no edema  Skin:  No rashes or appreciable lesions   Neuro:  Cranial nerves 2-12 are intact with usual testing.  Gait is not assessed.  Romberg is negative.  Moves all 4 extremities equally and symmetrically.  Psychiatric:  Slightly sedated appearing.  Normal mood and affect.    ASSESSMENT/PLAN:  1. Fall, initial encounter    2. Generalized weakness    3. Paresthesia of left upper and lower extremity  -     Ambulatory referral/consult to Neurology; Future; Expected date: 08/22/2023    We will stop her ropinirole and clonazepam.  If she needs something to help her sleep or to help with anxiety, change to alprazolam.    Check EMG nerve conduction study to assess paresthesias.  I suspect that she has diabetic neuropathy.      Return to clinic as scheduled in September, sooner if needed.  "

## 2023-08-16 ENCOUNTER — PATIENT MESSAGE (OUTPATIENT)
Dept: FAMILY MEDICINE | Facility: CLINIC | Age: 88
End: 2023-08-16
Payer: MEDICARE

## 2023-08-18 ENCOUNTER — HOSPITAL ENCOUNTER (OUTPATIENT)
Dept: RADIOLOGY | Facility: HOSPITAL | Age: 88
Discharge: HOME | End: 2023-08-18
Attending: FAMILY MEDICINE
Payer: MEDICARE

## 2023-08-18 ENCOUNTER — TELEPHONE (OUTPATIENT)
Dept: FAMILY MEDICINE | Facility: CLINIC | Age: 88
End: 2023-08-18
Payer: MEDICARE

## 2023-08-18 DIAGNOSIS — R19.00 PELVIC MASS: ICD-10-CM

## 2023-08-18 PROCEDURE — 76830 TRANSVAGINAL US NON-OB: CPT | Mod: TC

## 2023-08-18 NOTE — TELEPHONE ENCOUNTER
----- Message from Dennys Linares MD sent at 8/18/2023 11:37 AM CDT -----  Ultrasound was okay.  The spot seen on CT scan was not present on ultrasound.  It was likely a shadow.   PCP: Alyssa Dill MD    Last appt: 11/23/2020  No future appointments. Requested Prescriptions     Pending Prescriptions Disp Refills    metoprolol succinate (TOPROL-XL) 50 mg XL tablet 30 Tab 1     Sig: Take 1 Tab by mouth daily.          Other Comments:  30 day fill

## 2023-08-22 ENCOUNTER — INFUSION (OUTPATIENT)
Dept: INFUSION THERAPY | Facility: HOSPITAL | Age: 88
End: 2023-08-22
Attending: INTERNAL MEDICINE
Payer: MEDICARE

## 2023-08-22 VITALS
SYSTOLIC BLOOD PRESSURE: 148 MMHG | RESPIRATION RATE: 18 BRPM | OXYGEN SATURATION: 98 % | HEART RATE: 73 BPM | TEMPERATURE: 99 F | DIASTOLIC BLOOD PRESSURE: 54 MMHG

## 2023-08-22 DIAGNOSIS — D63.1 ANEMIA DUE TO STAGE 4 CHRONIC KIDNEY DISEASE TREATED WITH ERYTHROPOIETIN: Primary | ICD-10-CM

## 2023-08-22 DIAGNOSIS — N18.4 ANEMIA DUE TO STAGE 4 CHRONIC KIDNEY DISEASE TREATED WITH ERYTHROPOIETIN: Primary | ICD-10-CM

## 2023-08-22 PROCEDURE — 96372 THER/PROPH/DIAG INJ SC/IM: CPT

## 2023-08-22 PROCEDURE — 63600175 PHARM REV CODE 636 W HCPCS: Mod: JZ,EC,JG

## 2023-08-22 RX ADMIN — EPOETIN ALFA-EPBX 20000 UNITS: 10000 INJECTION, SOLUTION INTRAVENOUS; SUBCUTANEOUS at 09:08

## 2023-08-22 NOTE — PLAN OF CARE
Injection given in right and left lower abdomen, Patient tolerated it well, No s/s of distress noted, Instructed patient to follow up with her physician as needed, Verbalized understanding, Discharged home using a rolling walker in stable condition

## 2023-08-31 PROBLEM — E11.42 DIABETIC POLYNEUROPATHY ASSOCIATED WITH TYPE 2 DIABETES MELLITUS: Status: ACTIVE | Noted: 2023-08-31

## 2023-09-02 DIAGNOSIS — E78.00 PURE HYPERCHOLESTEROLEMIA: Primary | ICD-10-CM

## 2023-09-05 RX ORDER — ATORVASTATIN CALCIUM 40 MG/1
40 TABLET, FILM COATED ORAL
Qty: 90 TABLET | Refills: 3 | Status: SHIPPED | OUTPATIENT
Start: 2023-09-05

## 2023-09-10 DIAGNOSIS — F33.41 RECURRENT MAJOR DEPRESSIVE DISORDER, IN PARTIAL REMISSION: Primary | ICD-10-CM

## 2023-09-11 PROCEDURE — 83540 ASSAY OF IRON: CPT | Performed by: FAMILY MEDICINE

## 2023-09-11 PROCEDURE — 80053 COMPREHEN METABOLIC PANEL: CPT | Performed by: FAMILY MEDICINE

## 2023-09-11 PROCEDURE — 83036 HEMOGLOBIN GLYCOSYLATED A1C: CPT | Performed by: FAMILY MEDICINE

## 2023-09-11 PROCEDURE — 80061 LIPID PANEL: CPT | Performed by: FAMILY MEDICINE

## 2023-09-11 PROCEDURE — 82728 ASSAY OF FERRITIN: CPT | Performed by: FAMILY MEDICINE

## 2023-09-11 PROCEDURE — 85025 COMPLETE CBC W/AUTO DIFF WBC: CPT | Performed by: FAMILY MEDICINE

## 2023-09-11 RX ORDER — CLONAZEPAM 1 MG/1
1 TABLET ORAL NIGHTLY
Qty: 90 TABLET | Refills: 1 | Status: SHIPPED | OUTPATIENT
Start: 2023-09-11 | End: 2024-01-02 | Stop reason: ALTCHOICE

## 2023-09-19 ENCOUNTER — OFFICE VISIT (OUTPATIENT)
Dept: FAMILY MEDICINE | Facility: CLINIC | Age: 88
End: 2023-09-19
Payer: MEDICARE

## 2023-09-19 VITALS
TEMPERATURE: 99 F | HEART RATE: 85 BPM | WEIGHT: 116.81 LBS | BODY MASS INDEX: 20.7 KG/M2 | HEIGHT: 63 IN | DIASTOLIC BLOOD PRESSURE: 70 MMHG | SYSTOLIC BLOOD PRESSURE: 136 MMHG | OXYGEN SATURATION: 98 %

## 2023-09-19 DIAGNOSIS — N18.4 CHRONIC KIDNEY DISEASE, STAGE IV (SEVERE): ICD-10-CM

## 2023-09-19 DIAGNOSIS — E11.59 TYPE 2 DIABETES MELLITUS WITH OTHER CIRCULATORY COMPLICATIONS: Primary | ICD-10-CM

## 2023-09-19 DIAGNOSIS — E78.00 PURE HYPERCHOLESTEROLEMIA: ICD-10-CM

## 2023-09-19 DIAGNOSIS — D50.0 IRON DEFICIENCY ANEMIA DUE TO CHRONIC BLOOD LOSS: ICD-10-CM

## 2023-09-19 DIAGNOSIS — D63.1 ANEMIA DUE TO STAGE 4 CHRONIC KIDNEY DISEASE TREATED WITH ERYTHROPOIETIN: ICD-10-CM

## 2023-09-19 DIAGNOSIS — N18.4 ANEMIA DUE TO STAGE 4 CHRONIC KIDNEY DISEASE TREATED WITH ERYTHROPOIETIN: ICD-10-CM

## 2023-09-19 PROCEDURE — 99214 PR OFFICE/OUTPT VISIT, EST, LEVL IV, 30-39 MIN: ICD-10-PCS | Mod: ,,, | Performed by: FAMILY MEDICINE

## 2023-09-19 PROCEDURE — 99214 OFFICE O/P EST MOD 30 MIN: CPT | Mod: ,,, | Performed by: FAMILY MEDICINE

## 2023-09-19 RX ORDER — ERYTHROMYCIN 5 MG/G
OINTMENT OPHTHALMIC NIGHTLY
COMMUNITY
Start: 2023-09-06

## 2023-09-19 NOTE — ASSESSMENT & PLAN NOTE
On atorvastatin 40 mg daily and fenofibrate 160 mg daily with LDL at goal of less than 70 and triglycerides near goal of less than 150

## 2023-09-19 NOTE — PROGRESS NOTES
"SUBJECTIVE:  HPI    Leatha Adames is a 88 y.o. female here for Follow-up (Labs) on chronic health conditions.  She is doing well.  She continues regular follow-up with Nephrology.  She has been getting iron infusions and erythropoietin injections.      She denies any current chest pain, abdominal pain, shortness of breath.  She denies any hypoglycemia.      Leatha's allergies, medications, history, and problem list were updated as appropriate.    ROS:  Pertinent ROS as above, otherwise negative    OBJECTIVE:  Vital signs  Visit Vitals  /70 (BP Location: Left arm, Patient Position: Sitting)   Pulse 85   Temp 98.7 °F (37.1 °C) (Temporal)   Ht 5' 2.99" (1.6 m)   Wt 53 kg (116 lb 12.8 oz)   SpO2 98%   BMI 20.70 kg/m²          PHYSICAL EXAM:  General:  Awake, alert, no acute distress, thin, weight stable   Eyes:  Pupils equal, round, reactive to light.  Conjunctiva normal bilaterally.  Cardiovascular: Regular rate and rhythm.  2/6 systolic murmur  Respiratory: Clear to auscultation bilaterally, normal effort  Skin: No rashes    Chemistry:  Lab Results   Component Value Date     09/11/2023    K 4.6 09/11/2023    BUN 39.0 (H) 09/11/2023    CREATININE 1.74 (H) 09/11/2023    EGFRNORACEVR 28 09/11/2023    GLUCOSE 129 (H) 09/11/2023    CALCIUM 9.9 09/11/2023    ALKPHOS 27 (L) 09/11/2023    AST 41 (H) 09/11/2023    ALT 20 09/11/2023    TSH 3.450 06/08/2023    GNSPIM9LXEF 0.72 03/10/2022        Lab Results   Component Value Date    HGBA1C 5.6 09/11/2023        Hematology:  Lab Results   Component Value Date    WBC 6.62 09/11/2023    HGB 11.6 (L) 09/11/2023    MCV 90.6 09/11/2023     09/11/2023       Lipid Panel:  Lab Results   Component Value Date    CHOL 150 09/11/2023    HDL 55 09/11/2023    DLDL 60.6 09/11/2023    TRIG 164 09/11/2023        ASSESSMENT/PLAN:  1. Type 2 diabetes mellitus with other circulatory complications  Overview:  Lab Results   Component Value Date    HGBA1C 5.6 09/11/2023 " "        Assessment & Plan:  Currently off of diabetes treatment     Continue to monitor    Orders:  -     Hemoglobin A1C; Future; Expected date: 12/19/2023    2. Chronic kidney disease, stage IV (severe)  Overview:  Lab Results   Component Value Date    CREATININE 1.74 (H) 09/11/2023    EGFRNONAA 30 03/10/2022    EGFRNONAA 38 04/23/2021       Assessment & Plan:  Stable    On Lasix 20 mg daily, calcitriol 0.5 mcg daily    Followed by Nephrology    Orders:  -     Comprehensive Metabolic Panel; Future; Expected date: 12/19/2023    3. 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 loss  Overview:  Lab Results   Component Value Date    WBC 6.62 09/11/2023    HGB 11.6 (L) 09/11/2023    HCT 35.7 (L) 09/11/2023    MCV 90.6 09/11/2023     09/11/2023       Lab Results   Component Value Date    UIBC 247 09/11/2023    IRON 118 09/11/2023    TRANS 315 09/11/2023    TIBC 365 09/11/2023    LABIRON 32 09/11/2023          Orders:  -     CBC Auto Differential; Future; Expected date: 12/19/2023    4. Anemia due to stage 4 chronic kidney disease treated with erythropoietin  Overview:  Lab Results   Component Value Date    WBC 6.62 09/11/2023    HGB 11.6 (L) 09/11/2023    HCT 35.7 (L) 09/11/2023    MCV 90.6 09/11/2023     09/11/2023             5. Pure hypercholesterolemia  Overview:  Lab Results   Component Value Date    CHOL 150 09/11/2023    CHOL 140 07/17/2023    CHOL 136 06/08/2023     Lab Results   Component Value Date    HDL 55 09/11/2023    HDL 47 07/17/2023    HDL 48 06/08/2023     No results found for: "LDLCALC"  Lab Results   Component Value Date    DLDL 60.6 09/11/2023    DLDL 59.0 07/17/2023    DLDL 57.8 06/08/2023     Lab Results   Component Value Date    TRIG 164 09/11/2023    TRIG 205 (H) 07/17/2023    TRIG 172 06/08/2023       f1 No results found for: "CHOLHDL"      Assessment & Plan:  On atorvastatin 40 mg daily and fenofibrate 160 mg daily with LDL at goal of less than 70 and triglycerides near goal of less " than 150          Follow Up:  Follow up in about 3 months (around 12/19/2023) for Follow-up, Fasting labs.

## 2023-09-25 ENCOUNTER — INFUSION (OUTPATIENT)
Dept: INFUSION THERAPY | Facility: HOSPITAL | Age: 88
End: 2023-09-25
Attending: INTERNAL MEDICINE
Payer: MEDICARE

## 2023-09-25 NOTE — PLAN OF CARE
Hemoglobin is 11.0, Retacrit injection not given today due to Hemoglobin level, Instructed patient and her daughter to follow up with her physician if she needs anything, Verbalized understanding, Patient discharged home in stable condition, No s/s of distress noted,

## 2023-09-26 ENCOUNTER — TELEPHONE (OUTPATIENT)
Dept: FAMILY MEDICINE | Facility: CLINIC | Age: 88
End: 2023-09-26
Payer: MEDICARE

## 2023-10-03 ENCOUNTER — TELEPHONE (OUTPATIENT)
Dept: GASTROENTEROLOGY | Facility: CLINIC | Age: 88
End: 2023-10-03

## 2023-10-03 ENCOUNTER — OFFICE VISIT (OUTPATIENT)
Dept: GASTROENTEROLOGY | Facility: CLINIC | Age: 88
End: 2023-10-03
Payer: MEDICARE

## 2023-10-03 VITALS
WEIGHT: 119 LBS | DIASTOLIC BLOOD PRESSURE: 64 MMHG | HEIGHT: 62 IN | OXYGEN SATURATION: 96 % | BODY MASS INDEX: 21.9 KG/M2 | SYSTOLIC BLOOD PRESSURE: 183 MMHG | HEART RATE: 79 BPM

## 2023-10-03 DIAGNOSIS — K22.2 ESOPHAGEAL STRICTURE: ICD-10-CM

## 2023-10-03 DIAGNOSIS — Z86.010 HISTORY OF COLON POLYPS: ICD-10-CM

## 2023-10-03 DIAGNOSIS — K52.9 CHRONIC DIARRHEA: ICD-10-CM

## 2023-10-03 DIAGNOSIS — R13.10 DYSPHAGIA, UNSPECIFIED TYPE: ICD-10-CM

## 2023-10-03 DIAGNOSIS — D50.9 IRON DEFICIENCY ANEMIA, UNSPECIFIED IRON DEFICIENCY ANEMIA TYPE: ICD-10-CM

## 2023-10-03 DIAGNOSIS — K31.A0 GASTRIC INTESTINAL METAPLASIA: Primary | ICD-10-CM

## 2023-10-03 DIAGNOSIS — K21.9 GASTROESOPHAGEAL REFLUX DISEASE, UNSPECIFIED WHETHER ESOPHAGITIS PRESENT: ICD-10-CM

## 2023-10-03 PROCEDURE — 99215 PR OFFICE/OUTPT VISIT, EST, LEVL V, 40-54 MIN: ICD-10-PCS | Mod: S$GLB,,, | Performed by: INTERNAL MEDICINE

## 2023-10-03 PROCEDURE — 99215 OFFICE O/P EST HI 40 MIN: CPT | Mod: S$GLB,,, | Performed by: INTERNAL MEDICINE

## 2023-10-03 RX ORDER — TOLTERODINE 4 MG/1
4 CAPSULE, EXTENDED RELEASE ORAL DAILY
COMMUNITY
End: 2023-10-09

## 2023-10-03 NOTE — LETTER
October 3, 2023        Dennys Linares MD  [address] [facility]  [address]   [place] [address]             [facility]  [address] [place] [address]  Phone: [phone]  Fax: [phone]   Patient: Leatha Adames   MR Number: [id number]   YOB: 1935   Date of Visit: 10/3/2023       Dear Dr. Linares:    Thank you for referring Leatha Adames to me for evaluation. Attached you will find relevant portions of my assessment and plan of care.    If you have questions, please do not hesitate to call me. I look forward to following Leatha Adames along with you.    Sincerely,      Yvette Morgan MD            CC  No Recipients    Enclosure

## 2023-10-03 NOTE — PROGRESS NOTES
Clinic Note    Reason for visit:  The primary encounter diagnosis was Gastric intestinal metaplasia. Diagnoses of Dysphagia, unspecified type, Iron deficiency anemia, unspecified iron deficiency anemia type, Gastroesophageal reflux disease, unspecified whether esophagitis present, Chronic diarrhea, History of colon polyps, and Esophageal stricture were also pertinent to this visit.    PCP: Dennys Linares       HPI:  This is a 88 y.o. female who is here for a follow up. Daughter present. Patient with hx of esophageal stricture, gastric IM, and chronic diarrhea- on cholestyramine 1 packet as needed. Has not had to take it in a while. She has had dysphagia with solids/pills that is happening more frequently. She had esophageal dilation in 2021 and states this helped with swallowing. She takes pantoprazole 40 mg daily. Weight has been about the same. Denies any blood in stool or dark stools. She is still off Plavix.     Labs 9/25/2023: Hgb 11L, MCV 91.8, CBC onl, Cr 1.69H, CMP onl  9/11/2023: Hgb 11.6L, CBC onl, Iron/ferr wnl    CT AP w/o 8/2023: tree-in-bud RML, smHH, 1.7cm left pelvis ST density, liver unremarkable in body of report, nodular in impression.    7/2023 Capsule endoscopy was normal      EGD 7/18/2023- HH, non-erosive gastritis, GBx chronic inactive gastritis w/o H. pylori     8/2022 CT AP w/o: wnl.     9/15/2021 - Speech Pathology Evaluation - Overall, oral pharyngeal phases wnl, mild-mod pharyngeal residue, mild-mod cervical narrowing. Now on ISMN     EM 9/2021: no chicago classification found     EGD/Colonoscopy 5/14/2021: benign gastroesophageal stricture 13 mm dilated to 15 mm. 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    Review of Systems   Constitutional:  Negative for fatigue, fever and unexpected weight change.   HENT:  Positive for postnasal drip. Negative for mouth sores, sore throat and trouble swallowing.    Eyes:   Negative for pain, discharge and eye dryness.   Respiratory:  Positive for cough. Negative for apnea, choking, chest tightness, shortness of breath and wheezing.    Cardiovascular:  Negative for chest pain, palpitations and leg swelling.   Gastrointestinal:  Positive for reflux. Negative for abdominal distention, abdominal pain, anal bleeding, blood in stool, change in bowel habit, constipation, diarrhea, nausea, rectal pain, vomiting and fecal incontinence.   Genitourinary:  Negative for bladder incontinence, dysuria and hematuria.   Musculoskeletal:  Positive for back pain. Negative for arthralgias and joint swelling.   Integumentary:  Negative for color change and rash.   Allergic/Immunologic: Negative for environmental allergies and food allergies.   Neurological:  Negative for seizures and headaches.   Hematological:  Negative for adenopathy. Does not bruise/bleed easily.        Past Medical History:   Diagnosis Date    Anemia of chronic illness     Anxiety and depression     CAD (coronary artery disease)     Chronic combined systolic and diastolic CHF (congestive heart failure)     CKD (chronic kidney disease) stage 4, GFR 15-29 ml/min     Colon polyp     Dementia     Diabetic polyneuropathy associated with type 2 diabetes mellitus 8/31/2023 August 17, 2023:  EMG nerve conduction study showed generalized mixed sensory-motor polyneuropathy affecting the upper and lower extremities consistent with diabetes.    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     Urge incontinence      Past Surgical History:   Procedure Laterality Date    CARDIAC CATHETERIZATION  07/24/2008     CHOLECYSTECTOMY      COLONOSCOPY      CYSTOCELE REPAIR  2001    EPIDURAL STEROID INJECTION  06/2020    ESOPHAGOGASTRODUODENOSCOPY (EGD) WITH DILATION  05/14/2021    HEMORRHOID SURGERY      HYSTERECTOMY      MASTECTOMY  1995    PERCUTANEOUS TRANSLUMINAL BALLOON ANGIOPLASTY OF CORONARY ARTERY  08/01/2017    PERIPHERAL ARTERIAL STENT GRAFT  09/12/2018     Family History   Problem Relation Age of Onset    Breast cancer Mother     Heart disease Father     Epilepsy Father      Social History     Tobacco Use    Smoking status: Former    Smokeless tobacco: Never   Substance Use Topics    Alcohol use: Not Currently    Drug use: Yes     Types: Other-see comments     Comment: CBD Drops and lotion     Review of patient's allergies indicates:   Allergen Reactions    Ace inhibitors Other (See Comments)    Amoxicillin-pot clavulanate Other (See Comments)    Celecoxib Other (See Comments)    Codeine     Hydrocodone-acetaminophen     Irbesartan Other (See Comments)    Penicillins         Medication List with Changes/Refills   Current Medications    ACETAMINOPHEN (TYLENOL) 500 MG TABLET    Take 1,000 mg by mouth every 8 (eight) hours as needed for Pain.    ASPIRIN 81 MG CHEW    aspirin 81 mg chewable tablet   Chew 1 tablet every day by oral route.    ATORVASTATIN (LIPITOR) 40 MG TABLET    TAKE 1 TABLET DAILY    AZELASTINE (ASTELIN) 137 MCG (0.1 %) NASAL SPRAY    2 sprays (274 mcg total) by Nasal route 2 (two) times daily.    CALCITRIOL (ROCALTROL) 0.5 MCG CAP    Take 0.5 mcg by mouth once daily.    CLONAZEPAM (KLONOPIN) 1 MG TABLET    TAKE ONE TABLET BY MOUTH DAILY AT BEDTIME    COLESTIPOL (COLESTID) 5 GRAM PACK    Take 5 g by mouth 2 (two) times daily.    ERYTHROMYCIN (ROMYCIN) OPHTHALMIC OINTMENT    every evening.    FENOFIBRATE 160 MG TAB    TAKE 1 TABLET DAILY    FIBER CHOICE ORAL    Take by mouth 4 (four) times daily as needed.    FUROSEMIDE (LASIX) 20 MG TABLET    TAKE 1 TABLET DAILY    ISOSORBIDE MONONITRATE (IMDUR) 30 MG 24  "HR TABLET    Take 30 mg by mouth.    LORATADINE (CLARITIN) 10 MG TABLET    Take 10 mg by mouth once daily.    NITROGLYCERIN (NITROSTAT) 0.4 MG SL TABLET    Place 0.4 mg under the tongue.    PANTOPRAZOLE (PROTONIX) 40 MG TABLET    Take 1 tablet (40 mg total) by mouth once daily.    TOLTERODINE (DETROL LA) 4 MG 24 HR CAPSULE    Take 4 mg by mouth once daily.    TRAMADOL (ULTRAM) 50 MG TABLET    TAKE 2 TABLETS BY MOUTH EVERY TWELVE HOURS AS NEEDED FOR PAIN    UNABLE TO FIND    medication name: CBD Tincture 0.5 under tongue every 4-6 hours for pain    UNABLE TO FIND    medication name: Leg Cramps by Hylands 356 mg 4 times daily    VENLAFAXINE (EFFEXOR-XR) 150 MG CP24    Take 1 capsule (150 mg total) by mouth once daily.    VITAMIN D (VITAMIN D3) 1000 UNITS TAB    Take 1,000 Units by mouth once daily.    VITAMIN E 400 UNIT CAPSULE    Take 400 Units by mouth 2 (two) times daily.         Vital Signs:  BP (!) 183/64   Pulse 79   Ht 5' 2" (1.575 m)   Wt 54 kg (119 lb)   SpO2 96%   BMI 21.77 kg/m²         Physical Exam  Vitals reviewed.   Constitutional:       General: She is awake. She is not in acute distress.     Appearance: Normal appearance. She is well-developed. She is not ill-appearing, toxic-appearing or diaphoretic.   HENT:      Head: Normocephalic and atraumatic.      Nose: Nose normal.      Mouth/Throat:      Mouth: Mucous membranes are moist.      Pharynx: Oropharynx is clear. No oropharyngeal exudate or posterior oropharyngeal erythema.   Eyes:      General: Lids are normal. Gaze aligned appropriately. No scleral icterus.        Right eye: No discharge.         Left eye: No discharge.      Conjunctiva/sclera: Conjunctivae normal.   Neck:      Trachea: Trachea normal.   Cardiovascular:      Rate and Rhythm: Normal rate and regular rhythm.      Pulses:           Radial pulses are 2+ on the right side and 2+ on the left side.   Pulmonary:      Effort: Pulmonary effort is normal. No respiratory distress.      " Breath sounds: No stridor. No wheezing.   Chest:      Chest wall: No tenderness.   Abdominal:      General: Bowel sounds are normal. There is no distension.      Palpations: Abdomen is soft. There is no fluid wave, hepatomegaly or mass.      Tenderness: There is no abdominal tenderness. There is no guarding or rebound.   Musculoskeletal:         General: No tenderness or deformity.      Cervical back: Full passive range of motion without pain and neck supple. No tenderness.      Right lower leg: No edema.      Left lower leg: No edema.   Lymphadenopathy:      Cervical: No cervical adenopathy.   Skin:     General: Skin is warm and dry.      Capillary Refill: Capillary refill takes less than 2 seconds.      Coloration: Skin is not cyanotic, jaundiced or pale.   Neurological:      General: No focal deficit present.      Mental Status: She is alert and oriented to person, place, and time.      Motor: No tremor.   Psychiatric:         Attention and Perception: Attention normal.         Mood and Affect: Mood and affect normal.         Speech: Speech normal.         Behavior: Behavior normal. Behavior is cooperative.            All of the data above and below has been reviewed by myself and any further interpretations will be reflected in the assessment and plan.   The data includes review of external notes, and independent interpretation of lab results, procedures, x-rays, and imaging reports.      Assessment:  Gastric intestinal metaplasia  -     Ambulatory Referral to External Surgery    Dysphagia, unspecified type  -     Ambulatory Referral to External Surgery    Iron deficiency anemia, unspecified iron deficiency anemia type    Gastroesophageal reflux disease, unspecified whether esophagitis present    Chronic diarrhea    History of colon polyps    Esophageal stricture  -     Ambulatory Referral to External Surgery    Dysphagia: 2021 benign gastroesophageal stricture 13 mm dilated to 15 mm. Feels she needs dilation  again.   PADILLA stable. No clear GI source of blood loss. EGD and CE unrevealing.  Last colonoscopy 2021.      Recommendations:  Schedule upper endoscopy.     Risks, benefits, and alternatives of medical management, any associated procedures, and/or treatment discussed with the patient. Patient given opportunity to ask questions and voices understanding. Patient has elected to proceed with the recommended care modalities as discussed.    Instructed patient to notify my office if they have not been contacted within two weeks after any procedures, submitting any samples (biopsies, blood, stool, urine, etc.) or after any imaging (X-ray, CT, MRI, etc.).     Follow up in about 1 year (around 10/3/2024).    Order summary:  Orders Placed This Encounter   Procedures    Ambulatory Referral to External Surgery      This assessment, plan, and documentation was performed in collaboration with Li Santiago NP.      This document may have been created using a voice recognition transcribing system. Incorrect words or phrases may have been missed during proofreading. Please interpret accordingly or contact me for clarification.     Yvette Morgan MD

## 2023-10-03 NOTE — TELEPHONE ENCOUNTER
"[facility]  [address] [place] [address]  Phone: [phone]  Fax: [phone]    History & Physical         Provider: Dr. Yvette Morgan    Patient Name: Leatha DAO (age):1935  88 y.o.           Gender: female   Phone: [phone]     Referring Physician: Dennys Linares     Vital Signs:   Height - 5' 2"  Weight - 119 lb  BMI -  21.77    Plan: EGD w/ DIL (last to 15mm) and gastric mapping @ [facility]     Encounter Diagnoses   Name Primary?    Gastric intestinal metaplasia Yes    Dysphagia, unspecified type     Esophageal stricture            History:      Past Medical History:   Diagnosis Date    Anemia of chronic illness     Anxiety and depression     CAD (coronary artery disease)     Chronic combined systolic and diastolic CHF (congestive heart failure)     CKD (chronic kidney disease) stage 4, GFR 15-29 ml/min     Colon polyp     Dementia     Diabetic polyneuropathy associated with type 2 diabetes mellitus 2023:  EMG nerve conduction study showed generalized mixed sensory-motor polyneuropathy affecting the upper and lower extremities consistent with diabetes.    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     Urge incontinence       Past Surgical History:   Procedure Laterality Date    CARDIAC CATHETERIZATION  2008    CHOLECYSTECTOMY      COLONOSCOPY      CYSTOCELE REPAIR      EPIDURAL STEROID INJECTION  2020    ESOPHAGOGASTRODUODENOSCOPY (EGD) WITH DILATION  2021    HEMORRHOID SURGERY      HYSTERECTOMY      " MASTECTOMY  1995    PERCUTANEOUS TRANSLUMINAL BALLOON ANGIOPLASTY OF CORONARY ARTERY  08/01/2017    PERIPHERAL ARTERIAL STENT GRAFT  09/12/2018      Medication List with Changes/Refills   Current Medications    ACETAMINOPHEN (TYLENOL) 500 MG TABLET    Take 1,000 mg by mouth every 8 (eight) hours as needed for Pain.    ASPIRIN 81 MG CHEW    aspirin 81 mg chewable tablet   Chew 1 tablet every day by oral route.    ATORVASTATIN (LIPITOR) 40 MG TABLET    TAKE 1 TABLET DAILY    AZELASTINE (ASTELIN) 137 MCG (0.1 %) NASAL SPRAY    2 sprays (274 mcg total) by Nasal route 2 (two) times daily.    CALCITRIOL (ROCALTROL) 0.5 MCG CAP    Take 0.5 mcg by mouth once daily.    CLONAZEPAM (KLONOPIN) 1 MG TABLET    TAKE ONE TABLET BY MOUTH DAILY AT BEDTIME    COLESTIPOL (COLESTID) 5 GRAM PACK    Take 5 g by mouth 2 (two) times daily.    ERYTHROMYCIN (ROMYCIN) OPHTHALMIC OINTMENT    every evening.    FENOFIBRATE 160 MG TAB    TAKE 1 TABLET DAILY    FIBER CHOICE ORAL    Take by mouth 4 (four) times daily as needed.    FUROSEMIDE (LASIX) 20 MG TABLET    TAKE 1 TABLET DAILY    ISOSORBIDE MONONITRATE (IMDUR) 30 MG 24 HR TABLET    Take 30 mg by mouth.    LORATADINE (CLARITIN) 10 MG TABLET    Take 10 mg by mouth once daily.    NITROGLYCERIN (NITROSTAT) 0.4 MG SL TABLET    Place 0.4 mg under the tongue.    PANTOPRAZOLE (PROTONIX) 40 MG TABLET    Take 1 tablet (40 mg total) by mouth once daily.    TOLTERODINE (DETROL LA) 4 MG 24 HR CAPSULE    Take 4 mg by mouth once daily.    TRAMADOL (ULTRAM) 50 MG TABLET    TAKE 2 TABLETS BY MOUTH EVERY TWELVE HOURS AS NEEDED FOR PAIN    UNABLE TO FIND    medication name: CBD Tincture 0.5 under tongue every 4-6 hours for pain    UNABLE TO FIND    medication name: Leg Cramps by Hylands 356 mg 4 times daily    VENLAFAXINE (EFFEXOR-XR) 150 MG CP24    Take 1 capsule (150 mg total) by mouth once daily.    VITAMIN D (VITAMIN D3) 1000 UNITS TAB    Take 1,000 Units by mouth once daily.    VITAMIN E 400 UNIT CAPSULE     Take 400 Units by mouth 2 (two) times daily.      Review of patient's allergies indicates:   Allergen Reactions    Ace inhibitors Other (See Comments)    Amoxicillin-pot clavulanate Other (See Comments)    Celecoxib Other (See Comments)    Codeine     Hydrocodone-acetaminophen     Irbesartan Other (See Comments)    Penicillins       Family History   Problem Relation Age of Onset    Breast cancer Mother     Heart disease Father     Epilepsy Father       Social History     Tobacco Use    Smoking status: Former    Smokeless tobacco: Never   Substance Use Topics    Alcohol use: Not Currently    Drug use: Yes     Types: Other-see comments     Comment: CBD Drops and lotion        Physical Examination:     General Appearance:___________________________  HEENT: _____________________________________  Abdomen:____________________________________  Heart:________________________________________  Lungs:_______________________________________  Extremities:___________________________________  Skin:_________________________________________  Endocrine:____________________________________  Genitourinary:_________________________________  Neurological:__________________________________      Patient has been evaluated immediately prior to sedation and is medically cleared for endoscopy with IVCS as an ASA class: ______      Physician Signature: _________________________       Date: ________  Time: ________

## 2023-10-13 ENCOUNTER — TELEPHONE (OUTPATIENT)
Dept: GASTROENTEROLOGY | Facility: CLINIC | Age: 88
End: 2023-10-13
Payer: MEDICARE

## 2023-10-13 NOTE — TELEPHONE ENCOUNTER
Patient said Bettina already spoke with her and that they have decided to keep their scheduled date for 10/25/23. - dmp

## 2023-10-13 NOTE — TELEPHONE ENCOUNTER
----- Message from Guadalupe Whaley sent at 10/13/2023  9:03 AM CDT -----  Contact: Lexie -daughter  Patient's daughter Lexie is calling in reference to rescheduling her procedure on 10/25 with Dr Morgan. Please call back at [phone]

## 2023-10-23 ENCOUNTER — INFUSION (OUTPATIENT)
Dept: INFUSION THERAPY | Facility: HOSPITAL | Age: 88
End: 2023-10-23
Attending: INTERNAL MEDICINE
Payer: MEDICARE

## 2023-10-23 DIAGNOSIS — M81.0 AGE-RELATED OSTEOPOROSIS WITHOUT CURRENT PATHOLOGICAL FRACTURE: Primary | ICD-10-CM

## 2023-10-23 DIAGNOSIS — N18.9 ANEMIA OF CHRONIC RENAL FAILURE, UNSPECIFIED CKD STAGE: Primary | ICD-10-CM

## 2023-10-23 DIAGNOSIS — D63.1 ANEMIA OF CHRONIC RENAL FAILURE, UNSPECIFIED CKD STAGE: Primary | ICD-10-CM

## 2023-10-23 DIAGNOSIS — M81.0 AGE-RELATED OSTEOPOROSIS WITHOUT CURRENT PATHOLOGICAL FRACTURE: ICD-10-CM

## 2023-10-23 LAB
HCT VFR BLD AUTO: 32.4 % (ref 36–48)
HGB BLD-MCNC: 10.4 G/DL (ref 11.8–16)

## 2023-10-23 PROCEDURE — 63600175 PHARM REV CODE 636 W HCPCS: Mod: JZ,JG | Performed by: FAMILY MEDICINE

## 2023-10-23 PROCEDURE — 85014 HEMATOCRIT: CPT

## 2023-10-23 PROCEDURE — 96372 THER/PROPH/DIAG INJ SC/IM: CPT

## 2023-10-23 PROCEDURE — 36415 COLL VENOUS BLD VENIPUNCTURE: CPT

## 2023-10-23 RX ADMIN — DENOSUMAB 60 MG: 60 INJECTION SUBCUTANEOUS at 10:10

## 2023-10-23 NOTE — PLAN OF CARE
PT IN ROOM IN STABLE CONDITION, PROLIA GIVEN TO Left lower quad. abdomen. PT TOLERATED WELL. PT STATES THAT THEY HAVE RECEIVED PROLIA IN THE PAST WITHOUT SIGNS AND SYMPTOMS OF A REACTION. INSTRUCTED PATIENT TO CALL PCP IF NEEDED.

## 2023-10-25 ENCOUNTER — OUTSIDE PLACE OF SERVICE (OUTPATIENT)
Dept: GASTROENTEROLOGY | Facility: CLINIC | Age: 88
End: 2023-10-25

## 2023-10-25 DIAGNOSIS — F33.41 RECURRENT MAJOR DEPRESSIVE DISORDER, IN PARTIAL REMISSION: ICD-10-CM

## 2023-10-25 PROCEDURE — 43239 PR EGD, FLEX, W/BIOPSY, SGL/MULTI: ICD-10-PCS | Mod: 59,,, | Performed by: INTERNAL MEDICINE

## 2023-10-25 PROCEDURE — 43239 EGD BIOPSY SINGLE/MULTIPLE: CPT | Mod: 59,,, | Performed by: INTERNAL MEDICINE

## 2023-10-25 PROCEDURE — 43249 ESOPH EGD DILATION <30 MM: CPT | Mod: ,,, | Performed by: INTERNAL MEDICINE

## 2023-10-25 PROCEDURE — 43249 PR EGD, FLEX, W/BALL DILATION, < 30MM: ICD-10-PCS | Mod: ,,, | Performed by: INTERNAL MEDICINE

## 2023-10-25 RX ORDER — VENLAFAXINE HYDROCHLORIDE 150 MG/1
150 CAPSULE, EXTENDED RELEASE ORAL DAILY
Qty: 90 CAPSULE | Refills: 3 | Status: SHIPPED | OUTPATIENT
Start: 2023-10-25

## 2023-10-26 LAB — SPECIMEN TO PATHOLOGY: NORMAL

## 2023-10-29 ENCOUNTER — TELEPHONE (OUTPATIENT)
Dept: GASTROENTEROLOGY | Facility: CLINIC | Age: 88
End: 2023-10-29
Payer: MEDICARE

## 2023-10-29 NOTE — TELEPHONE ENCOUNTER
ProxBBx: focal (<0.5mm, h/o) neuroendo cell hyperplasia, Gastric mapping: mild chr inact gitis w/react w/o IM/Hp throughout.    Notify patient that one of her stomach Bx samples had some changes similar to her 2021 Bx.  We can review it further on her follow up OV with me. Any improvement in swallowing with the dilation? We can repeat dilation as needed.  She will need to let me know.  NBP

## 2023-10-30 ENCOUNTER — TELEPHONE (OUTPATIENT)
Dept: GASTROENTEROLOGY | Facility: CLINIC | Age: 88
End: 2023-10-30
Payer: MEDICARE

## 2023-10-30 NOTE — TELEPHONE ENCOUNTER
----- Message from Guadalupe Whaley sent at 10/30/2023  4:10 PM CDT -----  Contact: self  Type:  Test Results    Who Called: Leatha Adames  Name of Test (Lab/Mammo/Etc): Unsure, said they used a scope down her esophagus and check spots that Dr. Morales checked.   Date of Test: 10/25  Ordering Provider: Dr. Morgan  Where the test was performed: [facility]  Would the patient rather a call back or a response via [facility]? Call back  Best Call Back Number: [phone]  Additional Information:  N/a

## 2023-10-30 NOTE — TELEPHONE ENCOUNTER
I spoke to daughter and gave results of procedure.  She states her mom has not complained about any problems since the dilatation.  They will call if she has any problems.

## 2023-11-02 ENCOUNTER — CLINICAL SUPPORT (OUTPATIENT)
Dept: FAMILY MEDICINE | Facility: CLINIC | Age: 88
End: 2023-11-02
Payer: MEDICARE

## 2023-11-02 DIAGNOSIS — Z23 ENCOUNTER FOR IMMUNIZATION: Primary | ICD-10-CM

## 2023-11-02 PROCEDURE — 90694 VACC AIIV4 NO PRSRV 0.5ML IM: CPT | Mod: ,,, | Performed by: FAMILY MEDICINE

## 2023-11-02 PROCEDURE — 90694 FLU VACCINE - QUADRIVALENT - ADJUVANTED: ICD-10-PCS | Mod: ,,, | Performed by: FAMILY MEDICINE

## 2023-11-02 PROCEDURE — G0008 ADMIN INFLUENZA VIRUS VAC: HCPCS | Mod: ,,, | Performed by: FAMILY MEDICINE

## 2023-11-02 PROCEDURE — G0008 FLU VACCINE - QUADRIVALENT - ADJUVANTED: ICD-10-PCS | Mod: ,,, | Performed by: FAMILY MEDICINE

## 2023-11-12 DIAGNOSIS — M81.0 AGE-RELATED OSTEOPOROSIS WITHOUT CURRENT PATHOLOGICAL FRACTURE: Primary | ICD-10-CM

## 2023-11-13 RX ORDER — CALCITRIOL 0.5 UG/1
0.5 CAPSULE ORAL
Qty: 90 CAPSULE | Refills: 3 | Status: SHIPPED | OUTPATIENT
Start: 2023-11-13

## 2023-11-15 ENCOUNTER — TELEPHONE (OUTPATIENT)
Dept: FAMILY MEDICINE | Facility: CLINIC | Age: 88
End: 2023-11-15
Payer: MEDICARE

## 2023-11-27 ENCOUNTER — INFUSION (OUTPATIENT)
Dept: INFUSION THERAPY | Facility: HOSPITAL | Age: 88
End: 2023-11-27
Attending: INTERNAL MEDICINE
Payer: MEDICARE

## 2023-11-27 VITALS
TEMPERATURE: 98 F | RESPIRATION RATE: 20 BRPM | OXYGEN SATURATION: 98 % | HEART RATE: 75 BPM | SYSTOLIC BLOOD PRESSURE: 177 MMHG | DIASTOLIC BLOOD PRESSURE: 59 MMHG

## 2023-11-27 DIAGNOSIS — D63.1 ANEMIA OF CHRONIC RENAL FAILURE, UNSPECIFIED CKD STAGE: ICD-10-CM

## 2023-11-27 DIAGNOSIS — N18.9 ANEMIA OF CHRONIC RENAL FAILURE, UNSPECIFIED CKD STAGE: ICD-10-CM

## 2023-11-27 LAB
HCT VFR BLD AUTO: 30 % (ref 36–48)
HGB BLD-MCNC: 10 G/DL (ref 11.8–16)

## 2023-11-27 PROCEDURE — 85014 HEMATOCRIT: CPT

## 2023-11-27 PROCEDURE — 36415 COLL VENOUS BLD VENIPUNCTURE: CPT

## 2023-11-27 NOTE — PLAN OF CARE
Pt here for labs and injection. Results of H/H 10.0/30.0, according to MD order, pt does not need Retacrit shot. Pt informed, appt made for next month.

## 2023-12-22 PROCEDURE — 85025 COMPLETE CBC W/AUTO DIFF WBC: CPT | Performed by: FAMILY MEDICINE

## 2023-12-22 PROCEDURE — 80053 COMPREHEN METABOLIC PANEL: CPT | Performed by: FAMILY MEDICINE

## 2023-12-22 PROCEDURE — 83036 HEMOGLOBIN GLYCOSYLATED A1C: CPT | Performed by: FAMILY MEDICINE

## 2023-12-26 ENCOUNTER — TELEPHONE (OUTPATIENT)
Dept: FAMILY MEDICINE | Facility: CLINIC | Age: 88
End: 2023-12-26
Payer: MEDICARE

## 2023-12-27 NOTE — TELEPHONE ENCOUNTER
Spoke to Lexie, suggested trying to put it in pudding. She states she doesn't think this is a good idea, but she'll try.

## 2023-12-28 ENCOUNTER — INFUSION (OUTPATIENT)
Dept: INFUSION THERAPY | Facility: HOSPITAL | Age: 88
End: 2023-12-28
Attending: INTERNAL MEDICINE
Payer: MEDICARE

## 2023-12-28 VITALS
HEART RATE: 89 BPM | OXYGEN SATURATION: 96 % | SYSTOLIC BLOOD PRESSURE: 158 MMHG | TEMPERATURE: 98 F | RESPIRATION RATE: 20 BRPM | DIASTOLIC BLOOD PRESSURE: 68 MMHG

## 2023-12-28 DIAGNOSIS — D63.1 ANEMIA OF CHRONIC RENAL FAILURE, UNSPECIFIED CKD STAGE: ICD-10-CM

## 2023-12-28 DIAGNOSIS — N18.9 ANEMIA OF CHRONIC RENAL FAILURE, UNSPECIFIED CKD STAGE: ICD-10-CM

## 2023-12-28 LAB
HCT VFR BLD AUTO: 32.7 % (ref 36–48)
HGB BLD-MCNC: 10.8 G/DL (ref 11.8–16)

## 2023-12-28 PROCEDURE — 36415 COLL VENOUS BLD VENIPUNCTURE: CPT

## 2023-12-28 PROCEDURE — 85014 HEMATOCRIT: CPT

## 2023-12-28 NOTE — PLAN OF CARE
PT HERE FOR EPOGEN, LABS DRAWN AND RESULTED. PT'S HEMOGLOBIN EXCEEDS THE PARAMETER, PT NOTIFIED DOES NOT NEED INJECTION OF EPOGEN TODAY. WHEELED OUT OF DEPT PER SON, NOTIFIED TO CALL MD FOR ANY NEEDS.

## 2024-01-02 ENCOUNTER — OFFICE VISIT (OUTPATIENT)
Dept: FAMILY MEDICINE | Facility: CLINIC | Age: 89
End: 2024-01-02
Payer: MEDICARE

## 2024-01-02 VITALS
TEMPERATURE: 97 F | BODY MASS INDEX: 21.31 KG/M2 | DIASTOLIC BLOOD PRESSURE: 60 MMHG | HEIGHT: 62 IN | OXYGEN SATURATION: 99 % | SYSTOLIC BLOOD PRESSURE: 138 MMHG | WEIGHT: 115.81 LBS | HEART RATE: 79 BPM

## 2024-01-02 DIAGNOSIS — E78.00 PURE HYPERCHOLESTEROLEMIA: ICD-10-CM

## 2024-01-02 DIAGNOSIS — N18.4 CHRONIC KIDNEY DISEASE, STAGE IV (SEVERE): ICD-10-CM

## 2024-01-02 DIAGNOSIS — D50.0 IRON DEFICIENCY ANEMIA DUE TO CHRONIC BLOOD LOSS: ICD-10-CM

## 2024-01-02 DIAGNOSIS — I50.32 CHRONIC DIASTOLIC (CONGESTIVE) HEART FAILURE: ICD-10-CM

## 2024-01-02 DIAGNOSIS — I20.9 ANGINA PECTORIS: ICD-10-CM

## 2024-01-02 DIAGNOSIS — N39.42 URINARY INCONTINENCE WITHOUT SENSORY AWARENESS: ICD-10-CM

## 2024-01-02 DIAGNOSIS — I73.9 PERIPHERAL VASCULAR DISEASE: ICD-10-CM

## 2024-01-02 DIAGNOSIS — F33.41 RECURRENT MAJOR DEPRESSIVE DISORDER, IN PARTIAL REMISSION: ICD-10-CM

## 2024-01-02 DIAGNOSIS — E11.59 TYPE 2 DIABETES MELLITUS WITH OTHER CIRCULATORY COMPLICATIONS: Primary | ICD-10-CM

## 2024-01-02 PROBLEM — M46.02 SPINAL ENTHESOPATHY, CERVICAL REGION: Status: RESOLVED | Noted: 2023-02-08 | Resolved: 2024-01-02

## 2024-01-02 PROCEDURE — 99214 OFFICE O/P EST MOD 30 MIN: CPT | Mod: ,,, | Performed by: FAMILY MEDICINE

## 2024-01-02 RX ORDER — FESOTERODINE FUMARATE 4 MG/1
4 TABLET, EXTENDED RELEASE ORAL DAILY
Qty: 90 TABLET | Refills: 3 | Status: SHIPPED | OUTPATIENT
Start: 2024-01-02 | End: 2025-01-01

## 2024-01-02 NOTE — ASSESSMENT & PLAN NOTE
Discontinue tolterodine as it was not effective for treatment    Restart Toviaz 4 mg daily and increase to 8 mg if needed

## 2024-01-02 NOTE — PROGRESS NOTES
"SUBJECTIVE:  HPI    Leatha Adames is a 88 y.o. female here for Follow-up (3mth lab f/u) on chronic health conditions.  See assessment and plan for complete discussion.    She began having a runny nose and diarrhea in the last 24 hours.  Otherwise, she has been feeling very well up until now.  She is eating and drinking okay.  She has not vomiting.      She would like to restart Toviaz.  Tolterodine has not provided as much relief as Toviaz did in the past.    Curts allergies, medications, history, and problem list were updated as appropriate.    ROS:  Pertinent ROS as above, otherwise negative    OBJECTIVE:  Vital signs  Visit Vitals  /60 (BP Location: Left arm)   Pulse 79   Temp 97 °F (36.1 °C)   Ht 5' 2" (1.575 m)   Wt 52.5 kg (115 lb 12.8 oz)   SpO2 99%   BMI 21.18 kg/m²          PHYSICAL EXAM:  General: Awake, alert, no acute distress  Cardiovascular:  Regular rhythm.  Normal rate.  No murmurs.  Respiratory: Clear to auscultation bilaterally, normal effort  Extremities:  No cyanosis, no clubbing, no edema  Skin:  No rashes or appreciable lesions   Psychiatric:  Normal mood and affect.    Chemistry:  Lab Results   Component Value Date     12/22/2023    K 4.4 12/22/2023    BUN 30.0 (H) 12/22/2023    CREATININE 1.78 (H) 12/22/2023    EGFRNORACEVR 28 12/22/2023    GLUCOSE 129 (H) 12/22/2023    CALCIUM 10.1 12/22/2023    ALKPHOS 28 (L) 12/22/2023    AST 35 12/22/2023    ALT 23 12/22/2023    TSH 3.450 06/08/2023    YRRSBO4FCZB 0.72 03/10/2022        Lab Results   Component Value Date    HGBA1C 5.7 12/22/2023        Hematology:  Lab Results   Component Value Date    WBC 5.99 12/22/2023    HGB 10.8 (L) 12/28/2023    MCV 92.9 12/22/2023     12/22/2023       Lipid Panel:  Lab Results   Component Value Date    CHOL 150 09/11/2023    HDL 55 09/11/2023    DLDL 60.6 09/11/2023    TRIG 164 09/11/2023        ASSESSMENT/PLAN:  1. Type 2 diabetes mellitus with other circulatory complications  Overview:  Lab " "Results   Component Value Date    HGBA1C 5.7 12/22/2023         Assessment & Plan:  Currently off of diabetes treatment     Continue to monitor    Orders:  -     Hemoglobin A1C; Future; Expected date: 04/02/2024  -     Microalbumin/Creatinine Ratio, Urine; Future; Expected date: 04/02/2024    2. Peripheral vascular disease  Overview:  September 2018: Stent and angioplasty to right leg-SFA and tibiofemoral disease; 75% left superficial    Assessment & Plan:  On aspirin, statin, Plavix      3. Chronic kidney disease, stage IV (severe)  Overview:  Lab Results   Component Value Date    CREATININE 1.78 (H) 12/22/2023    EGFRNONAA 30 03/10/2022    EGFRNONAA 38 04/23/2021       Assessment & Plan:  Stable    On Lasix 20 mg daily, calcitriol 0.5 mcg daily    Followed by Nephrology    Orders:  -     Comprehensive Metabolic Panel; Future; Expected date: 04/02/2024    4. 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 loss  Overview:  Lab Results   Component Value Date    WBC 5.99 12/22/2023    HGB 10.8 (L) 12/28/2023    HCT 32.7 (L) 12/28/2023    MCV 92.9 12/22/2023     12/22/2023       Lab Results   Component Value Date    UIBC 247 09/11/2023    IRON 118 09/11/2023    TRANS 315 09/11/2023    TIBC 365 09/11/2023    LABIRON 32 09/11/2023          Assessment & Plan:  Continue surveillance    Orders:  -     CBC Auto Differential; Future; Expected date: 04/02/2024  -     Ferritin; Future; Expected date: 04/02/2024  -     Iron and TIBC; Future; Expected date: 04/02/2024    5. Pure hypercholesterolemia  Overview:  Lab Results   Component Value Date    CHOL 150 09/11/2023    CHOL 140 07/17/2023    CHOL 136 06/08/2023     Lab Results   Component Value Date    HDL 55 09/11/2023    HDL 47 07/17/2023    HDL 48 06/08/2023     No results found for: "LDLCALC"  Lab Results   Component Value Date    DLDL 60.6 09/11/2023    DLDL 59.0 07/17/2023    DLDL 57.8 06/08/2023     Lab Results   Component Value Date    TRIG 164 09/11/2023    TRIG 205 " "(H) 07/17/2023    TRIG 172 06/08/2023       f1 No results found for: "CHOLHDL"      Assessment & Plan:  On atorvastatin 40 mg daily  with LDL at goal of less than 70     Discontinue fenofibrate because of age, comorbidities of stage 4 chronic kidney disease and inability to swallow tablet    Orders:  -     Lipid Panel; Future; Expected date: 04/02/2024    6. Chronic diastolic (congestive) heart failure  Assessment & Plan:  On Lasix 20 mg daily, Imdur      7. Recurrent major depressive disorder, in partial remission  Assessment & Plan:  Venlafaxine extended release to 150 mg daily      8. Angina pectoris  Assessment & Plan:  On aspirin, statin, Plavix, Imdur      9. Urinary incontinence without sensory awareness  Assessment & Plan:  Discontinue tolterodine as it was not effective for treatment    Restart Toviaz 4 mg daily and increase to 8 mg if needed    Orders:  -     fesoterodine (TOVIAZ) 4 mg Tb24; Take 1 tablet (4 mg total) by mouth once daily.  Dispense: 90 tablet; Refill: 3      Follow Up:  Follow up in about 3 months (around 4/2/2024) for Fasting labs, Follow-up.          "

## 2024-01-02 NOTE — ASSESSMENT & PLAN NOTE
On atorvastatin 40 mg daily  with LDL at goal of less than 70     Discontinue fenofibrate because of age, comorbidities of stage 4 chronic kidney disease and inability to swallow tablet

## 2024-01-09 ENCOUNTER — OFFICE VISIT (OUTPATIENT)
Dept: FAMILY MEDICINE | Facility: CLINIC | Age: 89
End: 2024-01-09
Payer: MEDICARE

## 2024-01-09 VITALS
BODY MASS INDEX: 21.71 KG/M2 | TEMPERATURE: 98 F | DIASTOLIC BLOOD PRESSURE: 60 MMHG | HEIGHT: 62 IN | HEART RATE: 81 BPM | SYSTOLIC BLOOD PRESSURE: 132 MMHG | WEIGHT: 118 LBS | OXYGEN SATURATION: 96 %

## 2024-01-09 DIAGNOSIS — R05.1 ACUTE COUGH: ICD-10-CM

## 2024-01-09 DIAGNOSIS — J01.80 ACUTE NON-RECURRENT SINUSITIS OF OTHER SINUS: Primary | ICD-10-CM

## 2024-01-09 LAB
CTP QC/QA: YES
SARS-COV-2 AG RESP QL IA.RAPID: NEGATIVE

## 2024-01-09 PROCEDURE — 87811 SARS-COV-2 COVID19 W/OPTIC: CPT | Mod: QW,RHCUB | Performed by: FAMILY MEDICINE

## 2024-01-09 PROCEDURE — 99214 OFFICE O/P EST MOD 30 MIN: CPT | Mod: ,,, | Performed by: FAMILY MEDICINE

## 2024-01-09 RX ORDER — CEFDINIR 300 MG/1
300 CAPSULE ORAL 2 TIMES DAILY
Qty: 20 CAPSULE | Refills: 0 | Status: SHIPPED | OUTPATIENT
Start: 2024-01-09 | End: 2024-01-19

## 2024-01-09 NOTE — PROGRESS NOTES
"SUBJECTIVE:  HPI    Leatha Adames is a 88 y.o. female here for Cough (Cough, congestion, lightheaded >1week).  Six day history cough productive of thick brown sputum, nasal congestion mucopurulent drainage, general malaise, subjective fever and mild facial pain and pressure.  Her symptoms have persisted since onset.  She has some generalized weakness and dyspnea with exertion.      Curts allergies, medications, history, and problem list were updated as appropriate.    ROS:  Pertinent ROS as above, otherwise negative    OBJECTIVE:  Vital signs  Visit Vitals  /60 (BP Location: Left arm)   Pulse 81   Temp 97.9 °F (36.6 °C) (Temporal)   Ht 5' 2" (1.575 m)   Wt 53.5 kg (118 lb)   SpO2 96%   BMI 21.58 kg/m²          PHYSICAL EXAM:  General:  Awake, alert, no acute distress   Eyes:  Pupils equal, round, reactive to light.  Conjunctiva normal bilaterally.  Ears:  Bilateral external auditory canals normal.  Bilateral tympanic membranes normal.  Oropharynx:  Postnasal drainage present.  No erythema or exudate.  Neck:  No lymphadenopathy  Cardiovascular: Regular rate and rhythm.  Six systolic murmur at the right upper sternal border  Respiratory: Clear to auscultation bilaterally, normal effort  Skin: No rashes    Recent Results (from the past 24 hour(s))   SARS Coronavirus 2 Antigen, POCT Manual Read    Collection Time: 01/09/24 10:46 AM   Result Value Ref Range    SARS Coronavirus 2 Antigen Negative Negative     Acceptable Yes        ASSESSMENT/PLAN:  1. Acute non-recurrent sinusitis of other sinus  -     cefdinir (OMNICEF) 300 MG capsule; Take 1 capsule (300 mg total) by mouth 2 (two) times daily. for 10 days  Dispense: 20 capsule; Refill: 0    2. Acute cough  -     SARS Coronavirus 2 Antigen, POCT Manual Read; Future; Expected date: 01/09/2024      "

## 2024-01-29 ENCOUNTER — INFUSION (OUTPATIENT)
Dept: INFUSION THERAPY | Facility: HOSPITAL | Age: 89
End: 2024-01-29
Attending: INTERNAL MEDICINE
Payer: MEDICARE

## 2024-01-29 VITALS
SYSTOLIC BLOOD PRESSURE: 148 MMHG | DIASTOLIC BLOOD PRESSURE: 65 MMHG | TEMPERATURE: 98 F | RESPIRATION RATE: 18 BRPM | OXYGEN SATURATION: 97 % | HEART RATE: 85 BPM

## 2024-01-29 DIAGNOSIS — D63.1 ANEMIA OF CHRONIC RENAL FAILURE, UNSPECIFIED CKD STAGE: ICD-10-CM

## 2024-01-29 DIAGNOSIS — N18.4 ANEMIA DUE TO STAGE 4 CHRONIC KIDNEY DISEASE TREATED WITH ERYTHROPOIETIN: Primary | ICD-10-CM

## 2024-01-29 DIAGNOSIS — D63.1 ANEMIA DUE TO STAGE 4 CHRONIC KIDNEY DISEASE TREATED WITH ERYTHROPOIETIN: Primary | ICD-10-CM

## 2024-01-29 DIAGNOSIS — N18.9 ANEMIA OF CHRONIC RENAL FAILURE, UNSPECIFIED CKD STAGE: ICD-10-CM

## 2024-01-29 LAB
HCT VFR BLD AUTO: 28.9 % (ref 36–48)
HGB BLD-MCNC: 9.4 G/DL (ref 11.8–16)

## 2024-01-29 PROCEDURE — 36415 COLL VENOUS BLD VENIPUNCTURE: CPT

## 2024-01-29 PROCEDURE — 63600175 PHARM REV CODE 636 W HCPCS: Mod: EC,JG

## 2024-01-29 PROCEDURE — 85018 HEMOGLOBIN: CPT

## 2024-01-29 PROCEDURE — 96372 THER/PROPH/DIAG INJ SC/IM: CPT

## 2024-01-29 RX ADMIN — EPOETIN ALFA 20000 UNITS: 20000 SOLUTION INTRAVENOUS; SUBCUTANEOUS at 09:01

## 2024-02-12 ENCOUNTER — TELEPHONE (OUTPATIENT)
Dept: FAMILY MEDICINE | Facility: CLINIC | Age: 89
End: 2024-02-12
Payer: MEDICARE

## 2024-02-12 DIAGNOSIS — R11.0 NAUSEA: Primary | ICD-10-CM

## 2024-02-12 RX ORDER — ONDANSETRON 4 MG/1
4 TABLET, ORALLY DISINTEGRATING ORAL EVERY 6 HOURS PRN
Qty: 10 TABLET | Refills: 0 | Status: SHIPPED | OUTPATIENT
Start: 2024-02-12 | End: 2024-06-03

## 2024-02-20 RX ORDER — TRAMADOL HYDROCHLORIDE 50 MG/1
50 TABLET ORAL EVERY 8 HOURS PRN
Qty: 30 TABLET | Refills: 0 | Status: SHIPPED | OUTPATIENT
Start: 2024-02-20 | End: 2024-03-20

## 2024-02-29 ENCOUNTER — INFUSION (OUTPATIENT)
Dept: INFUSION THERAPY | Facility: HOSPITAL | Age: 89
End: 2024-02-29
Attending: NURSE PRACTITIONER
Payer: MEDICARE

## 2024-02-29 ENCOUNTER — LAB VISIT (OUTPATIENT)
Dept: LAB | Facility: HOSPITAL | Age: 89
End: 2024-02-29
Attending: INTERNAL MEDICINE
Payer: MEDICARE

## 2024-02-29 DIAGNOSIS — D63.1 ANEMIA OF CHRONIC RENAL FAILURE, UNSPECIFIED CKD STAGE: ICD-10-CM

## 2024-02-29 DIAGNOSIS — N18.9 ANEMIA OF CHRONIC RENAL FAILURE, UNSPECIFIED CKD STAGE: ICD-10-CM

## 2024-02-29 LAB
HCT VFR BLD AUTO: 34.4 % (ref 36–48)
HGB BLD-MCNC: 11.3 G/DL (ref 11.8–16)

## 2024-02-29 PROCEDURE — 85018 HEMOGLOBIN: CPT

## 2024-02-29 PROCEDURE — 36415 COLL VENOUS BLD VENIPUNCTURE: CPT

## 2024-02-29 NOTE — PROGRESS NOTES
PT DOES NOT NEED HER MEDICATION TODAY. HER LEVEL IS ABOVE REQUIRED LEVEL. WILL SCHEDULE APPOINTMENT FOR NEXT MONTH.

## 2024-03-05 ENCOUNTER — TELEPHONE (OUTPATIENT)
Dept: GASTROENTEROLOGY | Facility: CLINIC | Age: 89
End: 2024-03-05

## 2024-03-05 ENCOUNTER — HOSPITAL ENCOUNTER (EMERGENCY)
Facility: HOSPITAL | Age: 89
Discharge: ANOTHER HOSPITAL | End: 2024-03-05
Attending: EMERGENCY MEDICINE
Payer: MEDICARE

## 2024-03-05 VITALS
HEART RATE: 68 BPM | HEIGHT: 63 IN | WEIGHT: 111 LBS | RESPIRATION RATE: 20 BRPM | DIASTOLIC BLOOD PRESSURE: 87 MMHG | BODY MASS INDEX: 19.67 KG/M2 | OXYGEN SATURATION: 97 % | SYSTOLIC BLOOD PRESSURE: 195 MMHG | TEMPERATURE: 98 F

## 2024-03-05 DIAGNOSIS — N18.9 CHRONIC KIDNEY DISEASE, UNSPECIFIED CKD STAGE: ICD-10-CM

## 2024-03-05 DIAGNOSIS — I63.9 CEREBROVASCULAR ACCIDENT (CVA), UNSPECIFIED MECHANISM: Primary | ICD-10-CM

## 2024-03-05 DIAGNOSIS — R19.7 NAUSEA VOMITING AND DIARRHEA: ICD-10-CM

## 2024-03-05 DIAGNOSIS — R29.818 ACUTE FOCAL NEUROLOGICAL DEFICIT: ICD-10-CM

## 2024-03-05 DIAGNOSIS — R11.2 NAUSEA VOMITING AND DIARRHEA: ICD-10-CM

## 2024-03-05 DIAGNOSIS — E83.42 HYPOMAGNESEMIA: ICD-10-CM

## 2024-03-05 LAB
ALBUMIN SERPL-MCNC: 4.7 G/DL (ref 3.4–5)
ALBUMIN/GLOB SERPL: 1.5 RATIO
ALP SERPL-CCNC: 41 UNIT/L (ref 50–144)
ALT SERPL-CCNC: 22 UNIT/L (ref 1–45)
ANION GAP SERPL CALC-SCNC: 8 MEQ/L (ref 2–13)
APTT PPP: 25.9 SECONDS (ref 23–29.4)
AST SERPL-CCNC: 34 UNIT/L (ref 14–36)
BASOPHILS # BLD AUTO: 0.03 X10(3)/MCL (ref 0.01–0.08)
BASOPHILS NFR BLD AUTO: 0.3 % (ref 0.1–1.2)
BILIRUB SERPL-MCNC: 0.3 MG/DL (ref 0–1)
BUN SERPL-MCNC: 25 MG/DL (ref 7–20)
CALCIUM SERPL-MCNC: 9.2 MG/DL (ref 8.4–10.2)
CHLORIDE SERPL-SCNC: 104 MMOL/L (ref 98–110)
CO2 SERPL-SCNC: 28 MMOL/L (ref 21–32)
CREAT SERPL-MCNC: 1.47 MG/DL (ref 0.66–1.25)
CREAT/UREA NIT SERPL: 17 (ref 12–20)
EOSINOPHIL # BLD AUTO: 0.27 X10(3)/MCL (ref 0.04–0.36)
EOSINOPHIL NFR BLD AUTO: 3.1 % (ref 0.7–7)
ERYTHROCYTE [DISTWIDTH] IN BLOOD BY AUTOMATED COUNT: 12.8 % (ref 11–14.5)
GFR SERPLBLD CREATININE-BSD FMLA CKD-EPI: 34 MLS/MIN/1.73/M2
GLOBULIN SER-MCNC: 3.1 GM/DL (ref 2–3.9)
GLUCOSE SERPL-MCNC: 150 MG/DL (ref 70–115)
HCT VFR BLD AUTO: 35.2 % (ref 36–48)
HGB BLD-MCNC: 11.5 G/DL (ref 11.8–16)
IMM GRANULOCYTES # BLD AUTO: 0.02 X10(3)/MCL (ref 0–0.03)
IMM GRANULOCYTES NFR BLD AUTO: 0.2 % (ref 0–0.5)
INR PPP: 1.1
LYMPHOCYTES # BLD AUTO: 2.56 X10(3)/MCL (ref 1.16–3.74)
LYMPHOCYTES NFR BLD AUTO: 29.3 % (ref 20–55)
MAGNESIUM SERPL-MCNC: 1.1 MG/DL (ref 1.8–2.4)
MCH RBC QN AUTO: 28.7 PG (ref 27–34)
MCHC RBC AUTO-ENTMCNC: 32.7 G/DL (ref 31–37)
MCV RBC AUTO: 87.8 FL (ref 79–99)
MONOCYTES # BLD AUTO: 0.61 X10(3)/MCL (ref 0.24–0.36)
MONOCYTES NFR BLD AUTO: 7 % (ref 4.7–12.5)
NEUTROPHILS # BLD AUTO: 5.25 X10(3)/MCL (ref 1.56–6.13)
NEUTROPHILS NFR BLD AUTO: 60.1 % (ref 37–73)
NRBC BLD AUTO-RTO: 0 %
PLATELET # BLD AUTO: 249 X10(3)/MCL (ref 140–371)
PMV BLD AUTO: 9.8 FL (ref 9.4–12.4)
POCT GLUCOSE: 140 MG/DL (ref 70–110)
POTASSIUM SERPL-SCNC: 3.4 MMOL/L (ref 3.5–5.1)
PROT SERPL-MCNC: 7.8 GM/DL (ref 6.3–8.2)
PROTHROMBIN TIME: 11 SECONDS (ref 9.3–11.9)
RBC # BLD AUTO: 4.01 X10(6)/MCL (ref 4–5.1)
SODIUM SERPL-SCNC: 140 MMOL/L (ref 135–145)
TSH SERPL-ACNC: 2.31 UIU/ML (ref 0.36–3.74)
WBC # SPEC AUTO: 8.74 X10(3)/MCL (ref 4–11.5)

## 2024-03-05 PROCEDURE — 85610 PROTHROMBIN TIME: CPT | Performed by: EMERGENCY MEDICINE

## 2024-03-05 PROCEDURE — 80053 COMPREHEN METABOLIC PANEL: CPT | Performed by: EMERGENCY MEDICINE

## 2024-03-05 PROCEDURE — G0425 INPT/ED TELECONSULT30: HCPCS | Mod: 95,G0,, | Performed by: STUDENT IN AN ORGANIZED HEALTH CARE EDUCATION/TRAINING PROGRAM

## 2024-03-05 PROCEDURE — 93005 ELECTROCARDIOGRAM TRACING: CPT

## 2024-03-05 PROCEDURE — 96366 THER/PROPH/DIAG IV INF ADDON: CPT

## 2024-03-05 PROCEDURE — 93010 ELECTROCARDIOGRAM REPORT: CPT | Mod: ,,, | Performed by: INTERNAL MEDICINE

## 2024-03-05 PROCEDURE — 96365 THER/PROPH/DIAG IV INF INIT: CPT

## 2024-03-05 PROCEDURE — 63600175 PHARM REV CODE 636 W HCPCS: Performed by: EMERGENCY MEDICINE

## 2024-03-05 PROCEDURE — 84443 ASSAY THYROID STIM HORMONE: CPT | Performed by: EMERGENCY MEDICINE

## 2024-03-05 PROCEDURE — 25000003 PHARM REV CODE 250: Performed by: EMERGENCY MEDICINE

## 2024-03-05 PROCEDURE — 85025 COMPLETE CBC W/AUTO DIFF WBC: CPT | Performed by: EMERGENCY MEDICINE

## 2024-03-05 PROCEDURE — 85730 THROMBOPLASTIN TIME PARTIAL: CPT | Performed by: EMERGENCY MEDICINE

## 2024-03-05 PROCEDURE — 99291 CRITICAL CARE FIRST HOUR: CPT | Mod: 25

## 2024-03-05 PROCEDURE — 83735 ASSAY OF MAGNESIUM: CPT | Performed by: EMERGENCY MEDICINE

## 2024-03-05 RX ORDER — CLOPIDOGREL BISULFATE 75 MG/1
300 TABLET ORAL
Status: COMPLETED | OUTPATIENT
Start: 2024-03-05 | End: 2024-03-05

## 2024-03-05 RX ORDER — MAGNESIUM SULFATE HEPTAHYDRATE 40 MG/ML
2 INJECTION, SOLUTION INTRAVENOUS
Status: COMPLETED | OUTPATIENT
Start: 2024-03-05 | End: 2024-03-05

## 2024-03-05 RX ORDER — ASPIRIN 325 MG
325 TABLET ORAL
Status: COMPLETED | OUTPATIENT
Start: 2024-03-05 | End: 2024-03-05

## 2024-03-05 RX ADMIN — ASPIRIN 325 MG ORAL TABLET 325 MG: 325 PILL ORAL at 05:03

## 2024-03-05 RX ADMIN — MAGNESIUM SULFATE HEPTAHYDRATE 2 G: 40 INJECTION, SOLUTION INTRAVENOUS at 06:03

## 2024-03-05 RX ADMIN — SODIUM CHLORIDE, POTASSIUM CHLORIDE, SODIUM LACTATE AND CALCIUM CHLORIDE 1000 ML: 600; 310; 30; 20 INJECTION, SOLUTION INTRAVENOUS at 05:03

## 2024-03-05 RX ADMIN — CLOPIDOGREL BISULFATE 300 MG: 75 TABLET ORAL at 05:03

## 2024-03-05 NOTE — ED NOTES
Pt amb approx 15 ft (moderate assist) without unilateral deficit, after 5 ft pt c/o generalized weakness, fly reports pt gait is normally much stronger, PT uses walker at home only occasionally for occasional weakness - vomiting and irahhea yesterday and the day prior but not today

## 2024-03-05 NOTE — ED PROVIDER NOTES
Encounter Date: 3/5/2024       History     Chief Complaint   Patient presents with    Weakness     Pt reports that she is having left sided weakness and heaviness. Reports that she might have taken doubled her marijuana drop dosage on accident today. Denies any pain, hitting head, or LOC.      Patient is an 89-year-old female who presents with left-sided numbness/weakness.  She stated at 3:30 pm, she stood up and she felt lightheaded and noticed that the left side of her body was weak and numb.  She would difficulty ambulating.  She said that she realized she accidentally took 2 of her marijuana pills instead of just 1.  She takes this for arthritis.  She is also reporting left-sided facial numbness, left tongue numbness, and some difficulty speaking.  No right-sided symptoms.  No reports of chest pain, dyspnea, headache, syncope.      Review of patient's allergies indicates:   Allergen Reactions    Ace inhibitors Other (See Comments)    Amoxicillin-pot clavulanate Other (See Comments)    Celecoxib Other (See Comments)    Codeine     Hydrocodone-acetaminophen     Irbesartan Other (See Comments)    Penicillins      Past Medical History:   Diagnosis Date    Anemia of chronic illness     Anxiety and depression     CAD (coronary artery disease)     Chronic combined systolic and diastolic CHF (congestive heart failure)     CKD (chronic kidney disease) stage 4, GFR 15-29 ml/min     Colon polyp     Dementia     Diabetic polyneuropathy associated with type 2 diabetes mellitus 08/31/2023 August 17, 2023:  EMG nerve conduction study showed generalized mixed sensory-motor polyneuropathy affecting the upper and lower extremities consistent with diabetes.    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     Microhematuria     Osteoporosis     Restless leg syndrome     Type 2 diabetes mellitus  with cardiac complication     Urge incontinence      Past Surgical History:   Procedure Laterality Date    CARDIAC CATHETERIZATION  07/24/2008    CHOLECYSTECTOMY      COLONOSCOPY      CYSTOCELE REPAIR  2001    EPIDURAL STEROID INJECTION  06/2020    ESOPHAGOGASTRODUODENOSCOPY (EGD) WITH DILATION  05/14/2021    HEMORRHOID SURGERY      HYSTERECTOMY      MASTECTOMY  1995    PERCUTANEOUS TRANSLUMINAL BALLOON ANGIOPLASTY OF CORONARY ARTERY  08/01/2017    PERIPHERAL ARTERIAL STENT GRAFT  09/12/2018     Family History   Problem Relation Age of Onset    Breast cancer Mother     Heart disease Father     Epilepsy Father      Social History     Tobacco Use    Smoking status: Former    Smokeless tobacco: Never   Substance Use Topics    Alcohol use: Not Currently    Drug use: Yes     Types: Other-see comments     Comment: CBD Drops and lotion     Review of Systems   Neurological:  Positive for weakness, light-headedness and numbness.   All other systems reviewed and are negative.      Physical Exam     Initial Vitals [03/05/24 1611]   BP Pulse Resp Temp SpO2   (!) 172/88 80 20 98.1 °F (36.7 °C) 100 %      MAP       --         Physical Exam    Nursing note and vitals reviewed.  Constitutional: She appears well-developed and well-nourished. No distress.   HENT:   Head: Normocephalic and atraumatic.   Eyes: Conjunctivae and EOM are normal. Pupils are equal, round, and reactive to light.   Neck: Neck supple. No tracheal deviation present.   Cardiovascular:  Normal rate, regular rhythm, normal heart sounds and intact distal pulses.           Pulmonary/Chest: Breath sounds normal. No respiratory distress.   Abdominal: Abdomen is soft. She exhibits no distension. There is no abdominal tenderness. There is no rebound and no guarding.   Musculoskeletal:         General: No tenderness or edema. Normal range of motion.      Cervical back: Neck supple.     Neurological: She is alert and oriented to person, place, and time. GCS score is 15.  GCS eye subscore is 4. GCS verbal subscore is 5. GCS motor subscore is 6.   See NIH stroke scale below   Skin: Skin is warm. No rash noted. No erythema.   Psychiatric: She has a normal mood and affect. Her behavior is normal.         ED Course   Critical Care    Date/Time: 3/5/2024 4:06 PM    Performed by: Vipin Kramer MD  Authorized by: Vipin Kramer MD  Direct patient critical care time: 15 minutes  Additional history critical care time: 5 minutes  Ordering / reviewing critical care time: 5 minutes  Documentation critical care time: 5 minutes  Consulting other physicians critical care time: 5 minutes  Total critical care time (exclusive of procedural time) : 35 minutes  Critical care time was exclusive of separately billable procedures and treating other patients and teaching time.  Critical care was necessary to treat or prevent imminent or life-threatening deterioration of the following conditions: CVA with consideration for thrombolytics.  Critical care was time spent personally by me on the following activities: blood draw for specimens, development of treatment plan with patient or surrogate, discussions with consultants, interpretation of cardiac output measurements, evaluation of patient's response to treatment, examination of patient, obtaining history from patient or surrogate, ordering and performing treatments and interventions, ordering and review of laboratory studies, ordering and review of radiographic studies, pulse oximetry, re-evaluation of patient's condition and review of old charts.        Labs Reviewed   COMPREHENSIVE METABOLIC PANEL - Abnormal; Notable for the following components:       Result Value    Potassium Level 3.4 (*)     Glucose Level 150 (*)     Blood Urea Nitrogen 25.0 (*)     Creatinine 1.47 (*)     Alkaline Phosphatase 41 (*)     All other components within normal limits   CBC WITH DIFFERENTIAL - Abnormal; Notable for the following components:    Hgb 11.5 (*)     Hct 35.2  (*)     Mono # 0.61 (*)     All other components within normal limits   MAGNESIUM - Abnormal; Notable for the following components:    Magnesium Level 1.10 (*)     All other components within normal limits   POCT GLUCOSE - Abnormal; Notable for the following components:    POCT Glucose 140 (*)     All other components within normal limits   PROTIME-INR - Normal    Narrative:     Protimes are used to monitor anticoagulant agents such as warfarin. PT INR values are based on the current patient normal mean and the MARÍA value for the specific instrument reagent used.  **Routine theraputic target values for the INR are 2.0-3.0**   TSH - Normal   APTT - Normal   CBC W/ AUTO DIFFERENTIAL    Narrative:     The following orders were created for panel order CBC W/ AUTO DIFFERENTIAL.  Procedure                               Abnormality         Status                     ---------                               -----------         ------                     CBC with Differential[[id number]]       Abnormal            Final result                 Please view results for these tests on the individual orders.   POCT GLUCOSE, HAND-HELD DEVICE   POCT GLUCOSE, HAND-HELD DEVICE     Results for orders placed or performed during the hospital encounter of 03/05/24   Comprehensive metabolic panel   Result Value Ref Range    Sodium Level 140 135 - 145 mmol/L    Potassium Level 3.4 (L) 3.5 - 5.1 mmol/L    Chloride 104 98 - 110 mmol/L    Carbon Dioxide 28 21 - 32 mmol/L    Glucose Level 150 (H) 70 - 115 mg/dL    Blood Urea Nitrogen 25.0 (H) 7.0 - 20.0 mg/dL    Creatinine 1.47 (H) 0.66 - 1.25 mg/dL    Calcium Level Total 9.2 8.4 - 10.2 mg/dL    Protein Total 7.8 6.3 - 8.2 gm/dL    Albumin Level 4.7 3.4 - 5.0 g/dL    Globulin 3.1 2.0 - 3.9 gm/dL    Albumin/Globulin Ratio 1.5 ratio    Bilirubin Total 0.3 0.0 - 1.0 mg/dL    Alkaline Phosphatase 41 (L) 50 - 144 unit/L    Alanine Aminotransferase 22 1 - 45 unit/L    Aspartate Aminotransferase 34 14 -  36 unit/L    eGFR 34 mls/min/1.73/m2    Anion Gap 8.0 2.0 - 13.0 mEq/L    BUN/Creatinine Ratio 17 12 - 20   Protime-INR   Result Value Ref Range    PT 11.0 9.3 - 11.9 seconds    INR 1.1 <5.0   TSH   Result Value Ref Range    TSH 2.310 0.360 - 3.740 uIU/mL   CBC with Differential   Result Value Ref Range    WBC 8.74 4.00 - 11.50 x10(3)/mcL    RBC 4.01 4.00 - 5.10 x10(6)/mcL    Hgb 11.5 (L) 11.8 - 16.0 g/dL    Hct 35.2 (L) 36.0 - 48.0 %    MCV 87.8 79.0 - 99.0 fL    MCH 28.7 27.0 - 34.0 pg    MCHC 32.7 31.0 - 37.0 g/dL    RDW 12.8 11.0 - 14.5 %    Platelet 249 140 - 371 x10(3)/mcL    MPV 9.8 9.4 - 12.4 fL    Neut % 60.1 37 - 73 %    Lymph % 29.3 20 - 55 %    Mono % 7.0 4.7 - 12.5 %    Eos % 3.1 0.7 - 7 %    Basophil % 0.3 0.1 - 1.2 %    Lymph # 2.56 1.16 - 3.74 x10(3)/mcL    Neut # 5.25 1.56 - 6.13 x10(3)/mcL    Mono # 0.61 (H) 0.24 - 0.36 x10(3)/mcL    Eos # 0.27 0.04 - 0.36 x10(3)/mcL    Baso # 0.03 0.01 - 0.08 x10(3)/mcL    IG# 0.02 0.0001 - 0.031 x10(3)/mcL    IG% 0.2 0 - 0.5 %    NRBC% 0.0 <=1 %   APTT   Result Value Ref Range    PTT 25.9 23.0 - 29.4 seconds   Magnesium   Result Value Ref Range    Magnesium Level 1.10 (L) 1.80 - 2.40 mg/dL   POCT glucose   Result Value Ref Range    POCT Glucose 140 (H) 70 - 110 mg/dL            Imaging Results              CT Head Without Contrast (Final result)  Result time 03/05/24 16:46:13      Final result by Albert Pardo MD (03/05/24 16:46:13)                   Impression:        1. Chronic findings of mild to moderate atrophy and mild chronic ischemic disease involving the periventricular deep white matter on both sides.  No acute intracranial abnormality.  The findings were communicated to the ER physician Dr. Kramer at 16:43 on 03/05/2024.    n/a    Category: n/a    The following dose reduction techniques are used for all CT at [facility]:    1.   Automated exposure control.    2.   Adjustment of the mA and/or kV according to patient size.    3.    Use of iterative reconstruction technique.      Electronically signed by: Albert Pardo  Date:    03/05/2024  Time:    16:46               Narrative:    EXAMINATION:  CT HEAD WITHOUT CONTRAST    CLINICAL HISTORY:  Neuro deficit, acute, stroke suspected;    TECHNIQUE:  Low dose axial images were obtained through the head.  Coronal and sagittal reformations were also performed. Contrast was not administered.    COMPARISON:  02/06/2023    FINDINGS:  Multiplanar imaging through the head/brain was performed.Mild to moderate generalized atrophy is present with mild decreased attenuation involving the periventricular deep white matter on both sides compatible with mild chronic ischemic disease.  I see no intraparynchymal masses, hemorhagic lesions, or dominant wedge shaped infarcts. I see no extraxial masses or abnormal fluid collections.                                       Medications   lactated ringers bolus 1,000 mL (has no administration in time range)   magnesium sulfate 2g in water 50mL IVPB (premix) (has no administration in time range)   aspirin tablet 325 mg (325 mg Oral Given 3/5/24 1753)   clopidogreL tablet 300 mg (300 mg Oral Given 3/5/24 1753)     Medical Decision Making  89-year-old female who presented with complaints of lightheadedness, weakness, and predominant left sided weakness/numbness.  All concerning for CVA.  Code stroke was called.  Patient was inside the thrombolytic window.  CT head showed no intracranial hemorrhage.  Patient had no thrombolytic contraindications.  Neurologist Dr. Castaneda asked that we tried to ambulate patient.  If she is unable to walk due to the left lower extremity weakness, her recommendation was thrombolytics.  We tried to ambulate patient.  She felt very lightheaded and weak.  She is now telling us that she had vomiting and diarrhea for the past 3 days.  She had been lying in bed week with the vomiting and diarrhea.  She is also concerned that she may have took an too  much marijuana.  Family at bedside is also now saying that her left side is generally a little weaker than her right side to begin with.  Patient is unsteady on her feet.  She attributes it to leg weakness, but is unable to decipher if it is bilateral or mainly left lower extremity.  Discussed the remaining possibility of a CVA in the need for an MRI to confirm.  I discussed all risks, benefits, and alternatives to thrombolytics.  I discussed neurologist recommendation.  Discussed this with both patient and multiple family members including the POA.  Ultimately patient decided against thrombolytics.  Notified Neurology and asked if patient needed a stat CTA given her chronic kidney disease.  Recommendation from Dr. Castaneda was to load with dual antiplatelet therapy and obtain an MRI/MRA due to her kidney function.  Aspirin and Plavix ordered.  IV fluids ordered.  Magnesium added on with new reports of diarrhea.  Magnesium came back low.  IV magnesium ordered.  Will plan for transfer since we do not have inpatient neurology services    6:00 PM care handed off to oncoming physician Dr. Almaraz, pending acceptance from outside facility.  Allowing for permissive hypertension at this time    Amount and/or Complexity of Data Reviewed  Independent Historian:      Details: Discussed case with multiple family members.  They provided the history that patient has left lower extremity is normally weaker than her right  External Data Reviewed: labs, radiology and notes.  Labs: ordered. Decision-making details documented in ED Course.  Radiology: ordered and independent interpretation performed. Decision-making details documented in ED Course.  ECG/medicine tests: ordered and independent interpretation performed. Decision-making details documented in ED Course.     Details: EKG reviewed interpreted by ED provider as normal sinus rhythm with a rate of 74, normal axis, left bundle-branch block pattern, no STEMI criteria    Risk  OTC  drugs.  Prescription drug management.  Drug therapy requiring intensive monitoring for toxicity.  Decision regarding hospitalization.      Additional MDM:     NIH Stroke Scale:   Interval = baseline (upon arrival/admit)  Level of consciousness = 0 - alert  LOC questions = 0 - answers both correctly  LOC commands = 0 - performs both correctly  Best gaze = 0 - normal  Visual = 0 - no visual loss  Facial palsy = 0 - normal  Motor left arm =  0 - no drift  Motor right arm =  0 - no drift  Motor left leg = 1 - drift  Motor right leg =  0 - no drift  Limb ataxia = 0 - absent  Sensory = 1 - mild to moderate loss  Best language = 0 - no aphasia  Dysarthria = 1 - mild to moderate dysarthria  Extinction and inattention = 0 - no neglect  NIH Stroke Scale Total = 3              ED Course as of 03/05/24 1755   Tue Mar 05, 2024   1731 Long conversation with patient and multiple family members including her power of  about thrombolytics.  Discussed all risks, benefits, and alternatives inpatient is declining thrombolytics.  She has decision-making capacity [DP]      ED Course User Index  [DP] Vipin Kramer MD                           Clinical Impression:  Final diagnoses:  [R29.818] Acute focal neurological deficit  [I63.9] Cerebrovascular accident (CVA), unspecified mechanism (Primary)  [R11.2, R19.7] Nausea vomiting and diarrhea  [N18.9] Chronic kidney disease, unspecified CKD stage  [E83.42] Hypomagnesemia          ED Disposition Condition    Transfer to Another Facility Stable                Vipin Kramer MD  03/05/24 4632

## 2024-03-05 NOTE — TELEMEDICINE CONSULT
[facility]  Vascular Neurology  Comprehensive Stroke Center  TeleVascular Neurology Acute Consultation Note        Consult Information  Consult to Telemedicine - Acute Stroke  Consult performed by: Nayla Castaneda MD  Consult ordered by: Vipin Go MD          Consulting Provider: VIPIN GO   Current Providers  No providers found    Patient Location:  [facility] EMERGENCY DEPARTMENT Emergency Department    Spoke hospital nurse at bedside with patient assisting consultant.  Patient information was obtained from patient, relative(s), and past medical records.       Stroke Documentation  Acute Stroke Times   Last Known Normal Date: 03/05/24  Last Known Normal Time: 1500  Symptom Onset Date: 03/05/24  Unknown Symptom Onset Time: Unknown Time  Stroke Team Called Date: 03/05/24  Stroke Team Called Time: 1631  Stroke Team Arrival Date: 03/05/24  Stroke Team Arrival Time: 1641  CT Interpretation Time: 1645    NIH Scale:  1a. Level of Consciousness: 0-->Alert, keenly responsive  1b. LOC Questions: 0-->Answers both questions correctly  1c. LOC Commands: 0-->Performs both tasks correctly  2. Best Gaze: 0-->Normal  3. Visual: 0-->No visual loss  4. Facial Palsy: 0-->Normal symmetrical movements  5a. Motor Arm, Left: 0-->No drift, limb holds 90 (or 45) degrees for full 10 secs  5b. Motor Arm, Right: 0-->No drift, limb holds 90 (or 45) degrees for full 10 secs  6a. Motor Leg, Left: 1-->Drift, leg falls by the end of the 5-sec period but does not hit bed  6b. Motor Leg, Right: 0-->No drift, leg holds 30 degree position for full 5 secs  7. Limb Ataxia: 0-->Absent  8. Sensory: 1-->Mild-to-moderate sensory loss, patient feels pinprick is less sharp or is dull on the affected side, or there is a loss of superficial pain with pinprick, but patient is aware of being touched  9. Best Language: 0-->No aphasia, normal  10. Dysarthria: 0-->Normal  11. Extinction and Inattention (formerly Neglect): 0-->No  "abnormality  Total (NIH Stroke Scale): 2      Modified [place]: Score: 4  [place] Coma Scale:     ABCD2 Score:    [id number] Score:    HAS -BLED Score:    ICH Score:    Hunt & Huerta Classification:      Blood pressure (!) 146/76, pulse 80, temperature 98.1 °F (36.7 °C), temperature source Oral, resp. rate 16, height 5' 3" (1.6 m), weight 50.3 kg (111 lb), SpO2 98 %.    Van Negative    Medical Decision Making  HPI:  89 y.o. female w/ PMHX of CKD, DM II, MDD, CHF, PVD, HLD, CAD who presents w/ reported acute onset LSW and numbness.   States that when she got up from the chair, her "leg wasn't listening" and her tongue felt numb.   Denies previous episodes such as this.  Thinks symptoms are improving.      Images personally reviewed and interpreted:  Study: Head CT  Study Interpretation: No acute intracranial abnormalities     Assessment and plan:  89 y.o. female w/ PMHX of CKD, DM II, MDD, CHF, PVD, HLD, CAD who presents w/ reported acute onset LSW and numbness.    NIHSS 2.     Patient w/ improving symptoms. Discussed both lytic administration and DAPT load  Will have the RN walk the patient, and if the ambulation is still impaired, would proceed w/ TNK administration as patient within the window and her symptoms would be considered debilitating.     Please call with any questions or concerns.     If No TNK recommend the following  Recommendations:  CTA Head & Neck ASAP to evaluate cervico-cephalic vasculature for high risk culprit vessel disease or large/medium vessel occlusion  MRI brain to evaluate for presence and/or extent of ischemic injury  Allow for permissive HTNsion up to 220/110 until AIS is r/o w/ imaging   Lipid profile, A1c, TSH  ECHO w/o bubble study  PT/OT/SLP eval and Rx  IVF to allow for maximum cerebral perfusion  HOB flat   Load aspirin 325 mg & clopidogrel 300 mg x 1 now, followed by daily aspirin 81 mg /  clopidogrel 75 mg x 30 days followed by monotherapy thereafter  High intensity statin for " LDL goal <70 long term     Recommendations discussed w/ Dr. Nia Hardin recommendation: Pending walking assessment.   Thrombectomy recommendation: Awaiting CTA results from Spoke for determination   Placement recommendation: pending further studies  admit to inpatient           LLE minor drift and sensory deficit  No facial droop  No sensory deficit present elsewhere anymore     Past Medical History:   Diagnosis Date    Anemia of chronic illness     Anxiety and depression     CAD (coronary artery disease)     Chronic combined systolic and diastolic CHF (congestive heart failure)     CKD (chronic kidney disease) stage 4, GFR 15-29 ml/min     Colon polyp     Dementia     Diabetic polyneuropathy associated with type 2 diabetes mellitus 08/31/2023 August 17, 2023:  EMG nerve conduction study showed generalized mixed sensory-motor polyneuropathy affecting the upper and lower extremities consistent with diabetes.    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     Urge incontinence      Past Surgical History:   Procedure Laterality Date    CARDIAC CATHETERIZATION  07/24/2008    CHOLECYSTECTOMY      COLONOSCOPY      CYSTOCELE REPAIR  2001    EPIDURAL STEROID INJECTION  06/2020    ESOPHAGOGASTRODUODENOSCOPY (EGD) WITH DILATION  05/14/2021    HEMORRHOID SURGERY      HYSTERECTOMY      MASTECTOMY  1995    PERCUTANEOUS TRANSLUMINAL BALLOON ANGIOPLASTY OF CORONARY ARTERY  08/01/2017    PERIPHERAL ARTERIAL STENT GRAFT  09/12/2018     Family History   Problem Relation Age of Onset    Breast cancer Mother     Heart disease Father     Epilepsy Father        Diagnoses  No problems updated.    Nayla Castaneda MD      Emergent/Acute neurological consultation requested by spoke provider  due to critical concerns for possible cerebrovascular event that could result in permanent loss of neurologic/bodily function, severe disability or death of this patient.  Immediate/timely evaluation by a highly prepared expert is paramount for optimal outcomes  High risk for neurological deterioration if not properly diagnosed  High risk for neurological deterioration if not treated promplty/as soon as possible  Complex diagnostic evaluation may be required (advanced imaging)  High risk treatment options (thrombolytics and/or thrombectomy)    Patient care was coordinated with spoke provider, including but not limted to    Discussing likely diagnosis/etiology of symptoms  Making recommendations for further diagnostic studies  Making recommendations for intravenous thrombolytics or other advanced therapies  Making recommendations for disposition (admission/transfer for higher level of care)

## 2024-03-05 NOTE — SUBJECTIVE & OBJECTIVE
"HPI:  89 y.o. female w/ PMHX of CKD, DM II, MDD, CHF, PVD, HLD, CAD who presents w/ reported acute onset LSW and numbness.   States that when she got up from the chair, her "leg wasn't listening" and her tongue felt numb.   Denies previous episodes such as this.  Thinks symptoms are improving.      Images personally reviewed and interpreted:  Study: Head CT  Study Interpretation: No acute intracranial abnormalities     Assessment and plan:  89 y.o. female w/ PMHX of CKD, DM II, MDD, CHF, PVD, HLD, CAD who presents w/ reported acute onset LSW and numbness.    NIHSS 2.     Patient w/ improving symptoms. Discussed both lytic administration and DAPT load  Will have the RN walk the patient, and if the ambulation is still impaired, would proceed w/ TNK administration as patient within the window and her symptoms would be considered debilitating.     Please call with any questions or concerns.     If No TNK recommend the following  Recommendations:  CTA Head & Neck ASAP to evaluate cervico-cephalic vasculature for high risk culprit vessel disease or large/medium vessel occlusion  MRI brain to evaluate for presence and/or extent of ischemic injury  Allow for permissive HTNsion up to 220/110 until AIS is r/o w/ imaging   Lipid profile, A1c, TSH  ECHO w/o bubble study  PT/OT/SLP eval and Rx  IVF to allow for maximum cerebral perfusion  HOB flat   Load aspirin 325 mg & clopidogrel 300 mg x 1 now, followed by daily aspirin 81 mg /  clopidogrel 75 mg x 30 days followed by monotherapy thereafter  High intensity statin for LDL goal <70 long term     Recommendations discussed w/ Dr. Nia Hardin recommendation: Pending walking assessment.   Thrombectomy recommendation: Awaiting CTA results from Spoke for determination   Placement recommendation: pending further studies  admit to inpatient             "

## 2024-03-05 NOTE — TELEPHONE ENCOUNTER
Returned pt call and left v/m that I seen she wants to r/s her OV today 3/5/24 with ABRIL. I explained that her next avail is not until 2025. Pending call back. richard

## 2024-03-05 NOTE — ED NOTES
DESIRAE at bedside - speaking with daughter at bedside, Daughter who is Medical power of  is on phone with PT and DESIRAE

## 2024-03-05 NOTE — TELEPHONE ENCOUNTER
----- Message from Cira Villela sent at 3/5/2024  8:58 AM CST -----  Contact: Arleth santizo to pt  Type: Staff Message    Caller: Arleth santizo to pt  Call Back Number: [phone]  Nature of the Call: pt needing to reschedule but there are no available apt showing on [employer]  Additional Information: na

## 2024-03-06 LAB
OHS QRS DURATION: 130 MS
OHS QTC CALCULATION: 501 MS

## 2024-03-06 NOTE — ED NOTES
PT AMBULATED TO RESTROOM WITH ASSISTANCE, DENIES COMPLAINT AT THIS TIME. AWAITING TRANSPORT, FAMILY AND PT UPDATED.

## 2024-03-06 NOTE — ED PROVIDER NOTES
Encounter Date: 3/5/2024       History     Chief Complaint   Patient presents with    Weakness     Pt reports that she is having left sided weakness and heaviness. Reports that she might have taken doubled her marijuana drop dosage on accident today. Denies any pain, hitting head, or LOC.      HPI  Review of patient's allergies indicates:   Allergen Reactions    Ace inhibitors Other (See Comments)    Amoxicillin-pot clavulanate Other (See Comments)    Celecoxib Other (See Comments)    Codeine     Hydrocodone-acetaminophen     Irbesartan Other (See Comments)    Penicillins      Past Medical History:   Diagnosis Date    Anemia of chronic illness     Anxiety and depression     CAD (coronary artery disease)     Chronic combined systolic and diastolic CHF (congestive heart failure)     CKD (chronic kidney disease) stage 4, GFR 15-29 ml/min     Colon polyp     Dementia     Diabetic polyneuropathy associated with type 2 diabetes mellitus 08/31/2023 August 17, 2023:  EMG nerve conduction study showed generalized mixed sensory-motor polyneuropathy affecting the upper and lower extremities consistent with diabetes.    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     Urge incontinence      Past Surgical History:   Procedure Laterality Date    CARDIAC CATHETERIZATION  07/24/2008    CHOLECYSTECTOMY      COLONOSCOPY      CYSTOCELE REPAIR  2001    EPIDURAL STEROID INJECTION  06/2020    ESOPHAGOGASTRODUODENOSCOPY (EGD) WITH DILATION  05/14/2021    HEMORRHOID SURGERY      HYSTERECTOMY      MASTECTOMY  1995    PERCUTANEOUS TRANSLUMINAL BALLOON ANGIOPLASTY OF CORONARY ARTERY  08/01/2017    PERIPHERAL ARTERIAL STENT GRAFT  09/12/2018     Family History   Problem Relation Age of Onset     Breast cancer Mother     Heart disease Father     Epilepsy Father      Social History     Tobacco Use    Smoking status: Former    Smokeless tobacco: Never   Substance Use Topics    Alcohol use: Not Currently    Drug use: Yes     Types: Other-see comments     Comment: CBD Drops and lotion     Review of Systems    Physical Exam     Initial Vitals [03/05/24 1611]   BP Pulse Resp Temp SpO2   (!) 172/88 80 20 98.1 °F (36.7 °C) 100 %      MAP       --         Physical Exam    ED Course   Procedures  Labs Reviewed   COMPREHENSIVE METABOLIC PANEL - Abnormal; Notable for the following components:       Result Value    Potassium Level 3.4 (*)     Glucose Level 150 (*)     Blood Urea Nitrogen 25.0 (*)     Creatinine 1.47 (*)     Alkaline Phosphatase 41 (*)     All other components within normal limits   CBC WITH DIFFERENTIAL - Abnormal; Notable for the following components:    Hgb 11.5 (*)     Hct 35.2 (*)     Mono # 0.61 (*)     All other components within normal limits   MAGNESIUM - Abnormal; Notable for the following components:    Magnesium Level 1.10 (*)     All other components within normal limits   POCT GLUCOSE - Abnormal; Notable for the following components:    POCT Glucose 140 (*)     All other components within normal limits   PROTIME-INR - Normal    Narrative:     Protimes are used to monitor anticoagulant agents such as warfarin. PT INR values are based on the current patient normal mean and the MARÍA value for the specific instrument reagent used.  **Routine theraputic target values for the INR are 2.0-3.0**   TSH - Normal   APTT - Normal   CBC W/ AUTO DIFFERENTIAL    Narrative:     The following orders were created for panel order CBC W/ AUTO DIFFERENTIAL.  Procedure                               Abnormality         Status                     ---------                               -----------         ------                     CBC with Differential[[id number]]       Abnormal            Final result                  Please view results for these tests on the individual orders.   POCT GLUCOSE, HAND-HELD DEVICE   POCT GLUCOSE, HAND-HELD DEVICE          Imaging Results              CT Head Without Contrast (Final result)  Result time 03/05/24 16:46:13      Final result by Albert Pardo MD (03/05/24 16:46:13)                   Impression:        1. Chronic findings of mild to moderate atrophy and mild chronic ischemic disease involving the periventricular deep white matter on both sides.  No acute intracranial abnormality.  The findings were communicated to the ER physician Dr. Kramer at 16:43 on 03/05/2024.    n/a    Category: n/a    The following dose reduction techniques are used for all CT at [facility]:    1.   Automated exposure control.    2.   Adjustment of the mA and/or kV according to patient size.    3.   Use of iterative reconstruction technique.      Electronically signed by: Albert Pardo  Date:    03/05/2024  Time:    16:46               Narrative:    EXAMINATION:  CT HEAD WITHOUT CONTRAST    CLINICAL HISTORY:  Neuro deficit, acute, stroke suspected;    TECHNIQUE:  Low dose axial images were obtained through the head.  Coronal and sagittal reformations were also performed. Contrast was not administered.    COMPARISON:  02/06/2023    FINDINGS:  Multiplanar imaging through the head/brain was performed.Mild to moderate generalized atrophy is present with mild decreased attenuation involving the periventricular deep white matter on both sides compatible with mild chronic ischemic disease.  I see no intraparynchymal masses, hemorhagic lesions, or dominant wedge shaped infarcts. I see no extraxial masses or abnormal fluid collections.                                       Medications   lactated ringers bolus 1,000 mL (1,000 mLs Intravenous New Bag 3/5/24 8986)   magnesium sulfate 2g in water 50mL IVPB (premix) (2 g Intravenous New Bag 3/5/24 0050)   aspirin tablet 325 mg (325 mg Oral Given  "3/5/24 1753)   clopidogreL tablet 300 mg (300 mg Oral Given 3/5/24 1753)     Medical Decision Making  [place] of care note:    Pt is a 89 y.o. female complaining of left sided weakness. Their evaluation was initiated by previous ER physician and turned over to my care at shift change. I have examined the patient and agree with previous documentation.    Discussed with the patient and her family and examined her independently.  Her weakness is significantly improved with what she would say is "5%" left a weakness.  Most of her symptoms did resolve with a bag of IV fluids and time.  Symptoms time of onset being 330 p.m..  Has already been several discussions with the neurologist who recommended thrombolytics, but as the patient's symptoms have significantly resolved she and her family have refused this therapy.  CTA was considered, deferred due to renal function, MRA to be a better test.  We do not have MRI capabilities here nor do we have Neurology, patient accepted at [facility] in transfer.    Negro Almaraz MD  6:43 PM 03/05/2024    Amount and/or Complexity of Data Reviewed  Labs: ordered.  Radiology: ordered.    Risk  OTC drugs.  Prescription drug management.               ED Course as of 03/05/24 1845   Tue Mar 05, 2024   1731 Long conversation with patient and multiple family members including her power of  about thrombolytics.  Discussed all risks, benefits, and alternatives inpatient is declining thrombolytics.  She has decision-making capacity [DP]      ED Course User Index  [DP] Vipin Kramer MD                           Clinical Impression:  Final diagnoses:  [R29.818] Acute focal neurological deficit  [I63.9] Cerebrovascular accident (CVA), unspecified mechanism (Primary)  [R11.2, R19.7] Nausea vomiting and diarrhea  [N18.9] Chronic kidney disease, unspecified CKD stage  [E83.42] Hypomagnesemia          ED Disposition Condition    Transfer to Another Facility Stable      "           Negro Almaraz MD  03/05/24 2610

## 2024-03-06 NOTE — ED NOTES
[facility] AMBULANCE DISPATCH ESTIMATES WAIT TIME AT ANOTHER 45MIN TO 1HR DUE TO EXCESSIVE CALL VOLUME. FAMILY UPDATED.

## 2024-03-12 ENCOUNTER — PATIENT OUTREACH (OUTPATIENT)
Dept: ADMINISTRATIVE | Facility: CLINIC | Age: 89
End: 2024-03-12
Payer: MEDICARE

## 2024-03-12 NOTE — PROGRESS NOTES
C3 nurse spoke with Leatha Adames's daughter, Lexie, for a TCC post hospital discharge follow up call. The patient has a scheduled HOSFU appointment with Dennys Linares MD (PCP) on 3/14/24 @ 1:30pm. The patient will also see Dr. Restrepo tomorrow 3/13/24.     Per Lexie, the patient was put on Plavix 75mg once daily (for 30 days). All other medications reconciled in medication list, but Plavix was not listed.

## 2024-03-14 ENCOUNTER — OFFICE VISIT (OUTPATIENT)
Dept: FAMILY MEDICINE | Facility: CLINIC | Age: 89
End: 2024-03-14
Payer: MEDICARE

## 2024-03-14 VITALS
TEMPERATURE: 98 F | HEIGHT: 63 IN | DIASTOLIC BLOOD PRESSURE: 82 MMHG | BODY MASS INDEX: 20.62 KG/M2 | HEART RATE: 85 BPM | SYSTOLIC BLOOD PRESSURE: 138 MMHG | OXYGEN SATURATION: 99 % | WEIGHT: 116.38 LBS

## 2024-03-14 DIAGNOSIS — I50.32 CHRONIC DIASTOLIC (CONGESTIVE) HEART FAILURE: ICD-10-CM

## 2024-03-14 DIAGNOSIS — I63.511 CEREBROVASCULAR ACCIDENT (CVA) DUE TO OCCLUSION OF RIGHT MIDDLE CEREBRAL ARTERY: Primary | ICD-10-CM

## 2024-03-14 DIAGNOSIS — N18.4 CHRONIC KIDNEY DISEASE, STAGE IV (SEVERE): ICD-10-CM

## 2024-03-14 DIAGNOSIS — I13.10 HYPERTENSIVE HEART AND RENAL DISEASE WITH RENAL FAILURE, STAGE 1 THROUGH STAGE 4 OR UNSPECIFIED CHRONIC KIDNEY DISEASE, WITHOUT HEART FAILURE: ICD-10-CM

## 2024-03-14 DIAGNOSIS — N18.4 ANEMIA DUE TO STAGE 4 CHRONIC KIDNEY DISEASE TREATED WITH ERYTHROPOIETIN: ICD-10-CM

## 2024-03-14 DIAGNOSIS — D63.1 ANEMIA DUE TO STAGE 4 CHRONIC KIDNEY DISEASE TREATED WITH ERYTHROPOIETIN: ICD-10-CM

## 2024-03-14 LAB
ANION GAP SERPL CALC-SCNC: 6 MEQ/L (ref 2–13)
APPEARANCE UR: CLEAR
BACTERIA #/AREA URNS AUTO: ABNORMAL /HPF
BASOPHILS # BLD AUTO: 0.04 X10(3)/MCL (ref 0.01–0.08)
BASOPHILS NFR BLD AUTO: 0.5 % (ref 0.1–1.2)
BILIRUB UR QL STRIP.AUTO: NEGATIVE
BUN SERPL-MCNC: 24 MG/DL (ref 7–20)
CALCIUM SERPL-MCNC: 9.3 MG/DL (ref 8.4–10.2)
CHLORIDE SERPL-SCNC: 104 MMOL/L (ref 98–110)
CO2 SERPL-SCNC: 29 MMOL/L (ref 21–32)
COLOR UR AUTO: YELLOW
CREAT SERPL-MCNC: 1.29 MG/DL (ref 0.66–1.25)
CREAT UR-MCNC: 139 MG/DL
CREAT/UREA NIT SERPL: 19 (ref 12–20)
EOSINOPHIL # BLD AUTO: 0.25 X10(3)/MCL (ref 0.04–0.36)
EOSINOPHIL NFR BLD AUTO: 3.4 % (ref 0.7–7)
ERYTHROCYTE [DISTWIDTH] IN BLOOD BY AUTOMATED COUNT: 12.7 % (ref 11–14.5)
GFR SERPLBLD CREATININE-BSD FMLA CKD-EPI: 40 MLS/MIN/1.73/M2
GLUCOSE SERPL-MCNC: 205 MG/DL (ref 70–115)
GLUCOSE UR QL STRIP.AUTO: NEGATIVE
HCT VFR BLD AUTO: 32.9 % (ref 36–48)
HGB BLD-MCNC: 10.8 G/DL (ref 11.8–16)
IMM GRANULOCYTES # BLD AUTO: 0.03 X10(3)/MCL (ref 0–0.03)
IMM GRANULOCYTES NFR BLD AUTO: 0.4 % (ref 0–0.5)
KETONES UR QL STRIP.AUTO: NEGATIVE
LEUKOCYTE ESTERASE UR QL STRIP.AUTO: NEGATIVE
LYMPHOCYTES # BLD AUTO: 1.8 X10(3)/MCL (ref 1.16–3.74)
LYMPHOCYTES NFR BLD AUTO: 24.5 % (ref 20–55)
MCH RBC QN AUTO: 28.3 PG (ref 27–34)
MCHC RBC AUTO-ENTMCNC: 32.8 G/DL (ref 31–37)
MCV RBC AUTO: 86.4 FL (ref 79–99)
MONOCYTES # BLD AUTO: 0.41 X10(3)/MCL (ref 0.24–0.36)
MONOCYTES NFR BLD AUTO: 5.6 % (ref 4.7–12.5)
NEUTROPHILS # BLD AUTO: 4.83 X10(3)/MCL (ref 1.56–6.13)
NEUTROPHILS NFR BLD AUTO: 65.6 % (ref 37–73)
NITRITE UR QL STRIP.AUTO: NEGATIVE
NRBC BLD AUTO-RTO: 0 %
PH UR STRIP.AUTO: 6 [PH]
PLATELET # BLD AUTO: 233 X10(3)/MCL (ref 140–371)
PMV BLD AUTO: 10.4 FL (ref 9.4–12.4)
POTASSIUM SERPL-SCNC: 4.1 MMOL/L (ref 3.5–5.1)
PROT UR QL STRIP.AUTO: 100
PROT UR STRIP-MCNC: 105 MG/DL
RBC # BLD AUTO: 3.81 X10(6)/MCL (ref 4–5.1)
RBC UR QL AUTO: NEGATIVE
SODIUM SERPL-SCNC: 139 MMOL/L (ref 135–145)
SP GR UR STRIP.AUTO: 1.02 (ref 1–1.03)
SQUAMOUS #/AREA URNS AUTO: ABNORMAL /HPF
URINE PROTEIN/CREATININE RATIO (OLG): 0.8
UROBILINOGEN UR STRIP-ACNC: 0.2
WBC # SPEC AUTO: 7.36 X10(3)/MCL (ref 4–11.5)
WBC #/AREA URNS AUTO: ABNORMAL /HPF

## 2024-03-14 PROCEDURE — 87086 URINE CULTURE/COLONY COUNT: CPT | Performed by: INTERNAL MEDICINE

## 2024-03-14 PROCEDURE — 80048 BASIC METABOLIC PNL TOTAL CA: CPT | Performed by: INTERNAL MEDICINE

## 2024-03-14 PROCEDURE — 85025 COMPLETE CBC W/AUTO DIFF WBC: CPT | Performed by: INTERNAL MEDICINE

## 2024-03-14 PROCEDURE — 99214 OFFICE O/P EST MOD 30 MIN: CPT | Mod: ,,, | Performed by: FAMILY MEDICINE

## 2024-03-14 PROCEDURE — 81003 URINALYSIS AUTO W/O SCOPE: CPT | Performed by: INTERNAL MEDICINE

## 2024-03-14 RX ORDER — CLOPIDOGREL BISULFATE 75 MG/1
75 TABLET ORAL DAILY
COMMUNITY
Start: 2024-03-07

## 2024-03-14 RX ORDER — FUROSEMIDE 40 MG/1
40 TABLET ORAL DAILY
COMMUNITY
Start: 2024-02-16

## 2024-03-14 NOTE — PROGRESS NOTES
"SUBJECTIVE:  HPI    Leatha Adames is a 89 y.o. female here for Hospital Follow Up after recent CVA with left-sided weakness and paresthesias.  Her paresthesias have persisted but her weakness resolved within 24 hours.  She was transferred to a larger hospital from our local emergency room after refusing tPA therapy.  Workup during the hospital was unremarkable.  She was discharged home on 20/1 days of Plavix.      Since returning home, all of her function has returned but she does continue to have paresthesias in her left arm and left leg.  She had follow up with her cardiologist recently.  She has an upcoming appointment with Nephrology.      Curts allergies, medications, history, and problem list were updated as appropriate.    ROS:  Pertinent ROS as above, otherwise negative    OBJECTIVE:  Vital signs  Visit Vitals  /82 (BP Location: Right arm, Patient Position: Sitting)   Pulse 85   Temp 98 °F (36.7 °C) (Temporal)   Ht 5' 2.99" (1.6 m)   Wt 52.8 kg (116 lb 6.4 oz)   SpO2 99%   BMI 20.62 kg/m²          PHYSICAL EXAM:  General:  Awake, alert, no acute distress  Cardiovascular: Regular rate and rhythm with a 3/6 systolic murmur  Respiratory: Clear to auscultation bilaterally, normal effort  Neuro:  Cranial nerves 2-12 are intact.  Strength is equal and symmetric in bilateral upper and lower extremities      ASSESSMENT/PLAN:  1. Cerebrovascular accident (CVA) due to occlusion of right middle cerebral artery  Overview:  March 2024:  Weakness resolved within 24 hours but persistent paresthesias  Diagnostic evaluation was unremarkable and she was treated with 21 days of Plavix    Assessment & Plan:  Aspirin, statin, blood pressure control     Plavix for 21 days as prescribed      2. Hypertensive heart and kidney disease  -     Basic Metabolic Panel; Future; Expected date: 03/14/2024  -     CBC Auto Differential; Future; Expected date: 03/14/2024  -     Urinalysis  -     Protein/Creatinine Ratio, Urine    3. " Chronic kidney disease, stage IV (severe)  Overview:  Lab Results   Component Value Date    CREATININE 1.78 (H) 12/22/2023    EGFRNONAA 30 03/10/2022    EGFRNONAA 38 04/23/2021       Orders:  -     Basic Metabolic Panel; Future; Expected date: 03/14/2024  -     CBC Auto Differential; Future; Expected date: 03/14/2024  -     Urinalysis  -     Protein/Creatinine Ratio, Urine    4. Chronic diastolic (congestive) heart failure  -     Basic Metabolic Panel; Future; Expected date: 03/14/2024  -     CBC Auto Differential; Future; Expected date: 03/14/2024  -     Urinalysis  -     Protein/Creatinine Ratio, Urine    5. Anemia due to stage 4 chronic kidney disease treated with erythropoietin  Overview:  Lab Results   Component Value Date    WBC 6.62 09/11/2023    HGB 11.6 (L) 09/11/2023    HCT 35.7 (L) 09/11/2023    MCV 90.6 09/11/2023     09/11/2023           Orders:  -     Basic Metabolic Panel; Future; Expected date: 03/14/2024  -     CBC Auto Differential; Future; Expected date: 03/14/2024  -     Urinalysis  -     Protein/Creatinine Ratio, Urine      Labs today for nephrologist     Return to clinic as scheduled in April with lab work

## 2024-03-17 LAB — BACTERIA UR CULT: NO GROWTH

## 2024-03-20 DIAGNOSIS — J31.0 CHRONIC RHINITIS: ICD-10-CM

## 2024-03-20 DIAGNOSIS — M19.90 OSTEOARTHRITIS, UNSPECIFIED OSTEOARTHRITIS TYPE, UNSPECIFIED SITE: Primary | ICD-10-CM

## 2024-03-20 DIAGNOSIS — F33.41 RECURRENT MAJOR DEPRESSIVE DISORDER, IN PARTIAL REMISSION: ICD-10-CM

## 2024-03-20 LAB
LEFT EYE DM RETINOPATHY: POSITIVE
RIGHT EYE DM RETINOPATHY: POSITIVE

## 2024-03-20 RX ORDER — TRAMADOL HYDROCHLORIDE 50 MG/1
TABLET ORAL
Qty: 30 TABLET | Refills: 0 | Status: SHIPPED | OUTPATIENT
Start: 2024-03-20

## 2024-03-20 RX ORDER — AZELASTINE 1 MG/ML
1 SPRAY, METERED NASAL 2 TIMES DAILY
Qty: 90 ML | Refills: 3 | Status: SHIPPED | OUTPATIENT
Start: 2024-03-20

## 2024-03-21 ENCOUNTER — PATIENT OUTREACH (OUTPATIENT)
Dept: ADMINISTRATIVE | Facility: HOSPITAL | Age: 89
End: 2024-03-21
Payer: MEDICARE

## 2024-03-21 NOTE — PROGRESS NOTES
Health Maintenance Topic(s) Outreach Outcomes & Actions Taken:    Eye Exam - Outreach Outcomes & Actions Taken  : Diabetic Eye External Records Uploaded, Care Team & History Updated if Applicable     Additional Notes:

## 2024-03-25 ENCOUNTER — INFUSION (OUTPATIENT)
Dept: INFUSION THERAPY | Facility: HOSPITAL | Age: 89
End: 2024-03-25
Attending: INTERNAL MEDICINE
Payer: MEDICARE

## 2024-03-25 VITALS
SYSTOLIC BLOOD PRESSURE: 129 MMHG | DIASTOLIC BLOOD PRESSURE: 59 MMHG | TEMPERATURE: 98 F | RESPIRATION RATE: 18 BRPM | HEART RATE: 78 BPM | OXYGEN SATURATION: 98 %

## 2024-03-25 DIAGNOSIS — N18.4 ANEMIA DUE TO STAGE 4 CHRONIC KIDNEY DISEASE TREATED WITH ERYTHROPOIETIN: Primary | ICD-10-CM

## 2024-03-25 DIAGNOSIS — D63.1 ANEMIA DUE TO STAGE 4 CHRONIC KIDNEY DISEASE TREATED WITH ERYTHROPOIETIN: Primary | ICD-10-CM

## 2024-03-25 PROCEDURE — 96372 THER/PROPH/DIAG INJ SC/IM: CPT

## 2024-03-25 PROCEDURE — 63600175 PHARM REV CODE 636 W HCPCS: Mod: EC,JG

## 2024-03-25 RX ADMIN — EPOETIN ALFA 20000 UNITS: 20000 SOLUTION INTRAVENOUS; SUBCUTANEOUS at 10:03

## 2024-03-25 NOTE — PLAN OF CARE
Epogen injection given SQ in abdomen, Patient tolerated it well, Instructed patient and her daughter to follow up with her physician as needed, Verbalized understanding, Discharged home in stable condition

## 2024-03-26 ENCOUNTER — TELEPHONE (OUTPATIENT)
Dept: FAMILY MEDICINE | Facility: CLINIC | Age: 89
End: 2024-03-26
Payer: MEDICARE

## 2024-03-26 ENCOUNTER — PATIENT MESSAGE (OUTPATIENT)
Dept: FAMILY MEDICINE | Facility: CLINIC | Age: 89
End: 2024-03-26
Payer: MEDICARE

## 2024-03-26 DIAGNOSIS — E11.59 TYPE 2 DIABETES MELLITUS WITH OTHER CIRCULATORY COMPLICATIONS: Primary | ICD-10-CM

## 2024-04-01 ENCOUNTER — TELEPHONE (OUTPATIENT)
Dept: FAMILY MEDICINE | Facility: CLINIC | Age: 89
End: 2024-04-01
Payer: MEDICARE

## 2024-04-09 DIAGNOSIS — K21.9 GASTROESOPHAGEAL REFLUX DISEASE, UNSPECIFIED WHETHER ESOPHAGITIS PRESENT: ICD-10-CM

## 2024-04-09 NOTE — TELEPHONE ENCOUNTER
----- Message from Muna Guzman sent at 4/9/2024 11:21 AM CDT -----  Contact: Tangela/Yoselyn Honeycutt is calling in regards to getting a refill on pantoprazole (PROTONIX) 40 MG tablet.please call back at [phone] ref# [id number]          .  [facility] - [address] [address]  Phone: [phone] Fax: [phone]    Thanks  JULISSA

## 2024-04-11 PROCEDURE — 80061 LIPID PANEL: CPT | Performed by: FAMILY MEDICINE

## 2024-04-11 PROCEDURE — 83036 HEMOGLOBIN GLYCOSYLATED A1C: CPT | Performed by: FAMILY MEDICINE

## 2024-04-11 PROCEDURE — 82728 ASSAY OF FERRITIN: CPT | Performed by: FAMILY MEDICINE

## 2024-04-11 PROCEDURE — 85025 COMPLETE CBC W/AUTO DIFF WBC: CPT | Performed by: FAMILY MEDICINE

## 2024-04-11 PROCEDURE — 82043 UR ALBUMIN QUANTITATIVE: CPT | Performed by: FAMILY MEDICINE

## 2024-04-11 PROCEDURE — 80053 COMPREHEN METABOLIC PANEL: CPT | Performed by: FAMILY MEDICINE

## 2024-04-11 PROCEDURE — 83540 ASSAY OF IRON: CPT | Performed by: FAMILY MEDICINE

## 2024-04-15 RX ORDER — PANTOPRAZOLE SODIUM 40 MG/1
40 TABLET, DELAYED RELEASE ORAL DAILY
Qty: 90 TABLET | Refills: 4 | Status: SHIPPED | OUTPATIENT
Start: 2024-04-15

## 2024-04-18 ENCOUNTER — APPOINTMENT (OUTPATIENT)
Dept: RADIOLOGY | Facility: CLINIC | Age: 89
End: 2024-04-18
Attending: FAMILY MEDICINE
Payer: MEDICARE

## 2024-04-18 ENCOUNTER — OFFICE VISIT (OUTPATIENT)
Dept: FAMILY MEDICINE | Facility: CLINIC | Age: 89
End: 2024-04-18
Payer: MEDICARE

## 2024-04-18 VITALS
WEIGHT: 116 LBS | SYSTOLIC BLOOD PRESSURE: 130 MMHG | DIASTOLIC BLOOD PRESSURE: 62 MMHG | BODY MASS INDEX: 20.55 KG/M2 | HEIGHT: 63 IN | HEART RATE: 87 BPM | TEMPERATURE: 98 F | OXYGEN SATURATION: 99 %

## 2024-04-18 DIAGNOSIS — N18.32 STAGE 3B CHRONIC KIDNEY DISEASE: ICD-10-CM

## 2024-04-18 DIAGNOSIS — D50.0 IRON DEFICIENCY ANEMIA DUE TO CHRONIC BLOOD LOSS: ICD-10-CM

## 2024-04-18 DIAGNOSIS — E11.59 TYPE 2 DIABETES MELLITUS WITH OTHER CIRCULATORY COMPLICATIONS: ICD-10-CM

## 2024-04-18 DIAGNOSIS — M25.531 RIGHT WRIST PAIN: ICD-10-CM

## 2024-04-18 DIAGNOSIS — R94.6 THYROID FUNCTION TEST ABNORMAL: ICD-10-CM

## 2024-04-18 DIAGNOSIS — Z00.01 ENCOUNTER FOR WELL ADULT EXAM WITH ABNORMAL FINDINGS: Primary | ICD-10-CM

## 2024-04-18 DIAGNOSIS — M25.532 LEFT WRIST PAIN: ICD-10-CM

## 2024-04-18 DIAGNOSIS — E78.00 PURE HYPERCHOLESTEROLEMIA: ICD-10-CM

## 2024-04-18 DIAGNOSIS — R54 ADVANCED AGE: ICD-10-CM

## 2024-04-18 PROCEDURE — 73110 X-RAY EXAM OF WRIST: CPT | Mod: TC,RHCUB,RT

## 2024-04-18 PROCEDURE — 99497 ADVNCD CARE PLAN 30 MIN: CPT | Mod: 33,,, | Performed by: FAMILY MEDICINE

## 2024-04-18 PROCEDURE — G0439 PPPS, SUBSEQ VISIT: HCPCS | Mod: ,,, | Performed by: FAMILY MEDICINE

## 2024-04-18 PROCEDURE — 99214 OFFICE O/P EST MOD 30 MIN: CPT | Mod: ,,, | Performed by: FAMILY MEDICINE

## 2024-04-18 NOTE — PROGRESS NOTES
Internal Medicine      Patient ID: [id number]     Chief Complaint: Medicare Annual Wellness     HPI:     Leatha Adames is a 89 y.o. female here today for a Medicare Annual Wellness visit and comprehensive Health Risk Assessment.     A separate E/M code has been provided to evaluate additional complaints that the patient would like addressed during the dedicated Medicare Wellness Exam.    See assessment and plan for problems addressed during the visit.  Labs from April 11, 2024 reviewed.    She fell about 2 weeks ago and has developed some swelling and pain over the ulnar aspect of the right wrist in the last several days.  There was no other known trauma.    Health Maintenance   Topic Date Due    TETANUS VACCINE  12/15/2004    Hemoglobin A1c  10/11/2024    Eye Exam  03/20/2025    Lipid Panel  04/11/2025    Aspirin/Antiplatelet Therapy  04/18/2025    Shingles Vaccine  Completed        Past Medical History:   Diagnosis Date    Anemia of chronic illness     Anxiety and depression     CAD (coronary artery disease)     Chronic combined systolic and diastolic CHF (congestive heart failure)     CKD (chronic kidney disease) stage 4, GFR 15-29 ml/min     Colon polyp     Dementia     Diabetic polyneuropathy associated with type 2 diabetes mellitus 08/31/2023 August 17, 2023:  EMG nerve conduction study showed generalized mixed sensory-motor polyneuropathy affecting the upper and lower extremities consistent with diabetes.    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     Urge incontinence         Past Surgical History:   Procedure Laterality Date    CARDIAC CATHETERIZATION  07/24/2008    CHOLECYSTECTOMY      COLONOSCOPY      CYSTOCELE REPAIR  2001    EPIDURAL STEROID INJECTION   06/2020    ESOPHAGOGASTRODUODENOSCOPY (EGD) WITH DILATION  05/14/2021    HEMORRHOID SURGERY      HYSTERECTOMY      MASTECTOMY  1995    PERCUTANEOUS TRANSLUMINAL BALLOON ANGIOPLASTY OF CORONARY ARTERY  08/01/2017    PERIPHERAL ARTERIAL STENT GRAFT  09/12/2018        Social History     Socioeconomic History    Marital status:    Tobacco Use    Smoking status: Former    Smokeless tobacco: Never   Substance and Sexual Activity    Alcohol use: Not Currently    Drug use: Yes     Types: Other-see comments     Comment: CBD Drops and lotion        Family History   Problem Relation Name Age of Onset    Breast cancer Mother      Heart disease Father      Epilepsy Father          Current Outpatient Medications   Medication Instructions    acetaminophen (TYLENOL) 1,000 mg, Every 8 hours PRN    aspirin 81 MG Chew aspirin 81 mg chewable tablet   Chew 1 tablet every day by oral route.    atorvastatin (LIPITOR) 40 mg, Oral    azelastine (ASTELIN) 137 mcg, Nasal, 2 times daily    calcitRIOL (ROCALTROL) 0.5 mcg, Oral    clopidogreL (PLAVIX) 75 mg, Daily    colestipoL (COLESTID) 5 g, Oral, 2 times daily    dextromethorphan-guaiFENesin  mg (MUCINEX DM)  mg per 12 hr tablet 1 tablet, Every 12 hours (non-standard times)    erythromycin (ROMYCIN) ophthalmic ointment Nightly    fesoterodine (TOVIAZ) 4 mg, Oral, Daily    FIBER CHOICE ORAL Oral, 4 times daily PRN    furosemide (LASIX) 40 mg, Oral, Daily, Takes 40mg Mwf AND 20mg on TTSS    isosorbide mononitrate (IMDUR) 30 mg, Oral    loratadine (CLARITIN) 10 mg, Oral, Daily    nitroGLYCERIN (NITROSTAT) 0.4 mg, Sublingual    ondansetron (ZOFRAN-ODT) 4 mg, Oral, Every 6 hours PRN    pantoprazole (PROTONIX) 40 mg, Oral, Daily    traMADoL (ULTRAM) 50 mg tablet TAKE 1 TABLET EVERY 8 HOURS AS NEEDED FOR PAIN (TO TREAT CHRONIC OSTEOARTHRITIS PAIN)    UNABLE TO FIND medication name: CBD Tincture 0.5 under tongue every 4-6 hours for pain    UNABLE TO FIND medication name: Leg Cramps  by Dmitry 356 mg 4 times daily    venlafaxine (EFFEXOR-XR) 150 mg, Oral, Daily    vitamin D (VITAMIN D3) 1,000 Units, Oral, Daily    vitamin E 400 Units, Oral, 2 times daily       Review of patient's allergies indicates:   Allergen Reactions    Ace inhibitors Other (See Comments)    Amoxicillin-pot clavulanate Other (See Comments)    Celecoxib Other (See Comments)    Codeine     Hydrocodone-acetaminophen     Irbesartan Other (See Comments)    Penicillins         Immunization History   Administered Date(s) Administered    COVID-19 Vaccine 08/19/2021, 09/21/2021, 11/07/2022    COVID-19, MRNA, LN-S, PF (MODERNA FULL 0.5 ML DOSE) 08/19/2021, 09/21/2021, 11/07/2022    COVID-19, mRNA, LNP-S, bivalent booster, PF (Moderna Omicron)12 + YEARS 11/07/2022    Influenza 11/22/2004, 12/04/2006, 10/29/2007, 12/07/2009    Influenza (FLUAD) - Quadrivalent - Adjuvanted - PF *Preferred* (65+) 11/02/2023    Influenza - High Dose - PF (65 years and older) 11/27/2012, 09/24/2013, 09/23/2014, 09/30/2015, 09/07/2016, 10/04/2017, 09/20/2018, 10/22/2019, 11/07/2022    Influenza - Quadrivalent - High Dose - PF (65 years and older) 10/01/2020, 11/10/2021    Influenza - Trivalent - PF (ADULT) 11/22/2004, 12/04/2006, 10/29/2007, 12/07/2009, 12/20/2010    Pneumococcal Conjugate - 13 Valent 09/30/2015    Pneumococcal Polysaccharide - 23 Valent 09/23/2014    Td (ADULT) 12/15/1994    Zoster 05/14/2012    Zoster Recombinant 10/01/2020, 01/21/2021        Patient Care Team:  Dennys Linares MD as PCP - General (Family Medicine)  Vicki Coyne MD as Consulting Physician (Nephrology)  Yvette Morgan MD as Consulting Physician (Gastroenterology)  Marcelo Restrepo MD as Consulting Physician (Cardiology)  Bethel Kim MD (Dermatology)  Tim Starr MD (Sports Medicine)  Zackery Fine MD (Physical Medicine and Rehabilitation)    Subjective:     Review of Systems    12 point review of systems conducted, negative except as  "stated in the history of present illness. See HPI for details.    Objective:     Visit Vitals  /62 (BP Location: Left arm, Patient Position: Sitting)   Pulse 87   Temp 98.1 °F (36.7 °C) (Temporal)   Ht 5' 2.99" (1.6 m)   Wt 52.6 kg (116 lb)   SpO2 99%   BMI 20.55 kg/m²       Physical Exam  Vitals and nursing note reviewed.   Constitutional:       Appearance: Normal appearance.   HENT:      Head: Normocephalic and atraumatic.   Eyes:      Conjunctiva/sclera: Conjunctivae normal.      Pupils: Pupils are equal, round, and reactive to light.   Cardiovascular:      Rate and Rhythm: Normal rate and regular rhythm.      Heart sounds: Normal heart sounds.   Pulmonary:      Effort: Pulmonary effort is normal.      Breath sounds: Normal breath sounds.   Musculoskeletal:      Right wrist: Swelling and tenderness present.      Left wrist: Normal.      Cervical back: Normal range of motion.      Comments: She has some soft tissue swelling just proximal and lateral to the ulnar styloid of the right wrist   Skin:     General: Skin is warm and dry.      Capillary Refill: Capillary refill takes less than 2 seconds.   Neurological:      General: No focal deficit present.      Mental Status: She is alert and oriented to person, place, and time.   Psychiatric:         Mood and Affect: Mood normal.         Thought Content: Thought content normal.         Judgment: Judgment normal.       X-ray right wrist, my interpretation prior to radiology report:  Soft tissue swelling near the radial styloid and nonspecific calcifications on the volar aspect of the left wrist in the soft tissue.  No obvious evidence of any fracture.    Assessment:       ICD-10-CM ICD-9-CM   1. Encounter for well adult exam with abnormal findings  Z00.01 V70.0   2. Type 2 diabetes mellitus with other circulatory complications  E11.59 250.70   3. Stage 3b chronic kidney disease  N18.32 585.3   4. Pure hypercholesterolemia  E78.00 272.0   5. Iron deficiency anemia " "due to chronic blood loss  D50.0 280.0   6. Thyroid function test abnormal  R94.6 794.5   7. Advanced age  R54 797   8. Right wrist pain  M25.531 719.43        Plan:     1. Encounter for well adult exam with abnormal findings    2. Type 2 diabetes mellitus with other circulatory complications  Overview:  Lab Results   Component Value Date    HGBA1C 5.4 04/11/2024         Assessment & Plan:  Currently off of diabetes treatment     Continue to monitor    Orders:  -     Hemoglobin A1C; Future; Expected date: 07/18/2024    3. Stage 3b chronic kidney disease  Overview:  Lab Results   Component Value Date    CREATININE 1.30 (H) 04/11/2024    EGFRNONAA 30 03/10/2022    EGFRNONAA 38 04/23/2021       Assessment & Plan:  Renal function improved    Avoid NSAIDs    Orders:  -     Comprehensive Metabolic Panel; Future; Expected date: 07/18/2024    4. Pure hypercholesterolemia  Overview:  Lab Results   Component Value Date    CHOL 140 04/11/2024    CHOL 150 09/11/2023    CHOL 140 07/17/2023     Lab Results   Component Value Date    HDL 61 (H) 04/11/2024    HDL 55 09/11/2023    HDL 47 07/17/2023     No results found for: "LDLCALC"  Lab Results   Component Value Date    DLDL 61.4 04/11/2024    DLDL 60.6 09/11/2023    DLDL 59.0 07/17/2023     Lab Results   Component Value Date    TRIG 133 04/11/2024    TRIG 164 09/11/2023    TRIG 205 (H) 07/17/2023       f1 No results found for: "CHOLHDL"      Assessment & Plan:  On atorvastatin 40 mg daily with LDL at goal of less than 70           5. 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 loss  Overview:  Lab Results   Component Value Date    WBC 6.13 04/11/2024    HGB 9.6 (L) 04/11/2024    HCT 29.1 (L) 04/11/2024    MCV 85.6 04/11/2024     04/11/2024       Lab Results   Component Value Date    UIBC 224 04/11/2024    IRON 62 04/11/2024    TRANS 265 04/11/2024    TIBC 286 04/11/2024    LABIRON 22 04/11/2024          Assessment & Plan:  Continue surveillance    Orders:  -     CBC Auto " Differential; Future; Expected date: 07/18/2024    6. Thyroid function test abnormal  Assessment & Plan:  Recheck TSH and free T4 on return to clinic in 3 months    Orders:  -     TSH; Future; Expected date: 07/18/2024  -     T4, Free; Future; Expected date: 07/18/2024    7. Advanced age    8. Right wrist pain  Assessment & Plan:  I believe she has a wrist contusion     Recommended some cryotherapy for 15 minutes 3 times a day    Orders:  -     Cancel: X-Ray Wrist Complete Left; Future; Expected date: 04/18/2024         The following assessments were completed and reviewed. See completed screening forms and assessments within the Encounter Summary.   [x] Health Risk Assessment   [x] CVD Risk Factors   [x] Obesity/Physical Activity -  Encouraged daily 30 minute physical activity x 5 days per week.   [x] Home Safety/Living Situation   [x] Alcohol Screen  [x] Depression (PHQ) Screen   [x] Timed Get Up and Go   [x] Whisper Test  [x] Cognitive Function/Impairment Screen   [x] Nutrition Screening  [x] ADL Screen   [x] Opioid Screen:  [] Patient does not have a prescription for opioids.   [x] Patient has a prescription for opioids but is at low risk for abuse.   [x] Substance Abuse Screen:   [x] Patient does not use substances.   [] Patient screens positive for substance use disorder.   Advance Care Planning     I discussed advanced care planning with the patient including how to pick a person who would make decisions for them if they were unable to make a decision for themselves, called a health care power of .  In addition, we discussed the types of decisions a patient may need to make such as use of life-sustaining treatments, including but not limited to the use of ventilators, artificial feeding tubes, CPR when faced with a life-limiting illness and how these should be recorded on a living will.     The patient has a medical power of , 1 of her daughters, Lexie.  However, she does not have an advance  directive.    I provided her paperwork (5 WISHES HANDOUT) and offered to discuss this with them.           Provided patient with a 5-10 year written screening schedule and personal prevention plan. Recommendations were developed using the USPSTF age appropriate recommendations. Education, counseling, and referrals were provided as needed. After Visit Summary printed and given to patient, which includes a list of additional screenings\tests needed.    Follow up in about 3 months (around 7/18/2024) for chronic health conditions, Fasting labs. In addition to their scheduled follow up, the patient has also been instructed to follow up on as needed basis.     Future Appointments   Date Time Provider Department Center   4/25/2024  9:00 AM OAL, OP THERAPEUTICS [facility]   7/15/2024  9:20 AM LAB, [facility] LABORATORY DRAW STATION [facility] JARAD Sahu [facility]   7/25/2024 10:45 AM [facility]   10/3/2024  1:15 PM Yvette Morgan MD [facility] GASTRO  [place]        Dennys Linares MD

## 2024-04-25 ENCOUNTER — INFUSION (OUTPATIENT)
Dept: INFUSION THERAPY | Facility: HOSPITAL | Age: 89
End: 2024-04-25
Attending: INTERNAL MEDICINE
Payer: MEDICARE

## 2024-04-25 VITALS
SYSTOLIC BLOOD PRESSURE: 166 MMHG | DIASTOLIC BLOOD PRESSURE: 78 MMHG | OXYGEN SATURATION: 100 % | RESPIRATION RATE: 20 BRPM | HEART RATE: 69 BPM | TEMPERATURE: 97 F

## 2024-04-25 DIAGNOSIS — M81.0 AGE-RELATED OSTEOPOROSIS WITHOUT CURRENT PATHOLOGICAL FRACTURE: Primary | ICD-10-CM

## 2024-04-25 DIAGNOSIS — D63.1 ANEMIA DUE TO STAGE 4 CHRONIC KIDNEY DISEASE TREATED WITH ERYTHROPOIETIN: Primary | ICD-10-CM

## 2024-04-25 DIAGNOSIS — N18.4 ANEMIA DUE TO STAGE 4 CHRONIC KIDNEY DISEASE TREATED WITH ERYTHROPOIETIN: Primary | ICD-10-CM

## 2024-04-25 PROCEDURE — 63600175 PHARM REV CODE 636 W HCPCS: Mod: JZ,JG | Performed by: FAMILY MEDICINE

## 2024-04-25 PROCEDURE — 96372 THER/PROPH/DIAG INJ SC/IM: CPT

## 2024-04-25 RX ADMIN — DENOSUMAB 60 MG: 60 INJECTION SUBCUTANEOUS at 09:04

## 2024-04-25 NOTE — PLAN OF CARE
PT DOES NOT NEED EPOGEN DUE TO LABS,  PROLIA GIVEN TO Left arm. PT TOLERATED WELL. PT STATES THAT THEY HAVE RECEIVED PROLIA IN THE PAST WITHOUT SIGNS AND SYMPTOMS OF A REACTION. INSTRUCTED PATIENT TO CALL PCP IF NEEDED.

## 2024-05-27 ENCOUNTER — INFUSION (OUTPATIENT)
Dept: INFUSION THERAPY | Facility: HOSPITAL | Age: 89
End: 2024-05-27
Attending: INTERNAL MEDICINE
Payer: MEDICARE

## 2024-05-27 VITALS
TEMPERATURE: 97 F | BODY MASS INDEX: 21.16 KG/M2 | WEIGHT: 115 LBS | HEART RATE: 76 BPM | OXYGEN SATURATION: 93 % | HEIGHT: 62 IN | RESPIRATION RATE: 18 BRPM | SYSTOLIC BLOOD PRESSURE: 145 MMHG | DIASTOLIC BLOOD PRESSURE: 70 MMHG

## 2024-05-27 DIAGNOSIS — N18.4 ANEMIA DUE TO STAGE 4 CHRONIC KIDNEY DISEASE TREATED WITH ERYTHROPOIETIN: Primary | ICD-10-CM

## 2024-05-27 DIAGNOSIS — D63.1 ANEMIA DUE TO STAGE 4 CHRONIC KIDNEY DISEASE TREATED WITH ERYTHROPOIETIN: Primary | ICD-10-CM

## 2024-05-27 PROCEDURE — 96372 THER/PROPH/DIAG INJ SC/IM: CPT

## 2024-05-27 PROCEDURE — 63600175 PHARM REV CODE 636 W HCPCS: Mod: EC,JG

## 2024-05-27 RX ADMIN — EPOETIN ALFA 20000 UNITS: 20000 SOLUTION INTRAVENOUS; SUBCUTANEOUS at 10:05

## 2024-05-27 NOTE — PLAN OF CARE
PT IN ROOM IN STABLE CONDITION, EPOGEN GIVEN TO Left lower quad. abdomen. PT TOLERATED WELL. PT STATES THAT THEY HAVE RECEIVED EPOGEN IN THE PAST WITHOUT SIGNS AND SYMPTOMS OF A REACTION. INSTRUCTED PATIENT TO CALL PCP IF NEEDED.

## 2024-05-28 ENCOUNTER — OFFICE VISIT (OUTPATIENT)
Dept: FAMILY MEDICINE | Facility: CLINIC | Age: 89
End: 2024-05-28
Payer: MEDICARE

## 2024-05-28 VITALS
OXYGEN SATURATION: 97 % | BODY MASS INDEX: 21.93 KG/M2 | DIASTOLIC BLOOD PRESSURE: 62 MMHG | HEART RATE: 76 BPM | SYSTOLIC BLOOD PRESSURE: 130 MMHG | WEIGHT: 119.19 LBS | TEMPERATURE: 98 F | HEIGHT: 62 IN

## 2024-05-28 DIAGNOSIS — S32.10XD CLOSED FRACTURE OF SACRUM AND COCCYX WITH ROUTINE HEALING: ICD-10-CM

## 2024-05-28 DIAGNOSIS — S32.2XXD CLOSED FRACTURE OF SACRUM AND COCCYX WITH ROUTINE HEALING: ICD-10-CM

## 2024-05-28 DIAGNOSIS — M54.50 ACUTE MIDLINE LOW BACK PAIN WITHOUT SCIATICA: Primary | ICD-10-CM

## 2024-05-28 PROCEDURE — 99214 OFFICE O/P EST MOD 30 MIN: CPT | Mod: ,,, | Performed by: FAMILY MEDICINE

## 2024-05-28 RX ORDER — ROFLUMILAST 3 MG/G
1 AEROSOL, FOAM TOPICAL
COMMUNITY
Start: 2024-05-21

## 2024-05-28 NOTE — PROGRESS NOTES
"SUBJECTIVE:  HPI    Leatha Adames is a 89 y.o. female here for Fall (Lower back pain).     She was walking to answer her door in her house about 4 days ago when she fell onto some steps in her kitchen and landed on her backside.  She had immediate pain.  She has pain that is worse with sitting down and getting up from a seated position.  She denies any bowel or bladder dysfunction.  Her pain is localized over her sacrum.      Curts allergies, medications, history, and problem list were updated as appropriate.    ROS:  Pertinent ROS as above, otherwise negative    OBJECTIVE:  Vital signs  Visit Vitals  /62 (BP Location: Left arm, Patient Position: Sitting)   Pulse 76   Temp 97.7 °F (36.5 °C) (Oral)   Ht 5' 1.81" (1.57 m)   Wt 54.1 kg (119 lb 3.2 oz)   SpO2 97%   BMI 21.94 kg/m²          PHYSICAL EXAM:  General: Awake, alert, no acute distress, antalgic gait and posture while sitting and attempting to stand from a seated position, ambulates with a rolling walker  Back: Tenderness to palpation over the distal sacrum and coccyx and the paraspinous musculature over the lumbosacral spine    X-ray of the lumbosacral spine, my interpretation prior to radiology report:  Chronic degenerative changes throughout the lumbosacral spine.  Apparent distal coccygeal fracture of undetermined age.  Calcification of the distal abdominal aorta.  Prominent amount of stool throughout the colon    ASSESSMENT/PLAN:  1. Acute midline low back pain without sciatica  -     Cancel: X-Ray Lumbar Complete Including Flex And Ext; Future; Expected date: 05/28/2024    2. Closed fracture of sacrum and coccyx with routine healing  Assessment & Plan:  Donut and cushioning     Ice and/or heat as needed for pain control     Tramadol as needed for pain control     Suspend physical therapy for vertigo for 1-2 weeks        "

## 2024-05-28 NOTE — ASSESSMENT & PLAN NOTE
Donut and cushioning     Ice and/or heat as needed for pain control     Tramadol as needed for pain control     Suspend physical therapy for vertigo for 1-2 weeks

## 2024-06-03 DIAGNOSIS — R11.0 NAUSEA: ICD-10-CM

## 2024-06-03 RX ORDER — ONDANSETRON 4 MG/1
4 TABLET, ORALLY DISINTEGRATING ORAL EVERY 6 HOURS PRN
Qty: 10 TABLET | Refills: 0 | Status: SHIPPED | OUTPATIENT
Start: 2024-06-03

## 2024-06-17 PROBLEM — I63.511 CEREBROVASCULAR ACCIDENT (CVA) DUE TO OCCLUSION OF RIGHT MIDDLE CEREBRAL ARTERY: Status: RESOLVED | Noted: 2024-03-14 | Resolved: 2024-06-17

## 2024-06-24 ENCOUNTER — INFUSION (OUTPATIENT)
Dept: INFUSION THERAPY | Facility: HOSPITAL | Age: 89
End: 2024-06-24
Attending: INTERNAL MEDICINE
Payer: MEDICARE

## 2024-06-24 ENCOUNTER — LAB VISIT (OUTPATIENT)
Dept: LAB | Facility: HOSPITAL | Age: 89
End: 2024-06-24
Attending: INTERNAL MEDICINE
Payer: MEDICARE

## 2024-06-24 DIAGNOSIS — D50.0 IRON DEFICIENCY ANEMIA DUE TO CHRONIC BLOOD LOSS: ICD-10-CM

## 2024-06-24 DIAGNOSIS — N18.32 STAGE 3B CHRONIC KIDNEY DISEASE: ICD-10-CM

## 2024-06-24 DIAGNOSIS — E11.59 TYPE 2 DIABETES MELLITUS WITH OTHER CIRCULATORY COMPLICATIONS: ICD-10-CM

## 2024-06-24 DIAGNOSIS — R94.6 THYROID FUNCTION TEST ABNORMAL: ICD-10-CM

## 2024-06-24 LAB
ALBUMIN SERPL-MCNC: 4.1 G/DL (ref 3.4–5)
ALBUMIN/GLOB SERPL: 1.4 RATIO
ALP SERPL-CCNC: 46 UNIT/L (ref 50–144)
ALT SERPL-CCNC: 25 UNIT/L (ref 1–45)
ANION GAP SERPL CALC-SCNC: 7 MEQ/L (ref 2–13)
AST SERPL-CCNC: 38 UNIT/L (ref 14–36)
BASOPHILS # BLD AUTO: 0.06 X10(3)/MCL (ref 0.01–0.08)
BASOPHILS NFR BLD AUTO: 0.7 % (ref 0.1–1.2)
BILIRUB SERPL-MCNC: 0.4 MG/DL (ref 0–1)
BUN SERPL-MCNC: 19 MG/DL (ref 7–20)
CALCIUM SERPL-MCNC: 8.8 MG/DL (ref 8.4–10.2)
CHLORIDE SERPL-SCNC: 105 MMOL/L (ref 98–110)
CO2 SERPL-SCNC: 28 MMOL/L (ref 21–32)
CREAT SERPL-MCNC: 1.18 MG/DL (ref 0.66–1.25)
CREAT/UREA NIT SERPL: 16 (ref 12–20)
EOSINOPHIL # BLD AUTO: 0.47 X10(3)/MCL (ref 0.04–0.36)
EOSINOPHIL NFR BLD AUTO: 5.4 % (ref 0.7–7)
ERYTHROCYTE [DISTWIDTH] IN BLOOD BY AUTOMATED COUNT: 13.9 % (ref 11–14.5)
EST. AVERAGE GLUCOSE BLD GHB EST-MCNC: 139.9 MG/DL (ref 70–115)
GFR SERPLBLD CREATININE-BSD FMLA CKD-EPI: 45 ML/MIN/1.73/M2
GLOBULIN SER-MCNC: 2.9 GM/DL (ref 2–3.9)
GLUCOSE SERPL-MCNC: 158 MG/DL (ref 70–115)
HBA1C MFR BLD: 6.5 % (ref 4–6)
HCT VFR BLD AUTO: 32.6 % (ref 36–48)
HGB BLD-MCNC: 10.3 G/DL (ref 11.8–16)
IMM GRANULOCYTES # BLD AUTO: 0.03 X10(3)/MCL (ref 0–0.03)
IMM GRANULOCYTES NFR BLD AUTO: 0.3 % (ref 0–0.5)
LYMPHOCYTES # BLD AUTO: 1.58 X10(3)/MCL (ref 1.16–3.74)
LYMPHOCYTES NFR BLD AUTO: 18.1 % (ref 20–55)
MCH RBC QN AUTO: 26.3 PG (ref 27–34)
MCHC RBC AUTO-ENTMCNC: 31.6 G/DL (ref 31–37)
MCV RBC AUTO: 83.4 FL (ref 79–99)
MONOCYTES # BLD AUTO: 0.63 X10(3)/MCL (ref 0.24–0.36)
MONOCYTES NFR BLD AUTO: 7.2 % (ref 4.7–12.5)
NEUTROPHILS # BLD AUTO: 5.95 X10(3)/MCL (ref 1.56–6.13)
NEUTROPHILS NFR BLD AUTO: 68.3 % (ref 37–73)
NRBC BLD AUTO-RTO: 0 %
PLATELET # BLD AUTO: 247 X10(3)/MCL (ref 140–371)
PMV BLD AUTO: 9.9 FL (ref 9.4–12.4)
POTASSIUM SERPL-SCNC: 4.1 MMOL/L (ref 3.5–5.1)
PROT SERPL-MCNC: 7 GM/DL (ref 6.3–8.2)
RBC # BLD AUTO: 3.91 X10(6)/MCL (ref 4–5.1)
SODIUM SERPL-SCNC: 140 MMOL/L (ref 136–145)
T4 FREE SERPL-MCNC: 0.89 NG/DL (ref 0.78–2.19)
TSH SERPL-ACNC: 3.91 UIU/ML (ref 0.36–3.74)
WBC # BLD AUTO: 8.72 X10(3)/MCL (ref 4–11.5)

## 2024-06-24 PROCEDURE — 84439 ASSAY OF FREE THYROXINE: CPT

## 2024-06-24 PROCEDURE — 84443 ASSAY THYROID STIM HORMONE: CPT

## 2024-06-24 PROCEDURE — 80053 COMPREHEN METABOLIC PANEL: CPT

## 2024-06-24 PROCEDURE — 83036 HEMOGLOBIN GLYCOSYLATED A1C: CPT

## 2024-06-24 PROCEDURE — 36415 COLL VENOUS BLD VENIPUNCTURE: CPT

## 2024-06-24 PROCEDURE — 85025 COMPLETE CBC W/AUTO DIFF WBC: CPT

## 2024-06-24 NOTE — PLAN OF CARE
PT DOES NOT NEED HER EPOGEN ACCORDING TO HER LAB WORK, NEXT APPOINTMENT MADE, LEFT WITH DAUGHTER AT SIDE.

## 2024-07-22 ENCOUNTER — LAB VISIT (OUTPATIENT)
Dept: LAB | Facility: HOSPITAL | Age: 89
End: 2024-07-22
Attending: INTERNAL MEDICINE
Payer: MEDICARE

## 2024-07-22 ENCOUNTER — INFUSION (OUTPATIENT)
Facility: HOSPITAL | Age: 89
End: 2024-07-22
Attending: INTERNAL MEDICINE
Payer: MEDICARE

## 2024-07-22 VITALS
HEART RATE: 79 BPM | RESPIRATION RATE: 18 BRPM | SYSTOLIC BLOOD PRESSURE: 156 MMHG | DIASTOLIC BLOOD PRESSURE: 72 MMHG | OXYGEN SATURATION: 99 % | TEMPERATURE: 98 F

## 2024-07-22 DIAGNOSIS — D63.1 ANEMIA OF CHRONIC RENAL FAILURE, UNSPECIFIED CKD STAGE: ICD-10-CM

## 2024-07-22 DIAGNOSIS — D50.9 IRON DEFICIENCY ANEMIA, UNSPECIFIED: ICD-10-CM

## 2024-07-22 DIAGNOSIS — I10 HYPERTENSION, UNSPECIFIED TYPE: ICD-10-CM

## 2024-07-22 DIAGNOSIS — N18.9 ANEMIA OF CHRONIC RENAL FAILURE, UNSPECIFIED CKD STAGE: ICD-10-CM

## 2024-07-22 DIAGNOSIS — N18.4 ANEMIA DUE TO STAGE 4 CHRONIC KIDNEY DISEASE TREATED WITH ERYTHROPOIETIN: ICD-10-CM

## 2024-07-22 DIAGNOSIS — N18.4 CHRONIC KIDNEY DISEASE, STAGE IV (SEVERE): Primary | ICD-10-CM

## 2024-07-22 DIAGNOSIS — D63.1 ANEMIA DUE TO STAGE 4 CHRONIC KIDNEY DISEASE TREATED WITH ERYTHROPOIETIN: ICD-10-CM

## 2024-07-22 DIAGNOSIS — I50.32 CHRONIC DIASTOLIC HEART FAILURE: ICD-10-CM

## 2024-07-22 LAB
ANION GAP SERPL CALC-SCNC: 10 MEQ/L (ref 2–13)
BACTERIA #/AREA URNS AUTO: ABNORMAL /HPF
BASOPHILS # BLD AUTO: 0.04 X10(3)/MCL (ref 0.01–0.08)
BASOPHILS NFR BLD AUTO: 0.4 % (ref 0.1–1.2)
BILIRUB UR QL STRIP.AUTO: NEGATIVE
BUN SERPL-MCNC: 28 MG/DL (ref 7–20)
CALCIUM SERPL-MCNC: 9.6 MG/DL (ref 8.4–10.2)
CHLORIDE SERPL-SCNC: 104 MMOL/L (ref 98–110)
CLARITY UR: CLEAR
CO2 SERPL-SCNC: 25 MMOL/L (ref 21–32)
COLOR UR AUTO: YELLOW
CREAT SERPL-MCNC: 1.38 MG/DL (ref 0.66–1.25)
CREAT UR-MCNC: 108.4 MG/DL
CREAT/UREA NIT SERPL: 20 (ref 12–20)
EOSINOPHIL # BLD AUTO: 0.5 X10(3)/MCL (ref 0.04–0.36)
EOSINOPHIL NFR BLD AUTO: 5 % (ref 0.7–7)
ERYTHROCYTE [DISTWIDTH] IN BLOOD BY AUTOMATED COUNT: 14.5 % (ref 11–14.5)
GFR SERPLBLD CREATININE-BSD FMLA CKD-EPI: 37 ML/MIN/1.73/M2
GLUCOSE SERPL-MCNC: 143 MG/DL (ref 70–115)
GLUCOSE UR QL STRIP: NEGATIVE
HCT VFR BLD AUTO: 31 % (ref 36–48)
HGB BLD-MCNC: 10.4 G/DL (ref 11.8–16)
HGB UR QL STRIP: ABNORMAL
IMM GRANULOCYTES # BLD AUTO: 0.04 X10(3)/MCL (ref 0–0.03)
IMM GRANULOCYTES NFR BLD AUTO: 0.4 % (ref 0–0.5)
KETONES UR QL STRIP: NEGATIVE
LEUKOCYTE ESTERASE UR QL STRIP: ABNORMAL
LYMPHOCYTES # BLD AUTO: 1.81 X10(3)/MCL (ref 1.16–3.74)
LYMPHOCYTES NFR BLD AUTO: 18.2 % (ref 20–55)
MCH RBC QN AUTO: 27.7 PG (ref 27–34)
MCHC RBC AUTO-ENTMCNC: 33.5 G/DL (ref 31–37)
MCV RBC AUTO: 82.7 FL (ref 79–99)
MONOCYTES # BLD AUTO: 0.5 X10(3)/MCL (ref 0.24–0.36)
MONOCYTES NFR BLD AUTO: 5 % (ref 4.7–12.5)
NEUTROPHILS # BLD AUTO: 7.06 X10(3)/MCL (ref 1.56–6.13)
NEUTROPHILS NFR BLD AUTO: 71 % (ref 37–73)
NITRITE UR QL STRIP: NEGATIVE
NRBC BLD AUTO-RTO: 0 %
PH UR STRIP: 6 [PH]
PLATELET # BLD AUTO: 258 X10(3)/MCL (ref 140–371)
PMV BLD AUTO: 10 FL (ref 9.4–12.4)
POTASSIUM SERPL-SCNC: 4 MMOL/L (ref 3.5–5.1)
PROT UR QL STRIP: 100
PROT UR STRIP-MCNC: 126 MG/DL
RBC # BLD AUTO: 3.75 X10(6)/MCL (ref 4–5.1)
RBC #/AREA URNS AUTO: ABNORMAL /HPF
SODIUM SERPL-SCNC: 139 MMOL/L (ref 136–145)
SP GR UR STRIP.AUTO: 1.01 (ref 1–1.03)
SQUAMOUS #/AREA URNS AUTO: ABNORMAL /HPF
URINE PROTEIN/CREATININE RATIO (OLG): 1.2
UROBILINOGEN UR STRIP-ACNC: 0.2
WBC # BLD AUTO: 9.95 X10(3)/MCL (ref 4–11.5)
WBC #/AREA URNS AUTO: ABNORMAL /HPF

## 2024-07-22 PROCEDURE — 36415 COLL VENOUS BLD VENIPUNCTURE: CPT

## 2024-07-22 PROCEDURE — 81015 MICROSCOPIC EXAM OF URINE: CPT

## 2024-07-22 PROCEDURE — 80048 BASIC METABOLIC PNL TOTAL CA: CPT

## 2024-07-22 PROCEDURE — 87086 URINE CULTURE/COLONY COUNT: CPT

## 2024-07-22 PROCEDURE — 81003 URINALYSIS AUTO W/O SCOPE: CPT

## 2024-07-22 PROCEDURE — 85025 COMPLETE CBC W/AUTO DIFF WBC: CPT

## 2024-07-22 PROCEDURE — 82570 ASSAY OF URINE CREATININE: CPT

## 2024-07-22 PROCEDURE — 84156 ASSAY OF PROTEIN URINE: CPT

## 2024-07-24 LAB — BACTERIA UR CULT: NORMAL

## 2024-07-25 ENCOUNTER — OFFICE VISIT (OUTPATIENT)
Dept: FAMILY MEDICINE | Facility: CLINIC | Age: 89
End: 2024-07-25
Payer: MEDICARE

## 2024-07-25 VITALS
WEIGHT: 113.19 LBS | SYSTOLIC BLOOD PRESSURE: 124 MMHG | HEIGHT: 62 IN | TEMPERATURE: 98 F | BODY MASS INDEX: 20.83 KG/M2 | HEART RATE: 90 BPM | OXYGEN SATURATION: 95 % | DIASTOLIC BLOOD PRESSURE: 62 MMHG

## 2024-07-25 DIAGNOSIS — N18.4 ANEMIA DUE TO STAGE 4 CHRONIC KIDNEY DISEASE TREATED WITH ERYTHROPOIETIN: Primary | ICD-10-CM

## 2024-07-25 DIAGNOSIS — R54 ADVANCED AGE: ICD-10-CM

## 2024-07-25 DIAGNOSIS — D50.0 IRON DEFICIENCY ANEMIA DUE TO CHRONIC BLOOD LOSS: ICD-10-CM

## 2024-07-25 DIAGNOSIS — D63.1 ANEMIA DUE TO STAGE 4 CHRONIC KIDNEY DISEASE TREATED WITH ERYTHROPOIETIN: Primary | ICD-10-CM

## 2024-07-25 DIAGNOSIS — E11.59 TYPE 2 DIABETES MELLITUS WITH OTHER CIRCULATORY COMPLICATIONS: ICD-10-CM

## 2024-07-25 DIAGNOSIS — E78.00 PURE HYPERCHOLESTEROLEMIA: ICD-10-CM

## 2024-07-25 DIAGNOSIS — N18.32 STAGE 3B CHRONIC KIDNEY DISEASE: ICD-10-CM

## 2024-07-25 PROBLEM — S32.2XXD CLOSED FRACTURE OF SACRUM AND COCCYX WITH ROUTINE HEALING: Status: RESOLVED | Noted: 2024-05-28 | Resolved: 2024-07-25

## 2024-07-25 PROBLEM — M54.50 ACUTE MIDLINE LOW BACK PAIN WITHOUT SCIATICA: Status: RESOLVED | Noted: 2024-05-28 | Resolved: 2024-07-25

## 2024-07-25 PROBLEM — R11.0 NAUSEA: Status: RESOLVED | Noted: 2023-08-04 | Resolved: 2024-07-25

## 2024-07-25 PROBLEM — N30.00 ACUTE CYSTITIS WITHOUT HEMATURIA: Status: RESOLVED | Noted: 2023-08-10 | Resolved: 2024-07-25

## 2024-07-25 PROBLEM — R20.2 PARESTHESIA OF LEFT UPPER AND LOWER EXTREMITY: Status: RESOLVED | Noted: 2023-08-15 | Resolved: 2024-07-25

## 2024-07-25 PROBLEM — R10.9 ABDOMINAL PAIN, RIGHT LATERAL: Status: RESOLVED | Noted: 2023-08-04 | Resolved: 2024-07-25

## 2024-07-25 PROBLEM — M25.531 RIGHT WRIST PAIN: Status: RESOLVED | Noted: 2024-04-18 | Resolved: 2024-07-25

## 2024-07-25 PROBLEM — S32.10XD CLOSED FRACTURE OF SACRUM AND COCCYX WITH ROUTINE HEALING: Status: RESOLVED | Noted: 2024-05-28 | Resolved: 2024-07-25

## 2024-07-25 PROCEDURE — 99214 OFFICE O/P EST MOD 30 MIN: CPT | Mod: ,,, | Performed by: FAMILY MEDICINE

## 2024-07-25 NOTE — PROGRESS NOTES
"SUBJECTIVE:  HPI    Leatha Adames is a 89 y.o. female here for Follow-up (LAB RESULTS) chronic health conditions.  See assessment plan.    She has occasional diarrhea.  She reports that she has lost a little weight since her last visit.  She denies any abdominal pain.  She has not had any more falls.    She does have some dyspnea with exertion.  She has a follow-up appointment with her cardiologist today.      Leatha's allergies, medications, history, and problem list were updated as appropriate.    ROS:  Pertinent ROS as above, otherwise negative    OBJECTIVE:  Vital signs  Visit Vitals  /62 (BP Location: Right arm, Patient Position: Sitting, BP Method: Medium (Manual))   Pulse 90   Temp 98.2 °F (36.8 °C) (Temporal)   Ht 5' 1.81" (1.57 m)   Wt 51.3 kg (113 lb 3.2 oz)   SpO2 95%   BMI 20.83 kg/m²          PHYSICAL EXAM:  General: Awake, alert, no acute distress, thin  Cardiovascular:  Regular rhythm.  Normal rate.  3/6 systolic murmur heard throughout the precordium.  Respiratory: Clear to auscultation bilaterally, normal effort  Abdomen: Soft, nontender, nondistended, no hepatosplenomegaly   Extremities:  No cyanosis, no clubbing, no edema  Skin:  No rashes or appreciable lesions       Chemistry:  Lab Results   Component Value Date     07/22/2024    K 4.0 07/22/2024    BUN 28 (H) 07/22/2024    CREATININE 1.38 (H) 07/22/2024    EGFRNORACEVR 37 07/22/2024    GLUCOSE 143 (H) 07/22/2024    CALCIUM 9.6 07/22/2024    ALKPHOS 46 (L) 06/24/2024    AST 38 (H) 06/24/2024    ALT 25 06/24/2024    MG 1.10 (L) 03/05/2024    TSH 3.910 (H) 06/24/2024    ESIMPR3EVMO 0.89 06/24/2024        Lab Results   Component Value Date    HGBA1C 6.5 (H) 06/24/2024        Hematology:  Lab Results   Component Value Date    WBC 9.95 07/22/2024    HGB 10.4 (L) 07/22/2024    MCV 82.7 07/22/2024     07/22/2024       Lipid Panel:  Lab Results   Component Value Date    CHOL 140 04/11/2024    HDL 61 (H) 04/11/2024    TRIG 133 " "04/11/2024        ASSESSMENT/PLAN:  1. 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 loss  Overview:  Lab Results   Component Value Date    WBC 9.95 07/22/2024    HGB 10.4 (L) 07/22/2024    HCT 31.0 (L) 07/22/2024    MCV 82.7 07/22/2024     07/22/2024       Lab Results   Component Value Date    UIBC 224 04/11/2024    IRON 62 04/11/2024    TRANSFERRIN 265 04/11/2024    TIBC 286 04/11/2024    LABIRON 22 04/11/2024          Assessment & Plan:  Continue surveillance    Orders:  -     Iron and TIBC; Future; Expected date: 10/25/2024  -     Ferritin; Future; Expected date: 10/25/2024    3. Stage 3b chronic kidney disease  Overview:  Lab Results   Component Value Date    CREATININE 1.38 (H) 07/22/2024    EGFRNONAA 30 03/10/2022    EGFRNONAA 38 04/23/2021       Assessment & Plan:  Stable    Avoid NSAIDs    Orders:  -     Comprehensive Metabolic Panel; Future; Expected date: 10/25/2024    4. Type 2 diabetes mellitus with other circulatory complications  Overview:  Lab Results   Component Value Date    HGBA1C 6.5 (H) 06/24/2024         Assessment & Plan:  Currently off of diabetes treatment     Continue to monitor    Orders:  -     Hemoglobin A1C; Future; Expected date: 10/25/2024    5. Advanced age    6. Pure hypercholesterolemia  Overview:  Lab Results   Component Value Date    CHOL 140 04/11/2024    CHOL 150 09/11/2023    CHOL 140 07/17/2023     Lab Results   Component Value Date    HDL 61 (H) 04/11/2024    HDL 55 09/11/2023    HDL 47 07/17/2023     No results found for: "LDLCALC"  Lab Results   Component Value Date    DLDL 61.4 04/11/2024    DLDL 60.6 " "09/11/2023    DLDL 59.0 07/17/2023     Lab Results   Component Value Date    TRIG 133 04/11/2024    TRIG 164 09/11/2023    TRIG 205 (H) 07/17/2023       f1 No results found for: "CHOLHDL"      Orders:  -     Lipid Panel; Future; Expected date: 10/25/2024            Follow Up:  Follow up in about 3 months (around 10/25/2024) for chronic health conditions, Fasting labs.          "

## 2024-08-15 DIAGNOSIS — E78.00 PURE HYPERCHOLESTEROLEMIA: ICD-10-CM

## 2024-08-15 RX ORDER — ATORVASTATIN CALCIUM 40 MG/1
40 TABLET, FILM COATED ORAL
Qty: 90 TABLET | Refills: 3 | Status: SHIPPED | OUTPATIENT
Start: 2024-08-15

## 2024-08-20 DIAGNOSIS — M19.90 OSTEOARTHRITIS, UNSPECIFIED OSTEOARTHRITIS TYPE, UNSPECIFIED SITE: ICD-10-CM

## 2024-08-20 RX ORDER — TRAMADOL HYDROCHLORIDE 50 MG/1
50 TABLET ORAL EVERY 8 HOURS PRN
Qty: 30 TABLET | Refills: 0 | Status: SHIPPED | OUTPATIENT
Start: 2024-08-20

## 2024-08-21 ENCOUNTER — INFUSION (OUTPATIENT)
Facility: HOSPITAL | Age: 89
End: 2024-08-21
Attending: INTERNAL MEDICINE
Payer: MEDICARE

## 2024-08-21 ENCOUNTER — LAB VISIT (OUTPATIENT)
Dept: LAB | Facility: HOSPITAL | Age: 89
End: 2024-08-21
Attending: INTERNAL MEDICINE
Payer: MEDICARE

## 2024-08-21 DIAGNOSIS — N18.4 ANEMIA DUE TO STAGE 4 CHRONIC KIDNEY DISEASE TREATED WITH ERYTHROPOIETIN: ICD-10-CM

## 2024-08-21 DIAGNOSIS — D63.1 ANEMIA DUE TO STAGE 4 CHRONIC KIDNEY DISEASE TREATED WITH ERYTHROPOIETIN: ICD-10-CM

## 2024-08-21 LAB
HCT VFR BLD AUTO: 33.9 % (ref 36–48)
HGB BLD-MCNC: 11 G/DL (ref 11.8–16)

## 2024-08-21 PROCEDURE — 36415 COLL VENOUS BLD VENIPUNCTURE: CPT

## 2024-08-21 PROCEDURE — 85018 HEMOGLOBIN: CPT

## 2024-08-21 NOTE — NURSING
Pt's lab results reviewed, Hgb 11.0, does not need Epoetin today, next appt given to patient and daugter. Discharged home in stable condition.

## 2024-08-28 DIAGNOSIS — F33.41 RECURRENT MAJOR DEPRESSIVE DISORDER, IN PARTIAL REMISSION: ICD-10-CM

## 2024-08-28 RX ORDER — VENLAFAXINE HYDROCHLORIDE 150 MG/1
150 CAPSULE, EXTENDED RELEASE ORAL
Qty: 90 CAPSULE | Refills: 3 | Status: SHIPPED | OUTPATIENT
Start: 2024-08-28

## 2024-09-13 ENCOUNTER — CLINICAL SUPPORT (OUTPATIENT)
Dept: FAMILY MEDICINE | Facility: CLINIC | Age: 89
End: 2024-09-13
Payer: MEDICARE

## 2024-09-13 DIAGNOSIS — Z23 ENCOUNTER FOR ADMINISTRATION OF VACCINE: Primary | ICD-10-CM

## 2024-09-16 ENCOUNTER — LAB VISIT (OUTPATIENT)
Dept: LAB | Facility: HOSPITAL | Age: 89
End: 2024-09-16
Attending: INTERNAL MEDICINE
Payer: MEDICARE

## 2024-09-16 ENCOUNTER — INFUSION (OUTPATIENT)
Facility: HOSPITAL | Age: 89
End: 2024-09-16
Attending: INTERNAL MEDICINE
Payer: MEDICARE

## 2024-09-16 DIAGNOSIS — D63.1 ANEMIA DUE TO STAGE 4 CHRONIC KIDNEY DISEASE TREATED WITH ERYTHROPOIETIN: ICD-10-CM

## 2024-09-16 DIAGNOSIS — N18.4 ANEMIA DUE TO STAGE 4 CHRONIC KIDNEY DISEASE TREATED WITH ERYTHROPOIETIN: ICD-10-CM

## 2024-09-16 LAB
HCT VFR BLD AUTO: 31.6 % (ref 36–48)
HGB BLD-MCNC: 10.4 G/DL (ref 11.8–16)

## 2024-09-16 PROCEDURE — 85018 HEMOGLOBIN: CPT

## 2024-09-16 PROCEDURE — 36415 COLL VENOUS BLD VENIPUNCTURE: CPT

## 2024-09-17 ENCOUNTER — TELEPHONE (OUTPATIENT)
Dept: FAMILY MEDICINE | Facility: CLINIC | Age: 89
End: 2024-09-17
Payer: MEDICARE

## 2024-09-17 NOTE — TELEPHONE ENCOUNTER
----- Message from Dennys Linares MD sent at 2024  8:38 AM CDT -----  See if she has done a DEXA in last two years.  If not, can do in [place].  BMP and Vitamin D 25-OH levels and then I can order the Prolia  ----- Message -----  From: Tatiana Aviles RN  Sent: 2024  12:10 PM CDT  To: Dennys Linares MD    Pt is due for Prolia on 10/26/24, her current therapy plan will  before then. Pt does not have a Dexa Scan on file, we did leave a message with  to see if she has had one in the last 2 years. Pt will also need a current calcium level before treament.     Thank you,  Tatiana Aviles RN

## 2024-09-20 DIAGNOSIS — M19.90 OSTEOARTHRITIS, UNSPECIFIED OSTEOARTHRITIS TYPE, UNSPECIFIED SITE: Primary | ICD-10-CM

## 2024-09-20 DIAGNOSIS — M81.0 AGE-RELATED OSTEOPOROSIS WITHOUT CURRENT PATHOLOGICAL FRACTURE: ICD-10-CM

## 2024-09-27 ENCOUNTER — HOSPITAL ENCOUNTER (OUTPATIENT)
Dept: RADIOLOGY | Facility: HOSPITAL | Age: 89
Discharge: HOME | End: 2024-09-27
Attending: FAMILY MEDICINE
Payer: MEDICARE

## 2024-09-27 ENCOUNTER — TELEPHONE (OUTPATIENT)
Dept: FAMILY MEDICINE | Facility: CLINIC | Age: 89
End: 2024-09-27
Payer: MEDICARE

## 2024-09-27 DIAGNOSIS — M81.0 AGE-RELATED OSTEOPOROSIS WITHOUT CURRENT PATHOLOGICAL FRACTURE: ICD-10-CM

## 2024-09-27 PROCEDURE — 77078 CT BONE DENSITY AXIAL: CPT | Mod: TC

## 2024-09-27 NOTE — TELEPHONE ENCOUNTER
----- Message from Dennys Linares MD sent at 9/27/2024  1:05 PM CDT -----  Her bone density test shows osteopenia     Continue current treatment

## 2024-10-03 ENCOUNTER — OFFICE VISIT (OUTPATIENT)
Dept: GASTROENTEROLOGY | Facility: CLINIC | Age: 89
End: 2024-10-03
Payer: MEDICARE

## 2024-10-03 VITALS
DIASTOLIC BLOOD PRESSURE: 65 MMHG | OXYGEN SATURATION: 97 % | HEART RATE: 73 BPM | BODY MASS INDEX: 21.71 KG/M2 | SYSTOLIC BLOOD PRESSURE: 157 MMHG | HEIGHT: 62 IN | WEIGHT: 118 LBS | TEMPERATURE: 98 F

## 2024-10-03 DIAGNOSIS — K22.2 ESOPHAGEAL STRICTURE: ICD-10-CM

## 2024-10-03 DIAGNOSIS — R13.10 DYSPHAGIA, UNSPECIFIED TYPE: ICD-10-CM

## 2024-10-03 DIAGNOSIS — Z86.0100 HISTORY OF COLON POLYPS: ICD-10-CM

## 2024-10-03 DIAGNOSIS — D50.9 IRON DEFICIENCY ANEMIA, UNSPECIFIED IRON DEFICIENCY ANEMIA TYPE: ICD-10-CM

## 2024-10-03 DIAGNOSIS — K21.9 GASTROESOPHAGEAL REFLUX DISEASE, UNSPECIFIED WHETHER ESOPHAGITIS PRESENT: Primary | ICD-10-CM

## 2024-10-03 DIAGNOSIS — K31.A0 GASTRIC INTESTINAL METAPLASIA: ICD-10-CM

## 2024-10-03 DIAGNOSIS — K52.9 CHRONIC DIARRHEA: ICD-10-CM

## 2024-10-03 PROCEDURE — 99214 OFFICE O/P EST MOD 30 MIN: CPT | Mod: S$GLB,,, | Performed by: INTERNAL MEDICINE

## 2024-10-03 NOTE — PROGRESS NOTES
Clinic Note    Reason for visit:  The primary encounter diagnosis was Gastroesophageal reflux disease, unspecified whether esophagitis present. Diagnoses of Gastric intestinal metaplasia, Dysphagia, unspecified type, Esophageal stricture, Chronic diarrhea, Iron deficiency anemia, unspecified iron deficiency anemia type, and History of colon polyps were also pertinent to this visit.    PCP: Dennys Linares       HPI:  This is a 89 y.o. female who is here for a follow up. Presents with daughter. Patient with hx of esophageal stricture, gastric IM, and chronic diarrhea- on cholestyramine 1 packet as needed. She takes pantoprazole 40 mg daily. She states her dysphagia improved slightly from last dilation but still having dysphagia. She states she has had a couple episodes of choking but may be from not chewing well. She does not feel like she needs repeat dilation. She takes colestipol as needed.    9/16/2024 Hgb 10.4L   7/22/2024 Hgb 10.4L  Labs 9/25/2023: Hgb 11L, MCV 91.8, CBC onl, Cr 1.69H, CMP onl  9/11/2023: Hgb 11.6L, CBC onl, Iron/ferr wnl     EGD 10/25/2023: Distal esophageal stricture s/p dilation to 14 mm. Single gastric fundus diverticulum. Fundic gland polyps. ProxBBx: focal (<0.5mm, h/o) neuroendo cell hyperplasia, Gastric mapping: mild chr inact gitis w/react w/o IM/Hp throughout.    CT AP w/o 8/2023: tree-in-bud RML, smHH, 1.7cm left pelvis ST density, liver unremarkable in body of report, nodular in impression.    7/2023 Capsule endoscopy was normal      EGD 7/18/2023- HH, non-erosive gastritis, GBx chronic inactive gastritis w/o H. pylori     8/2022 CT AP w/o: wnl.     9/15/2021 - Speech Pathology Evaluation - Overall, oral pharyngeal phases wnl, mild-mod pharyngeal residue, mild-mod cervical narrowing. Now on ISMN     EM 9/2021: no chicago classification found     EGD/Colonoscopy 5/14/2021: benign gastroesophageal stricture 13 mm dilated to 15 mm. DBx nl, GBx mild chr inact gastritis and  neuroendocrine cell hyperplasia and focal IM w/o Hp, EBx reflux, 3 TA.    Review of Systems   Constitutional:  Negative for fatigue, fever and unexpected weight change.   HENT:  Positive for postnasal drip. Negative for mouth sores, sore throat and trouble swallowing.    Eyes:  Positive for eye dryness. Negative for pain and discharge.   Respiratory:  Positive for shortness of breath. Negative for apnea, cough, choking, chest tightness and wheezing.    Cardiovascular:  Negative for chest pain, palpitations and leg swelling.   Gastrointestinal:  Positive for reflux. Negative for abdominal distention, abdominal pain, anal bleeding, blood in stool, change in bowel habit, constipation, diarrhea, nausea, rectal pain, vomiting and fecal incontinence.   Genitourinary:  Negative for bladder incontinence, dysuria and hematuria.   Musculoskeletal:  Negative for arthralgias, back pain and joint swelling.   Integumentary:  Negative for color change and rash.   Allergic/Immunologic: Negative for environmental allergies and food allergies.   Neurological:  Negative for seizures and headaches.   Hematological:  Negative for adenopathy. Does not bruise/bleed easily.        Past Medical History:   Diagnosis Date    Anemia of chronic illness     Anxiety and depression     CAD (coronary artery disease)     Chronic combined systolic and diastolic CHF (congestive heart failure)     CKD (chronic kidney disease) stage 4, GFR 15-29 ml/min     Colon polyp     Dementia     Diabetic polyneuropathy associated with type 2 diabetes mellitus 08/31/2023 August 17, 2023:  EMG nerve conduction study showed generalized mixed sensory-motor polyneuropathy affecting the upper and lower extremities consistent with diabetes.    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     Urge incontinence      Past Surgical History:   Procedure Laterality Date    CARDIAC CATHETERIZATION  07/24/2008    CHOLECYSTECTOMY      COLONOSCOPY      CYSTOCELE REPAIR  2001    EPIDURAL STEROID INJECTION  06/2020    ESOPHAGOGASTRODUODENOSCOPY (EGD) WITH DILATION  05/14/2021    HEMORRHOID SURGERY      HYSTERECTOMY      MASTECTOMY  1995    PERCUTANEOUS TRANSLUMINAL BALLOON ANGIOPLASTY OF CORONARY ARTERY  08/01/2017    PERIPHERAL ARTERIAL STENT GRAFT  09/12/2018     Family History   Problem Relation Name Age of Onset    Breast cancer Mother      Heart disease Father      Epilepsy Father       Social History     Tobacco Use    Smoking status: Former    Smokeless tobacco: Never   Substance Use Topics    Alcohol use: Not Currently    Drug use: Yes     Types: Other-see comments     Comment: CBD Drops and lotion     Review of patient's allergies indicates:   Allergen Reactions    Ace inhibitors Other (See Comments)    Amoxicillin-pot clavulanate Other (See Comments)    Celecoxib Other (See Comments)    Codeine     Hydrocodone-acetaminophen     Irbesartan Other (See Comments)    Penicillins         Medication List with Changes/Refills   Current Medications    ACETAMINOPHEN (TYLENOL) 500 MG TABLET    Take 1,000 mg by mouth every 8 (eight) hours as needed for Pain.    ASPIRIN 81 MG CHEW    aspirin 81 mg chewable tablet   Chew 1 tablet every day by oral route.    ATORVASTATIN (LIPITOR) 40 MG TABLET    TAKE 1 TABLET DAILY    AZELASTINE (ASTELIN) 137 MCG (0.1 %) NASAL SPRAY    1 spray (137 mcg total) by Nasal route 2 (two) times daily.    CALCITRIOL (ROCALTROL) 0.5 MCG CAP    TAKE 1 CAPSULE DAILY    COLESTIPOL (COLESTID) 5 GRAM PACK    Take 5 g by mouth 2 (two) times daily.    FESOTERODINE (TOVIAZ) 4 MG TB24    Take 1 tablet (4 mg total) by mouth once daily.    FIBER CHOICE ORAL    Take by mouth 4 (four) times daily as needed.    FUROSEMIDE (LASIX) 40 MG  "TABLET    Take 40 mg by mouth once daily. Takes 40mg Mwf AND 20mg on TTSS    ISOSORBIDE MONONITRATE (IMDUR) 30 MG 24 HR TABLET    Take 30 mg by mouth.    LORATADINE (CLARITIN) 10 MG TABLET    Take 10 mg by mouth once daily.    NITROGLYCERIN (NITROSTAT) 0.4 MG SL TABLET    Place 0.4 mg under the tongue.    PANTOPRAZOLE (PROTONIX) 40 MG TABLET    Take 1 tablet (40 mg total) by mouth once daily.    UNABLE TO FIND    medication name: CBD Tincture 0.5 under tongue every 4-6 hours for pain    UNABLE TO FIND    medication name: Leg Cramps by [facility] 356 mg 4 times daily    VENLAFAXINE (EFFEXOR-XR) 150 MG CP24    TAKE 1 CAPSULE DAILY    VITAMIN D (VITAMIN D3) 1000 UNITS TAB    Take 1,000 Units by mouth once daily.    VITAMIN E 400 UNIT CAPSULE    Take 400 Units by mouth 2 (two) times daily.   Discontinued Medications    CLOPIDOGREL (PLAVIX) 75 MG TABLET    Take 75 mg by mouth once daily. For 21 days started 03/07/2024    DEXTROMETHORPHAN-GUAIFENESIN  MG (MUCINEX DM)  MG PER 12 HR TABLET    Take 1 tablet by mouth every 12 (twelve) hours.    DUPILUMAB (DUPIXENT PEN SUBQ)    Inject into the skin once a week.    ERYTHROMYCIN (ROMYCIN) OPHTHALMIC OINTMENT    every evening.    ONDANSETRON (ZOFRAN-ODT) 4 MG TBDL    TAKE ONE TABLET BY MOUTH EVERY 6 HOURS AS NEEDED    TRAMADOL (ULTRAM) 50 MG TABLET    Take 1 tablet (50 mg total) by mouth every 8 (eight) hours as needed for Pain.    ZORYVE 0.3 % FOAM    Apply 1 Application topically as needed.         Vital Signs:  BP (!) 157/65 (BP Location: Right arm, Patient Position: Sitting)   Pulse 73   Temp 98.4 °F (36.9 °C)   Ht 5' 1.81" (1.57 m)   Wt 53.5 kg (118 lb)   SpO2 97%   BMI 21.72 kg/m²         Physical Exam  Vitals reviewed.   Constitutional:       General: She is awake. She is not in acute distress.     Appearance: Normal appearance. She is well-developed. She is not ill-appearing, toxic-appearing or diaphoretic.      Comments: Ambulates with walker   HENT:      " Head: Normocephalic and atraumatic.      Nose: Nose normal.      Mouth/Throat:      Mouth: Mucous membranes are moist.      Pharynx: Oropharynx is clear. No oropharyngeal exudate or posterior oropharyngeal erythema.   Eyes:      General: Lids are normal. Gaze aligned appropriately. No scleral icterus.        Right eye: No discharge.         Left eye: No discharge.      Conjunctiva/sclera: Conjunctivae normal.   Neck:      Trachea: Trachea normal.   Cardiovascular:      Rate and Rhythm: Normal rate and regular rhythm.      Pulses:           Radial pulses are 2+ on the right side and 2+ on the left side.   Pulmonary:      Effort: Pulmonary effort is normal. No respiratory distress.      Breath sounds: No stridor. No wheezing.   Chest:      Chest wall: No tenderness.   Abdominal:      General: Bowel sounds are normal. There is no distension.      Palpations: Abdomen is soft. There is no fluid wave, hepatomegaly or mass.      Tenderness: There is no abdominal tenderness. There is no guarding or rebound.   Musculoskeletal:         General: No tenderness or deformity.      Cervical back: No tenderness.      Right lower leg: No edema.      Left lower leg: No edema.   Lymphadenopathy:      Cervical: No cervical adenopathy.   Skin:     General: Skin is warm and dry.      Capillary Refill: Capillary refill takes less than 2 seconds.      Coloration: Skin is not cyanotic, jaundiced or pale.   Neurological:      General: No focal deficit present.      Mental Status: She is alert and oriented to person, place, and time.      Motor: No tremor.   Psychiatric:         Attention and Perception: Attention normal.         Mood and Affect: Mood and affect normal.         Speech: Speech normal.         Behavior: Behavior normal. Behavior is cooperative.            All of the data above and below has been reviewed by myself and any further interpretations will be reflected in the assessment and plan.   The data includes review of  external notes, and independent interpretation of lab results, procedures, x-rays, and imaging reports.      Assessment:  Gastroesophageal reflux disease, unspecified whether esophagitis present    Gastric intestinal metaplasia    Dysphagia, unspecified type    Esophageal stricture    Chronic diarrhea    Iron deficiency anemia, unspecified iron deficiency anemia type    History of colon polyps      GERD- doing well on pantoprazole 40mg daily.  Esophageal stricture- discussed chewing well. Notify if dysphagia worsens.  Diarrhea- doing well colestipol as needed.  We will defer further nonurgent nonemergent upper endoscopies given her comorbidities.  Her neuroendocrine cell hyperplasia of gastric biopsies are unlikely to be of clinical significance otherwise.  If she develops any biochemical or physical signs or symptoms, then would have low threshold for repeat upper endoscopy.     Recommendations:    Continue with pantoprazole 40mg daily.  Notify me if your swallowing symptoms worsen.    Risks, benefits, and alternatives of medical management, any associated procedures, and/or treatment discussed with the patient. Patient given opportunity to ask questions and voices understanding. Patient has elected to proceed with the recommended care modalities as discussed.    Instructed patient to notify my office if they have not been contacted within two weeks after any procedures, submitting any samples (biopsies, blood, stool, urine, etc.) or after any imaging (X-ray, CT, MRI, etc.).     Follow up in about 1 year (around 10/3/2025).    Order summary:  No orders of the defined types were placed in this encounter.     This assessment, plan, and documentation was performed in collaboration with Colleen Young NP.     This document may have been created using a voice recognition transcribing system. Incorrect words or phrases may have been missed during proofreading. Please interpret accordingly or contact me for  clarification.     Yvette Morgan MD

## 2024-10-03 NOTE — LETTER
October 3, 2024        Dennys Linares MD  [address] [facility]  [address]   [place] [address]             [facility]  [address] [address]  Phone: [phone]  Fax: [phone]   Patient: Leatha Adames   MR Number: [id number]   YOB: 1935   Date of Visit: 10/3/2024       Dear Dr. Linares:    Thank you for referring Leatha Adames to me for evaluation. Below are the relevant portions of my assessment and plan of care.            If you have questions, please do not hesitate to call me. I look forward to following Leatha along with you.    Sincerely,      Yvette Morgan MD           CC  No Recipients

## 2024-10-10 ENCOUNTER — TELEPHONE (OUTPATIENT)
Dept: FAMILY MEDICINE | Facility: CLINIC | Age: 89
End: 2024-10-10
Payer: MEDICARE

## 2024-10-10 DIAGNOSIS — E78.00 PURE HYPERCHOLESTEROLEMIA: ICD-10-CM

## 2024-10-14 ENCOUNTER — INFUSION (OUTPATIENT)
Facility: HOSPITAL | Age: 89
End: 2024-10-14
Attending: INTERNAL MEDICINE
Payer: MEDICARE

## 2024-10-14 ENCOUNTER — LAB VISIT (OUTPATIENT)
Dept: LAB | Facility: HOSPITAL | Age: 89
End: 2024-10-14
Attending: INTERNAL MEDICINE
Payer: MEDICARE

## 2024-10-14 DIAGNOSIS — D63.1 ANEMIA DUE TO STAGE 4 CHRONIC KIDNEY DISEASE TREATED WITH ERYTHROPOIETIN: ICD-10-CM

## 2024-10-14 DIAGNOSIS — D63.1 ANEMIA DUE TO STAGE 4 CHRONIC KIDNEY DISEASE TREATED WITH ERYTHROPOIETIN: Primary | ICD-10-CM

## 2024-10-14 DIAGNOSIS — N18.4 ANEMIA DUE TO STAGE 4 CHRONIC KIDNEY DISEASE TREATED WITH ERYTHROPOIETIN: Primary | ICD-10-CM

## 2024-10-14 DIAGNOSIS — N18.4 ANEMIA DUE TO STAGE 4 CHRONIC KIDNEY DISEASE TREATED WITH ERYTHROPOIETIN: ICD-10-CM

## 2024-10-14 LAB
BASOPHILS # BLD AUTO: 0.08 X10(3)/MCL (ref 0.01–0.08)
BASOPHILS NFR BLD AUTO: 0.9 % (ref 0.1–1.2)
EOSINOPHIL # BLD AUTO: 0.42 X10(3)/MCL (ref 0.04–0.36)
EOSINOPHIL NFR BLD AUTO: 4.9 % (ref 0.7–7)
ERYTHROCYTE [DISTWIDTH] IN BLOOD BY AUTOMATED COUNT: 13.8 % (ref 11–14.5)
HCT VFR BLD AUTO: 30.4 % (ref 36–48)
HGB BLD-MCNC: 9.8 G/DL (ref 11.8–16)
IMM GRANULOCYTES # BLD AUTO: 0.02 X10(3)/MCL (ref 0–0.03)
IMM GRANULOCYTES NFR BLD AUTO: 0.2 % (ref 0–0.5)
LYMPHOCYTES # BLD AUTO: 1.94 X10(3)/MCL (ref 1.16–3.74)
LYMPHOCYTES NFR BLD AUTO: 22.5 % (ref 20–55)
MCH RBC QN AUTO: 27.6 PG (ref 27–34)
MCHC RBC AUTO-ENTMCNC: 32.2 G/DL (ref 31–37)
MCV RBC AUTO: 85.6 FL (ref 79–99)
MONOCYTES # BLD AUTO: 0.63 X10(3)/MCL (ref 0.24–0.36)
MONOCYTES NFR BLD AUTO: 7.3 % (ref 4.7–12.5)
NEUTROPHILS # BLD AUTO: 5.53 X10(3)/MCL (ref 1.56–6.13)
NEUTROPHILS NFR BLD AUTO: 64.2 % (ref 37–73)
NRBC BLD AUTO-RTO: 0 %
PLATELET # BLD AUTO: 250 X10(3)/MCL (ref 140–371)
PMV BLD AUTO: 9.7 FL (ref 9.4–12.4)
RBC # BLD AUTO: 3.55 X10(6)/MCL (ref 4–5.1)
WBC # BLD AUTO: 8.62 X10(3)/MCL (ref 4–11.5)

## 2024-10-14 PROCEDURE — 63600175 PHARM REV CODE 636 W HCPCS: Mod: JZ,EC

## 2024-10-14 PROCEDURE — 36415 COLL VENOUS BLD VENIPUNCTURE: CPT

## 2024-10-14 PROCEDURE — 85025 COMPLETE CBC W/AUTO DIFF WBC: CPT

## 2024-10-14 PROCEDURE — 96372 THER/PROPH/DIAG INJ SC/IM: CPT

## 2024-10-14 RX ADMIN — EPOETIN ALFA-EPBX 20000 UNITS: 10000 INJECTION, SOLUTION INTRAVENOUS; SUBCUTANEOUS at 10:10

## 2024-10-22 ENCOUNTER — OFFICE VISIT (OUTPATIENT)
Dept: FAMILY MEDICINE | Facility: CLINIC | Age: 89
End: 2024-10-22
Payer: MEDICARE

## 2024-10-22 VITALS
SYSTOLIC BLOOD PRESSURE: 136 MMHG | TEMPERATURE: 100 F | DIASTOLIC BLOOD PRESSURE: 68 MMHG | HEART RATE: 83 BPM | HEIGHT: 62 IN | WEIGHT: 121.13 LBS | BODY MASS INDEX: 22.29 KG/M2 | OXYGEN SATURATION: 98 %

## 2024-10-22 DIAGNOSIS — R53.83 FATIGUE, UNSPECIFIED TYPE: Primary | ICD-10-CM

## 2024-10-22 DIAGNOSIS — I35.1 NONRHEUMATIC AORTIC VALVE INSUFFICIENCY: ICD-10-CM

## 2024-10-22 DIAGNOSIS — I36.1 NONRHEUMATIC TRICUSPID VALVE REGURGITATION: ICD-10-CM

## 2024-10-22 DIAGNOSIS — I34.0 MODERATE MITRAL VALVE REGURGITATION: ICD-10-CM

## 2024-10-22 DIAGNOSIS — I34.2 NONRHEUMATIC MITRAL VALVE STENOSIS: ICD-10-CM

## 2024-10-22 PROCEDURE — 99214 OFFICE O/P EST MOD 30 MIN: CPT | Mod: ,,, | Performed by: FAMILY MEDICINE

## 2024-10-22 NOTE — PROGRESS NOTES
"SUBJECTIVE:  HPI    Leatha Adames is a 89 y.o. female here for Fatigue (1 wk ).     She is accompanied by 1 of her daughters to the visit today with reports of progressively worsening fatigue, decreased exertional tolerance, increase dry cough and general malaise.  She denies any fever or chills.  She also describes a vibration of her entire body" has been going on over the last week.  Symptoms have not improved despite receiving her Epogen and iron therapy infusions.    She has had a notable 8 lb weight gain since July.    She is scheduled for her routine lab work with me in less than 2 weeks.      Curts allergies, medications, history, and problem list were updated as appropriate.    ROS:  Pertinent ROS as above, otherwise negative    OBJECTIVE:  Vital signs  Visit Vitals  /68 (BP Location: Left arm, Patient Position: Sitting)   Pulse 83   Temp 99.6 °F (37.6 °C) (Temporal)   Ht 5' 1.81" (1.57 m)   Wt 54.9 kg (121 lb 2 oz)   SpO2 98%   BMI 22.29 kg/m²          PHYSICAL EXAM:  General:  Awake, alert, no acute distress, appears in usual state of health, ambulating with a walker  Eyes:  Pale conjunctiva bilaterally  Cardiovascular: Regular rate and rhythm with a 3/6 systolic murmur heard at the left and right sternal border radiating throughout the precordium  Respiratory: Clear to auscultation bilaterally without rales or wheezes appreciated  Extremities: Trace pretibial edema      ASSESSMENT/PLAN:  1. Fatigue, unspecified type    2. Nonrheumatic aortic valve insufficiency  Overview:  November 2, 2023 echocardiogram, mild-to-moderate with sclerotic aortic valve leaflets and mild AS      3. Nonrheumatic mitral valve stenosis  Overview:  November 2, 2023 echocardiogram, moderate mitral stenosis with moderate posteriorly directed mitral valve regurgitation      4. Moderate mitral valve regurgitation  Overview:  November 2, 2023 echocardiogram moderate posteriorly directed mitral valve regurgitation      5. " Nonrheumatic tricuspid valve regurgitation  Overview:  November 2, 2023 echocardiogram moderate to severe tricuspid regurgitation with right ventricular systolic pressure of 60 mmHg      In light of her cough symptoms and prominence of her murmur relative to prior exams in the remainder of her symptoms, I wonder if her valvular heart disease has not worsened.    Of course, her fatigue could be multifactorial in light of her extensive problem list.    Her hemoglobin has been fairly stable around 10.0 with Epogen and iron infusions.    The last echocardiogram that I can find for her was November of 2023 and I would like for her to see her cardiologist again to reassess her valvular heart disease and left ventricular function and right ventricular systolic pressure.    She will follow up with me as scheduled with her lab work in less than 2 weeks.

## 2024-10-22 NOTE — Clinical Note
I sent a copy of today's note to Dr. Restrepo.  See if they can see her within the next week with an echocardiogram

## 2024-11-01 PROCEDURE — 80053 COMPREHEN METABOLIC PANEL: CPT | Performed by: FAMILY MEDICINE

## 2024-11-01 PROCEDURE — 80061 LIPID PANEL: CPT | Performed by: FAMILY MEDICINE

## 2024-11-01 PROCEDURE — 83036 HEMOGLOBIN GLYCOSYLATED A1C: CPT | Performed by: FAMILY MEDICINE

## 2024-11-01 PROCEDURE — 82728 ASSAY OF FERRITIN: CPT | Performed by: FAMILY MEDICINE

## 2024-11-01 PROCEDURE — 85025 COMPLETE CBC W/AUTO DIFF WBC: CPT | Performed by: FAMILY MEDICINE

## 2024-11-01 PROCEDURE — 83550 IRON BINDING TEST: CPT | Performed by: FAMILY MEDICINE

## 2024-11-04 ENCOUNTER — OFFICE VISIT (OUTPATIENT)
Dept: FAMILY MEDICINE | Facility: CLINIC | Age: 89
End: 2024-11-04
Payer: MEDICARE

## 2024-11-04 VITALS
TEMPERATURE: 98 F | WEIGHT: 119.19 LBS | BODY MASS INDEX: 21.93 KG/M2 | HEART RATE: 85 BPM | OXYGEN SATURATION: 99 % | HEIGHT: 62 IN | SYSTOLIC BLOOD PRESSURE: 130 MMHG | DIASTOLIC BLOOD PRESSURE: 66 MMHG

## 2024-11-04 DIAGNOSIS — N18.4 ANEMIA DUE TO STAGE 4 CHRONIC KIDNEY DISEASE TREATED WITH ERYTHROPOIETIN: ICD-10-CM

## 2024-11-04 DIAGNOSIS — I50.32 CHRONIC DIASTOLIC (CONGESTIVE) HEART FAILURE: ICD-10-CM

## 2024-11-04 DIAGNOSIS — D63.1 ANEMIA DUE TO STAGE 4 CHRONIC KIDNEY DISEASE TREATED WITH ERYTHROPOIETIN: ICD-10-CM

## 2024-11-04 DIAGNOSIS — N18.32 STAGE 3B CHRONIC KIDNEY DISEASE: ICD-10-CM

## 2024-11-04 DIAGNOSIS — E78.00 PURE HYPERCHOLESTEROLEMIA: ICD-10-CM

## 2024-11-04 DIAGNOSIS — D50.0 IRON DEFICIENCY ANEMIA DUE TO CHRONIC BLOOD LOSS: Primary | ICD-10-CM

## 2024-11-04 DIAGNOSIS — I36.1 NONRHEUMATIC TRICUSPID VALVE REGURGITATION: ICD-10-CM

## 2024-11-04 DIAGNOSIS — E11.59 TYPE 2 DIABETES MELLITUS WITH OTHER CIRCULATORY COMPLICATIONS: ICD-10-CM

## 2024-11-04 DIAGNOSIS — I34.2 NONRHEUMATIC MITRAL VALVE STENOSIS: ICD-10-CM

## 2024-11-04 PROCEDURE — 99214 OFFICE O/P EST MOD 30 MIN: CPT | Mod: ,,, | Performed by: FAMILY MEDICINE

## 2024-11-04 RX ORDER — ALBUTEROL SULFATE 90 UG/1
2 INHALANT RESPIRATORY (INHALATION) 4 TIMES DAILY PRN
COMMUNITY
Start: 2024-10-24

## 2024-11-04 NOTE — ASSESSMENT & PLAN NOTE
On Lasix 20 mg daily, Imdur    We will see if she can tolerate low-dose SGLT2 inhibitor therapy and it appears that Jardiance is on her formulary so we will start Jardiance 10 mg daily     Discussed side effects including orthostasis, weight loss, vaginitis and she will notify us for any problems

## 2024-11-04 NOTE — PROGRESS NOTES
"SUBJECTIVE:  HPI    Leatha Adames is a 89 y.o. female here for followup - lab results on chronic health conditions.      Recent note from Cardiology reviewed.    She continues to have dyspnea with exertion.  She has generalized weakness.  No hypoglycemia.    Curts allergies, medications, history, and problem list were updated as appropriate.    ROS:  Pertinent ROS as above, otherwise negative    OBJECTIVE:  Vital signs  Visit Vitals  /66 (BP Location: Left arm, Patient Position: Sitting)   Pulse 85   Temp 97.6 °F (36.4 °C) (Temporal)   Ht 5' 1.81" (1.57 m)   Wt 54.1 kg (119 lb 3.2 oz)   SpO2 99%   BMI 21.94 kg/m²          PHYSICAL EXAM:  General: Awake, alert, no acute distress  Cardiac: Regular rate and regular rhythm with a 3/6 diastolic murmur best heard at the at the left sternal border  Respiratory: Clear to auscultation bilaterally without rales or wheezes  Extremities:  No peripheral edema    Chemistry:  Lab Results   Component Value Date     11/01/2024    K 4.2 11/01/2024    BUN 20 11/01/2024    CREATININE 1.56 (H) 11/01/2024    EGFRNORACEVR 32 11/01/2024    GLUCOSE 140 (H) 11/01/2024    CALCIUM 10.5 (H) 11/01/2024    ALKPHOS 51 11/01/2024    AST 28 11/01/2024    ALT 18 11/01/2024    MG 1.10 (L) 03/05/2024    TSH 3.910 (H) 06/24/2024    [id number] 0.89 06/24/2024        Lab Results   Component Value Date    HGBA1C 6.3 (H) 11/01/2024        Hematology:  Lab Results   Component Value Date    WBC 7.37 11/01/2024    HGB 9.5 (L) 11/01/2024    MCV 82.8 11/01/2024     11/01/2024       Lipid Panel:  Lab Results   Component Value Date    CHOL 150 11/01/2024    HDL 67 (H) 11/01/2024    LDLDIRECT 64.8 11/01/2024    TRIG 125 11/01/2024        ASSESSMENT/PLAN:  1. 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 loss  Overview:  Lab Results   Component Value Date    WBC 7.37 11/01/2024    HGB 9.5 (L) 11/01/2024    HCT 29.4 (L) 11/01/2024    MCV 82.8 11/01/2024     11/01/2024       Lab Results " "  Component Value Date    UIBC 290 11/01/2024    IRON 37 (L) 11/01/2024    TRANSFERRIN 299 11/01/2024    TIBC 327 11/01/2024    LABIRON 11 (L) 11/01/2024      Lab Results   Component Value Date    FERRITIN 14.0 11/01/2024           Orders:  -     CBC Auto Differential; Future; Expected date: 02/04/2025  -     Iron and TIBC; Future; Expected date: 02/04/2025    2. 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 Type 2 diabetes mellitus with other circulatory complications  Overview:  Lab Results   Component Value Date    HGBA1C 6.3 (H) 11/01/2024         Assessment & Plan:  We will add low-dose SGLT2 inhibitor therapy in light of comorbidities and see how she tolerates    Orders:  -     Hemoglobin A1C; Future; Expected date: 02/04/2025  -     Microalbumin/Creatinine Ratio, Urine; Future; Expected date: 02/04/2025    4. Stage 3b chronic kidney disease  Overview:  Lab Results   Component Value Date    CREATININE 1.56 (H) 11/01/2024    EGFRNONAA 30 03/10/2022    EGFRNONAA 38 04/23/2021       Orders:  -     Comprehensive Metabolic Panel; Future; Expected date: 02/04/2025  -     Microalbumin/Creatinine Ratio, Urine; Future; Expected date: 02/04/2025  -     empagliflozin (JARDIANCE) 10 mg tablet; Take 1 tablet (10 mg total) by mouth once daily.  Dispense: 90 tablet; Refill: 3    5. Pure hypercholesterolemia  Overview:  Lab Results   Component Value Date    CHOL 150 11/01/2024    CHOL 140 04/11/2024    CHOL 150 09/11/2023     Lab Results   Component Value Date    HDL 67 (H) 11/01/2024    HDL 61 (H) 04/11/2024    HDL 55 09/11/2023     No results found for: "LDLCALC"  No results found for: "DLDL"    Lab Results   Component Value Date    TRIG 125 11/01/2024    TRIG 133 04/11/2024    " "TRIG 164 09/11/2023       f1 No results found for: "CHOLHDL"      Assessment & Plan:  On atorvastatin 40 mg daily with LDL at goal of less than 70       6. Nonrheumatic mitral valve stenosis  Overview:  November 2, 2023 echocardiogram, moderate mitral stenosis with moderate posteriorly directed mitral valve regurgitation      7. Nonrheumatic tricuspid valve regurgitation  Overview:  November 2, 2023 echocardiogram moderate to severe tricuspid regurgitation with right ventricular systolic pressure of 60 mmHg      8. Chronic diastolic (congestive) heart failure  Assessment & Plan:  On Lasix 20 mg daily, Imdur    We will see if she can tolerate low-dose SGLT2 inhibitor therapy and it appears that Jardiance is on her formulary so we will start Jardiance 10 mg daily     Discussed side effects including orthostasis, weight loss, vaginitis and she will notify us for any problems          Follow Up:  Follow up in about 3 months (around 2/4/2025) for chronic health conditions, Fasting labs.          "

## 2024-11-07 DIAGNOSIS — M15.0 PRIMARY OSTEOARTHRITIS INVOLVING MULTIPLE JOINTS: Primary | ICD-10-CM

## 2024-11-08 RX ORDER — TRAMADOL HYDROCHLORIDE 50 MG/1
50 TABLET ORAL EVERY 8 HOURS PRN
Qty: 30 TABLET | Refills: 0 | Status: SHIPPED | OUTPATIENT
Start: 2024-11-08

## 2024-11-11 ENCOUNTER — INFUSION (OUTPATIENT)
Facility: HOSPITAL | Age: 89
End: 2024-11-11
Attending: FAMILY MEDICINE
Payer: MEDICARE

## 2024-11-11 ENCOUNTER — LAB VISIT (OUTPATIENT)
Dept: LAB | Facility: HOSPITAL | Age: 89
End: 2024-11-11
Attending: FAMILY MEDICINE
Payer: MEDICARE

## 2024-11-11 VITALS
WEIGHT: 117 LBS | OXYGEN SATURATION: 99 % | HEART RATE: 85 BPM | RESPIRATION RATE: 20 BRPM | DIASTOLIC BLOOD PRESSURE: 68 MMHG | HEIGHT: 61 IN | BODY MASS INDEX: 22.09 KG/M2 | TEMPERATURE: 98 F | SYSTOLIC BLOOD PRESSURE: 182 MMHG

## 2024-11-11 DIAGNOSIS — D63.1 ANEMIA DUE TO STAGE 4 CHRONIC KIDNEY DISEASE TREATED WITH ERYTHROPOIETIN: ICD-10-CM

## 2024-11-11 DIAGNOSIS — M81.0 AGE-RELATED OSTEOPOROSIS WITHOUT CURRENT PATHOLOGICAL FRACTURE: Primary | ICD-10-CM

## 2024-11-11 DIAGNOSIS — N18.4 ANEMIA DUE TO STAGE 4 CHRONIC KIDNEY DISEASE TREATED WITH ERYTHROPOIETIN: ICD-10-CM

## 2024-11-11 LAB
HCT VFR BLD AUTO: 28.5 % (ref 36–48)
HGB BLD-MCNC: 9.1 G/DL (ref 11.8–16)

## 2024-11-11 PROCEDURE — 63600175 PHARM REV CODE 636 W HCPCS: Mod: JZ | Performed by: FAMILY MEDICINE

## 2024-11-11 PROCEDURE — 36415 COLL VENOUS BLD VENIPUNCTURE: CPT

## 2024-11-11 PROCEDURE — 63600175 PHARM REV CODE 636 W HCPCS: Mod: JZ,EC

## 2024-11-11 PROCEDURE — 96372 THER/PROPH/DIAG INJ SC/IM: CPT

## 2024-11-11 PROCEDURE — 85014 HEMATOCRIT: CPT

## 2024-11-11 RX ADMIN — DENOSUMAB 60 MG: 60 INJECTION SUBCUTANEOUS at 09:11

## 2024-11-11 RX ADMIN — EPOETIN ALFA-EPBX 20000 UNITS: 10000 INJECTION, SOLUTION INTRAVENOUS; SUBCUTANEOUS at 09:11

## 2024-11-11 NOTE — NURSING
Pt received prolia. Pt tolerated well. Pt discharged in stable condition. Pt educated on no dental work 3 months prior or 3 months after injection. Pt also educated on the importance of taking calcium and vit d supplements. Pt states understanding at this time.   Retacrit injection administered, tolerated well.

## 2024-11-18 DIAGNOSIS — N39.42 URINARY INCONTINENCE WITHOUT SENSORY AWARENESS: ICD-10-CM

## 2024-11-18 RX ORDER — FESOTERODINE FUMARATE 4 MG/1
4 TABLET, FILM COATED, EXTENDED RELEASE ORAL
Qty: 90 TABLET | Refills: 3 | Status: SHIPPED | OUTPATIENT
Start: 2024-11-18

## 2024-12-09 ENCOUNTER — LAB VISIT (OUTPATIENT)
Dept: LAB | Facility: HOSPITAL | Age: 89
End: 2024-12-09
Attending: FAMILY MEDICINE
Payer: MEDICARE

## 2024-12-09 ENCOUNTER — INFUSION (OUTPATIENT)
Facility: HOSPITAL | Age: 89
End: 2024-12-09
Attending: FAMILY MEDICINE
Payer: MEDICARE

## 2024-12-09 VITALS
HEIGHT: 61 IN | OXYGEN SATURATION: 99 % | DIASTOLIC BLOOD PRESSURE: 73 MMHG | HEART RATE: 84 BPM | RESPIRATION RATE: 20 BRPM | TEMPERATURE: 98 F | SYSTOLIC BLOOD PRESSURE: 156 MMHG | WEIGHT: 116.81 LBS | BODY MASS INDEX: 22.05 KG/M2

## 2024-12-09 DIAGNOSIS — D63.1 ANEMIA DUE TO STAGE 4 CHRONIC KIDNEY DISEASE TREATED WITH ERYTHROPOIETIN: ICD-10-CM

## 2024-12-09 DIAGNOSIS — N18.4 ANEMIA DUE TO STAGE 4 CHRONIC KIDNEY DISEASE TREATED WITH ERYTHROPOIETIN: Primary | ICD-10-CM

## 2024-12-09 DIAGNOSIS — N18.4 ANEMIA DUE TO STAGE 4 CHRONIC KIDNEY DISEASE TREATED WITH ERYTHROPOIETIN: ICD-10-CM

## 2024-12-09 DIAGNOSIS — D63.1 ANEMIA DUE TO STAGE 4 CHRONIC KIDNEY DISEASE TREATED WITH ERYTHROPOIETIN: Primary | ICD-10-CM

## 2024-12-09 LAB
HCT VFR BLD AUTO: 31.1 % (ref 36–48)
HGB BLD-MCNC: 9.8 G/DL (ref 11.8–16)

## 2024-12-09 PROCEDURE — 63600175 PHARM REV CODE 636 W HCPCS: Mod: EC

## 2024-12-09 PROCEDURE — 36415 COLL VENOUS BLD VENIPUNCTURE: CPT

## 2024-12-09 PROCEDURE — 96372 THER/PROPH/DIAG INJ SC/IM: CPT

## 2024-12-09 PROCEDURE — 85018 HEMOGLOBIN: CPT

## 2024-12-09 RX ADMIN — EPOETIN ALFA 20000 UNITS: 20000 SOLUTION INTRAVENOUS; SUBCUTANEOUS at 10:12

## 2024-12-16 ENCOUNTER — TELEPHONE (OUTPATIENT)
Dept: FAMILY MEDICINE | Facility: CLINIC | Age: 89
End: 2024-12-16
Payer: MEDICARE

## 2024-12-16 DIAGNOSIS — I20.9 ANGINA PECTORIS: Primary | ICD-10-CM

## 2024-12-16 DIAGNOSIS — M81.0 AGE-RELATED OSTEOPOROSIS WITHOUT CURRENT PATHOLOGICAL FRACTURE: ICD-10-CM

## 2024-12-16 RX ORDER — CALCITRIOL 0.5 UG/1
0.5 CAPSULE ORAL
Qty: 90 CAPSULE | Refills: 3 | Status: SHIPPED | OUTPATIENT
Start: 2024-12-16

## 2024-12-16 RX ORDER — NITROGLYCERIN 0.4 MG/1
0.4 TABLET SUBLINGUAL EVERY 5 MIN PRN
Qty: 30 TABLET | Refills: 5 | Status: SHIPPED | OUTPATIENT
Start: 2024-12-16

## 2024-12-16 NOTE — TELEPHONE ENCOUNTER
Daughter Lexie states that pt ran out of Nitro this morning. She states that she thinks it's Dr. Restrepo that prescribes this, but he is out of the office. She would like this called in to [facility].

## 2024-12-28 ENCOUNTER — HOSPITAL ENCOUNTER (EMERGENCY)
Facility: HOSPITAL | Age: 89
Discharge: HOME | End: 2024-12-28
Attending: FAMILY MEDICINE
Payer: MEDICARE

## 2024-12-28 VITALS
RESPIRATION RATE: 18 BRPM | HEIGHT: 63 IN | WEIGHT: 123.44 LBS | DIASTOLIC BLOOD PRESSURE: 85 MMHG | HEART RATE: 74 BPM | SYSTOLIC BLOOD PRESSURE: 173 MMHG | TEMPERATURE: 98 F | BODY MASS INDEX: 21.87 KG/M2 | OXYGEN SATURATION: 95 %

## 2024-12-28 DIAGNOSIS — R07.9 CHEST PAIN: ICD-10-CM

## 2024-12-28 DIAGNOSIS — I50.9 CONGESTIVE HEART FAILURE, UNSPECIFIED HF CHRONICITY, UNSPECIFIED HEART FAILURE TYPE: Primary | ICD-10-CM

## 2024-12-28 DIAGNOSIS — R06.00 DYSPNEA: ICD-10-CM

## 2024-12-28 DIAGNOSIS — E83.42 HYPOMAGNESEMIA: ICD-10-CM

## 2024-12-28 LAB
ALBUMIN SERPL-MCNC: 4.1 G/DL (ref 3.4–5)
ALBUMIN/GLOB SERPL: 1.3 RATIO
ALP SERPL-CCNC: 42 UNIT/L (ref 50–144)
ALT SERPL-CCNC: 15 UNIT/L (ref 1–45)
ANION GAP SERPL CALC-SCNC: 5 MEQ/L (ref 2–13)
AST SERPL-CCNC: 24 UNIT/L (ref 14–36)
BASOPHILS # BLD AUTO: 0.05 X10(3)/MCL (ref 0.01–0.08)
BASOPHILS NFR BLD AUTO: 0.7 % (ref 0.1–1.2)
BILIRUB SERPL-MCNC: 0.4 MG/DL (ref 0–1)
BNP BLD-MCNC: 4070 PG/ML (ref 0–124.9)
BUN SERPL-MCNC: 22 MG/DL (ref 7–20)
CALCIUM SERPL-MCNC: 9.6 MG/DL (ref 8.4–10.2)
CHLORIDE SERPL-SCNC: 107 MMOL/L (ref 98–110)
CO2 SERPL-SCNC: 28 MMOL/L (ref 21–32)
CREAT SERPL-MCNC: 1.31 MG/DL (ref 0.66–1.25)
CREAT/UREA NIT SERPL: 17 (ref 12–20)
EOSINOPHIL # BLD AUTO: 0.26 X10(3)/MCL (ref 0.04–0.36)
EOSINOPHIL NFR BLD AUTO: 3.6 % (ref 0.7–7)
ERYTHROCYTE [DISTWIDTH] IN BLOOD BY AUTOMATED COUNT: 14.4 % (ref 11–14.5)
GFR SERPLBLD CREATININE-BSD FMLA CKD-EPI: 40 ML/MIN/1.73/M2
GLOBULIN SER-MCNC: 3.1 GM/DL (ref 2–3.9)
GLUCOSE SERPL-MCNC: 163 MG/DL (ref 70–115)
HCT VFR BLD AUTO: 28.6 % (ref 36–48)
HGB BLD-MCNC: 8.8 G/DL (ref 11.8–16)
IMM GRANULOCYTES # BLD AUTO: 0.02 X10(3)/MCL (ref 0–0.03)
IMM GRANULOCYTES NFR BLD AUTO: 0.3 % (ref 0–0.5)
LYMPHOCYTES # BLD AUTO: 1.36 X10(3)/MCL (ref 1.16–3.74)
LYMPHOCYTES NFR BLD AUTO: 18.8 % (ref 20–55)
MAGNESIUM SERPL-MCNC: 1.3 MG/DL (ref 1.8–2.4)
MCH RBC QN AUTO: 24.4 PG (ref 27–34)
MCHC RBC AUTO-ENTMCNC: 30.8 G/DL (ref 31–37)
MCV RBC AUTO: 79.4 FL (ref 79–99)
MONOCYTES # BLD AUTO: 0.55 X10(3)/MCL (ref 0.24–0.36)
MONOCYTES NFR BLD AUTO: 7.6 % (ref 4.7–12.5)
NEUTROPHILS # BLD AUTO: 4.98 X10(3)/MCL (ref 1.56–6.13)
NEUTROPHILS NFR BLD AUTO: 69 % (ref 37–73)
NRBC BLD AUTO-RTO: 0 %
PLATELET # BLD AUTO: 239 X10(3)/MCL (ref 140–371)
PMV BLD AUTO: 9.6 FL (ref 9.4–12.4)
POTASSIUM SERPL-SCNC: 4 MMOL/L (ref 3.5–5.1)
PROT SERPL-MCNC: 7.2 GM/DL (ref 6.3–8.2)
RBC # BLD AUTO: 3.6 X10(6)/MCL (ref 4–5.1)
SODIUM SERPL-SCNC: 140 MMOL/L (ref 136–145)
TROPONIN I SERPL-MCNC: 0.04 NG/ML (ref 0–0.03)
TROPONIN I SERPL-MCNC: 0.04 NG/ML (ref 0–0.03)
WBC # BLD AUTO: 7.22 X10(3)/MCL (ref 4–11.5)

## 2024-12-28 PROCEDURE — 83880 ASSAY OF NATRIURETIC PEPTIDE: CPT | Performed by: FAMILY MEDICINE

## 2024-12-28 PROCEDURE — 85025 COMPLETE CBC W/AUTO DIFF WBC: CPT | Performed by: FAMILY MEDICINE

## 2024-12-28 PROCEDURE — 25000242 PHARM REV CODE 250 ALT 637 W/ HCPCS: Performed by: FAMILY MEDICINE

## 2024-12-28 PROCEDURE — 96365 THER/PROPH/DIAG IV INF INIT: CPT

## 2024-12-28 PROCEDURE — 99285 EMERGENCY DEPT VISIT HI MDM: CPT | Mod: 25

## 2024-12-28 PROCEDURE — 63600175 PHARM REV CODE 636 W HCPCS: Performed by: FAMILY MEDICINE

## 2024-12-28 PROCEDURE — 96375 TX/PRO/DX INJ NEW DRUG ADDON: CPT

## 2024-12-28 PROCEDURE — 83735 ASSAY OF MAGNESIUM: CPT | Performed by: FAMILY MEDICINE

## 2024-12-28 PROCEDURE — 25000003 PHARM REV CODE 250: Performed by: FAMILY MEDICINE

## 2024-12-28 PROCEDURE — 96376 TX/PRO/DX INJ SAME DRUG ADON: CPT

## 2024-12-28 PROCEDURE — 96366 THER/PROPH/DIAG IV INF ADDON: CPT

## 2024-12-28 PROCEDURE — 80053 COMPREHEN METABOLIC PANEL: CPT | Performed by: FAMILY MEDICINE

## 2024-12-28 PROCEDURE — 84484 ASSAY OF TROPONIN QUANT: CPT | Performed by: FAMILY MEDICINE

## 2024-12-28 RX ORDER — HYDROMORPHONE HYDROCHLORIDE 1 MG/ML
0.5 INJECTION, SOLUTION INTRAMUSCULAR; INTRAVENOUS; SUBCUTANEOUS
Status: DISCONTINUED | OUTPATIENT
Start: 2024-12-28 | End: 2024-12-28

## 2024-12-28 RX ORDER — HYDROMORPHONE HYDROCHLORIDE 1 MG/ML
0.5 INJECTION, SOLUTION INTRAMUSCULAR; INTRAVENOUS; SUBCUTANEOUS
Status: COMPLETED | OUTPATIENT
Start: 2024-12-28 | End: 2024-12-28

## 2024-12-28 RX ORDER — MORPHINE SULFATE 15 MG/1
15 TABLET ORAL 2 TIMES DAILY
Qty: 10 TABLET | Refills: 0 | Status: SHIPPED | OUTPATIENT
Start: 2024-12-28

## 2024-12-28 RX ORDER — HYDRALAZINE HYDROCHLORIDE 20 MG/ML
10 INJECTION INTRAMUSCULAR; INTRAVENOUS
Status: COMPLETED | OUTPATIENT
Start: 2024-12-28 | End: 2024-12-28

## 2024-12-28 RX ORDER — NITROGLYCERIN 0.4 MG/1
0.4 TABLET SUBLINGUAL EVERY 5 MIN PRN
Status: DISCONTINUED | OUTPATIENT
Start: 2024-12-28 | End: 2024-12-28 | Stop reason: HOSPADM

## 2024-12-28 RX ORDER — MAGNESIUM SULFATE HEPTAHYDRATE 40 MG/ML
2 INJECTION, SOLUTION INTRAVENOUS
Status: COMPLETED | OUTPATIENT
Start: 2024-12-28 | End: 2024-12-28

## 2024-12-28 RX ORDER — ASPIRIN 325 MG
325 TABLET ORAL
Status: COMPLETED | OUTPATIENT
Start: 2024-12-28 | End: 2024-12-28

## 2024-12-28 RX ORDER — FUROSEMIDE 10 MG/ML
40 INJECTION INTRAMUSCULAR; INTRAVENOUS
Status: COMPLETED | OUTPATIENT
Start: 2024-12-28 | End: 2024-12-28

## 2024-12-28 RX ADMIN — NITROGLYCERIN 0.4 MG: 0.4 TABLET SUBLINGUAL at 11:12

## 2024-12-28 RX ADMIN — HYDROMORPHONE HYDROCHLORIDE 0.5 MG: 1 INJECTION, SOLUTION INTRAMUSCULAR; INTRAVENOUS; SUBCUTANEOUS at 01:12

## 2024-12-28 RX ADMIN — FUROSEMIDE 40 MG: 10 INJECTION, SOLUTION INTRAMUSCULAR; INTRAVENOUS at 01:12

## 2024-12-28 RX ADMIN — NITROGLYCERIN 1 INCH: 20 OINTMENT TOPICAL at 02:12

## 2024-12-28 RX ADMIN — NITROGLYCERIN 0.4 MG: 0.4 TABLET SUBLINGUAL at 01:12

## 2024-12-28 RX ADMIN — MAGNESIUM SULFATE HEPTAHYDRATE 2 G: 40 INJECTION, SOLUTION INTRAVENOUS at 12:12

## 2024-12-28 RX ADMIN — HYDROMORPHONE HYDROCHLORIDE 0.5 MG: 1 INJECTION, SOLUTION INTRAMUSCULAR; INTRAVENOUS; SUBCUTANEOUS at 12:12

## 2024-12-28 RX ADMIN — ASPIRIN 325 MG ORAL TABLET 325 MG: 325 PILL ORAL at 11:12

## 2024-12-28 RX ADMIN — HYDRALAZINE HYDROCHLORIDE 10 MG: 20 INJECTION INTRAMUSCULAR; INTRAVENOUS at 12:12

## 2024-12-28 NOTE — TELEMEDICINE CONSULT
[facility]  [facility]ult [facility]    Patient Name: Leatha Adames  Patient : 1935  MRN: [id number]  Admission Date: 2024  Hospital Length of Stay: 0 days  Code Status: No Order   Attending Provider: No att. providers found   Consulting Provider: Beka Jaramillo MD  Primary Care Physician: Dennys Linares MD  Principal Problem:<principal problem not specified>      Patient information was obtained from patient and ER records.     Consults    Chief Complaint: chest pain  HPI: Patient is an 88 yo female with CAD s/p PTCA stenting, PAD s/p PTA, severe aortic stenosis, HTN and hyperlipidemia.    Patient presents with chest pain that began last night. She says that the chest pain was substernal with radiation to the neck. It lasted for several minutes and then resolved spontaneously. She had recurrent episodes of chest pain this morning so she presented to the ER for further evaluation. She is nor chest pain free.    Patient says that she has discussed this chest pain with her cardiologist and he indicated that this was not dure to a cardiac etiology. She also stated that she and her family discussed her current cardiac issues of recurrent chest pain and severe aortic stenosis and all have agreed that they are not interested in performing any interventions or procedues.                 Past Medical History:   Diagnosis Date    Anemia of chronic illness     Anxiety and depression     CAD (coronary artery disease)     Chronic combined systolic and diastolic CHF (congestive heart failure)     CKD (chronic kidney disease) stage 4, GFR 15-29 ml/min     Colon polyp     Dementia     Diabetic polyneuropathy associated with type 2 diabetes mellitus 2023:  EMG nerve conduction study showed generalized mixed sensory-motor polyneuropathy affecting the upper and lower extremities consistent with diabetes.    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     Microhematuria     Mitral valve stenosis 08/04/2023 November 2, 2023 echocardiogram, moderate mitral stenosis with moderate posteriorly directed mitral valve regurgitation      Moderate mitral valve regurgitation 10/22/2024    November 2, 2023 echocardiogram moderate posteriorly directed mitral valve regurgitation      Nonrheumatic aortic valve insufficiency 10/22/2024    November 2, 2023 echocardiogram, mild-to-moderate with sclerotic aortic valve leaflets and mild AS      Nonrheumatic tricuspid valve regurgitation 10/22/2024    November 2, 2023 echocardiogram moderate to severe tricuspid regurgitation with right ventricular systolic pressure of 60 mmHg      Osteoporosis     Restless leg syndrome     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     Urge incontinence        Past Surgical History:   Procedure Laterality Date    CARDIAC CATHETERIZATION  07/24/2008    CHOLECYSTECTOMY      COLONOSCOPY      CYSTOCELE REPAIR  2001    EPIDURAL STEROID INJECTION  06/2020    ESOPHAGOGASTRODUODENOSCOPY (EGD) WITH DILATION  05/14/2021    HEMORRHOID SURGERY      HYSTERECTOMY      MASTECTOMY  1995    PERCUTANEOUS TRANSLUMINAL BALLOON ANGIOPLASTY OF CORONARY ARTERY  08/01/2017    PERIPHERAL ARTERIAL STENT GRAFT  09/12/2018       Review of patient's allergies indicates:   Allergen Reactions    Ace inhibitors Other (See Comments)    Amoxicillin-pot clavulanate Other (See Comments)    Celecoxib Other (See Comments)    Codeine     Hydrocodone-acetaminophen     Irbesartan Other (See Comments)    Penicillins        No current facility-administered medications on file prior to encounter.     Current Outpatient Medications on File Prior to Encounter   Medication Sig    acetaminophen (TYLENOL) 500 MG tablet Take 1,000 mg by mouth every 8 (eight) hours as needed for Pain.    albuterol (PROVENTIL/VENTOLIN HFA)  90 mcg/actuation inhaler 2 puffs 4 (four) times daily as needed.    aspirin 81 MG Chew aspirin 81 mg chewable tablet   Chew 1 tablet every day by oral route.    atorvastatin (LIPITOR) 40 MG tablet TAKE 1 TABLET DAILY    azelastine (ASTELIN) 137 mcg (0.1 %) nasal spray 1 spray (137 mcg total) by Nasal route 2 (two) times daily.    calcitRIOL (ROCALTROL) 0.5 MCG Cap TAKE 1 CAPSULE DAILY    colestipoL (COLESTID) 5 gram Pack Take 5 g by mouth 2 (two) times daily.    empagliflozin (JARDIANCE) 10 mg tablet Take 1 tablet (10 mg total) by mouth once daily.    fesoterodine (TOVIAZ) 4 mg Tb24 TAKE 1 TABLET DAILY    FIBER CHOICE ORAL Take by mouth 4 (four) times daily as needed. (Patient not taking: Reported on 11/4/2024)    furosemide (LASIX) 40 MG tablet Take 40 mg by mouth once daily. Takes 40mg Mwf AND 20mg on TTSS    isosorbide mononitrate (IMDUR) 30 MG 24 hr tablet Take 30 mg by mouth.    loratadine (CLARITIN) 10 mg tablet Take 10 mg by mouth once daily.    nitroGLYCERIN (NITROSTAT) 0.4 MG SL tablet Place 1 tablet (0.4 mg total) under the tongue every 5 (five) minutes as needed for Chest pain.    pantoprazole (PROTONIX) 40 MG tablet Take 1 tablet (40 mg total) by mouth once daily.    traMADoL (ULTRAM) 50 mg tablet Take 1 tablet (50 mg total) by mouth every 8 (eight) hours as needed for Pain.    UNABLE TO FIND medication name: CBD Tincture 0.5 under tongue every 4-6 hours for pain    UNABLE TO FIND medication name: Leg Cramps by Hylands 356 mg 4 times daily    venlafaxine (EFFEXOR-XR) 150 MG Cp24 TAKE 1 CAPSULE DAILY    vitamin D (VITAMIN D3) 1000 units Tab Take 1,000 Units by mouth once daily.    vitamin E 400 UNIT capsule Take 400 Units by mouth 2 (two) times daily.     Family History       Problem Relation (Age of Onset)    Breast cancer Mother    Epilepsy Father    Heart disease Father          Tobacco Use    Smoking status: Former     Passive exposure: Never    Smokeless tobacco: Never   Substance and Sexual Activity     Alcohol use: Not Currently    Drug use: Yes     Types: Other-see comments     Comment: CBD Drops and lotion    Sexual activity: Not Currently     Review of Systems   Constitutional:  Positive for activity change.   HENT: Negative.     Respiratory:  Positive for chest tightness and shortness of breath.    Cardiovascular:  Positive for chest pain.   Gastrointestinal: Negative.    Genitourinary: Negative.    Musculoskeletal:  Positive for arthralgias.   Neurological: Negative.    Psychiatric/Behavioral: Negative.       Objective:     Vital Signs (Most Recent):  Temp: 98 °F (36.7 °C) (12/28/24 1532)  Pulse: 74 (12/28/24 1532)  Resp: 18 (12/28/24 1532)  BP: (!) 173/85 (12/28/24 1532)  SpO2: 95 % (12/28/24 1532) Vital Signs (24h Range):  Temp:  [97.5 °F (36.4 °C)-98.1 °F (36.7 °C)] 98 °F (36.7 °C)  Pulse:  [71-85] 74  Resp:  [17-22] 18  SpO2:  [94 %-97 %] 95 %  BP: (127-186)/(76-95) 173/85     Weight: 56 kg (123 lb 7.3 oz)  Body mass index is 21.87 kg/m².    SpO2: 95 %       No intake or output data in the 24 hours ending 12/28/24 1653    Lines/Drains/Airways       None                   Physical Exam  Constitutional:       Appearance: Normal appearance. She is normal weight.   HENT:      Head: Normocephalic.      Mouth/Throat:      Mouth: Mucous membranes are moist.      Pharynx: Oropharynx is clear.   Eyes:      Extraocular Movements: Extraocular movements intact.      Pupils: Pupils are equal, round, and reactive to light.   Cardiovascular:      Rate and Rhythm: Normal rate and regular rhythm.      Heart sounds: Murmur heard.   Pulmonary:      Effort: Pulmonary effort is normal.      Breath sounds: Normal breath sounds.   Abdominal:      General: Abdomen is flat.   Skin:     Capillary Refill: Capillary refill takes 2 to 3 seconds.   Neurological:      General: No focal deficit present.      Mental Status: She is alert and oriented to person, place, and time.   Psychiatric:         Mood and Affect: Mood normal.  "        Significant Labs:    Recent Labs   Lab Result Units 12/28/24  1159 12/28/24  1340   Troponin-I ng/mL 0.042* 0.043*      No results for input(s): "TROPONINIHS" in the last 2160 hours.  Recent Lab Results         12/28/24  1340   12/28/24  1159        Albumin/Globulin Ratio   1.3       Albumin   4.1       ALP   42       ALT   15       Anion Gap   5.0       AST   24       Baso #   0.05       Basophil %   0.7       BILIRUBIN TOTAL   0.4       BUN   22       BUN/CREAT RATIO   17       Calcium   9.6       Chloride   107       CO2   28       Creatinine   1.31       eGFR   40  Comment:                      EGFR INTERPRETATION    Beginning 8/15/22 we are reporting the eGFRcr calculation as recommended by the [employer]. The eGFRcr equation has similar overall performance characteristics to the older equation, but the values may differ by more than 10% particularly at higher values of eGFRcr and younger adult ages.    NKF stages of chronic kidney disease (CKD)  Stage 1: Kidney damage with normal or increased eGFR (>90 mL/min/1.73 m^2)  Stage 2: Mild reduction in GFR (60-89 mL/min/1.73 m^2)  Stage 3a: Moderate reduction in GFR (45-59 mL/min/1.73 m^2)  Stage 3b: Moderate reduction in GFR (30-44 mL/min/1.73 m^2)  Stage 4: Severe reduction in GFR (15-29 mL/min/1.73 m^2)  Stage 5: Kidney failure (GFR <15 mL/min/1.73 m^2)           Eos #   0.26       Eos %   3.6       Globulin, Total   3.1       Glucose   163       Hematocrit   28.6       Hemoglobin   8.8       Immature Grans (Abs)   0.02       Immature Granulocytes   0.3       Lymph #   1.36       LYMPH %   18.8       Magnesium    1.30       MCH   24.4       MCHC   30.8       MCV   79.4       Mono #   0.55       Mono %   7.6       MPV   9.6       Neut #   4.98       Neut %   69.0       nRBC   0.0       Platelet Count   239       Potassium   4.0       ProBNP   4,070.0  Comment:   For ambulatory patients presenting to outpatient facilities with clinical " "suspicion of HF not previously diagnosed and at least one sign, symptom or risk factor for HF, NT-proBNP II test results should be interpreted as indicated below:    <125(pg/mL):"Negative: Heart Failure Unlikely"    >=125(pg/mL):"Consider Heart Failure as well as other causes of NT-proBNP elevation"             PROTEIN TOTAL   7.2       RBC   3.60       RDW   14.4       Sodium   140       Troponin I 0.043   0.042       WBC   7.22               Significant Imaging:  Imaging Results              X-Ray Chest 1 View (Final result)  Result time 12/28/24 14:42:28      Final result by Temo Stratton MD (12/28/24 14:42:28)                   Impression:      1. Pulmonary vascular congestion/interstitial edema with small left pleural effusion.      Electronically signed by: Temo Stratton MD  Date:    12/28/2024  Time:    14:42               Narrative:    EXAMINATION:  XR CHEST 1 VIEW    CLINICAL HISTORY:  Chest pain, dyspnea.    TECHNIQUE:  1 view of the chest.    COMPARISON:  02/09/2023    FINDINGS:  There is blunting left costophrenic angle compatible with small pleural effusion.  There is bilateral interstitial prominence suspicious for pulmonary vascular congestion/interstitial edema.  There is no pneumothorax.  Vascular calcifications are present..    The cardiac silhouette is mildly enlarged.    No acute displaced fracture is seen.                                            Lab Results   Component Value Date    CHOL 150 11/01/2024    CHOL 140 04/11/2024    CHOL 150 09/11/2023     Lab Results   Component Value Date    HDL 67 (H) 11/01/2024    HDL 61 (H) 04/11/2024    HDL 55 09/11/2023     No results found for: "LDLCALC"  Lab Results   Component Value Date    TRIG 125 11/01/2024    TRIG 133 04/11/2024    TRIG 164 09/11/2023     No results found for: "CHOLHDL"    MEDICATIONS:     Current Facility-Administered Medications:     nitroGLYCERIN SL tablet 0.4 mg, 0.4 mg, Sublingual, Q5 Min PRN, Julio Resendiz MD, 0.4 mg at " 12/28/24 1154    nitroGLYCERIN SL tablet 0.4 mg, 0.4 mg, Sublingual, Q5 Min PRN, Julio Resendiz MD, 0.4 mg at 12/28/24 1334    Current Outpatient Medications:     acetaminophen (TYLENOL) 500 MG tablet, Take 1,000 mg by mouth every 8 (eight) hours as needed for Pain., Disp: , Rfl:     albuterol (PROVENTIL/VENTOLIN HFA) 90 mcg/actuation inhaler, 2 puffs 4 (four) times daily as needed., Disp: , Rfl:     aspirin 81 MG Chew, aspirin 81 mg chewable tablet  Chew 1 tablet every day by oral route., Disp: , Rfl:     atorvastatin (LIPITOR) 40 MG tablet, TAKE 1 TABLET DAILY, Disp: 90 tablet, Rfl: 3    azelastine (ASTELIN) 137 mcg (0.1 %) nasal spray, 1 spray (137 mcg total) by Nasal route 2 (two) times daily., Disp: 90 mL, Rfl: 3    calcitRIOL (ROCALTROL) 0.5 MCG Cap, TAKE 1 CAPSULE DAILY, Disp: 90 capsule, Rfl: 3    colestipoL (COLESTID) 5 gram Pack, Take 5 g by mouth 2 (two) times daily., Disp: , Rfl:     empagliflozin (JARDIANCE) 10 mg tablet, Take 1 tablet (10 mg total) by mouth once daily., Disp: 90 tablet, Rfl: 3    fesoterodine (TOVIAZ) 4 mg Tb24, TAKE 1 TABLET DAILY, Disp: 90 tablet, Rfl: 3    FIBER CHOICE ORAL, Take by mouth 4 (four) times daily as needed. (Patient not taking: Reported on 11/4/2024), Disp: , Rfl:     furosemide (LASIX) 40 MG tablet, Take 40 mg by mouth once daily. Takes 40mg Mwf AND 20mg on TTSS, Disp: , Rfl:     isosorbide mononitrate (IMDUR) 30 MG 24 hr tablet, Take 30 mg by mouth., Disp: , Rfl:     loratadine (CLARITIN) 10 mg tablet, Take 10 mg by mouth once daily., Disp: , Rfl:     morphine (MSIR) 15 MG tablet, Take 1 tablet (15 mg total) by mouth 2 (two) times daily., Disp: 10 tablet, Rfl: 0    nitroGLYCERIN (NITROSTAT) 0.4 MG SL tablet, Place 1 tablet (0.4 mg total) under the tongue every 5 (five) minutes as needed for Chest pain., Disp: 30 tablet, Rfl: 5    pantoprazole (PROTONIX) 40 MG tablet, Take 1 tablet (40 mg total) by mouth once daily., Disp: 90 tablet, Rfl: 4    traMADoL (ULTRAM) 50  mg tablet, Take 1 tablet (50 mg total) by mouth every 8 (eight) hours as needed for Pain., Disp: 30 tablet, Rfl: 0    UNABLE TO FIND, medication name: CBD Tincture 0.5 under tongue every 4-6 hours for pain, Disp: , Rfl:     UNABLE TO FIND, medication name: Leg Cramps by Hylands 356 mg 4 times daily, Disp: , Rfl:     venlafaxine (EFFEXOR-XR) 150 MG Cp24, TAKE 1 CAPSULE DAILY, Disp: 90 capsule, Rfl: 3    vitamin D (VITAMIN D3) 1000 units Tab, Take 1,000 Units by mouth once daily., Disp: , Rfl:     vitamin E 400 UNIT capsule, Take 400 Units by mouth 2 (two) times daily., Disp: , Rfl:       Assessment and Plan:  Chest Pain: Patient has recurrent chest pain and her troponin is flat 0.04 without any acute changes on her ECG. She is currently chest pain free. Patient is accompanied by her family in her room and her daughter is on the phone. It should be noted that patient and her family are not interested in pursuing any interventional/surgical options for her CAD or her aortic stenosis. She would like to be discharged to home.  Severe aortic stenosis: conservative medical therapy  HTN: BP remains elevated. Resume medical therapy. Follow-up with primary cardiologist for medication adjustments     There are no hospital problems to display for this patient.      VTE Risk Mitigation (From admission, onward)      None            Thank you for your consult.          Beka Jaramillo MD  Cardiology  [facility]  12/28/2024

## 2024-12-28 NOTE — ED PROVIDER NOTES
Encounter Date: 12/28/2024       History     Chief Complaint   Patient presents with    Chest Pain     PRESENTS TO ED PER [facility] FROM HOME WITH C/O CHEST HEAVINESS ONSET YESTERDAY AT 2100 RELIEVED WITH X1 NITRO. STATES TOOK NITRO X1 THIS MORNING WITH NO RELIEF OF CHEST HEAVINESS      Patient presents for evaluation of chest pain.  Patient notes having chest pain for the past 8 hours.  Chest pain is present constantly.  Patient describes the pain is aching moderate pain that is proximally 8/10 present emergency room.  Patient denies having any aggravating or relieving features to chest pain.  Patient denies fevers chills cough congestion or any other associated symptoms at present.    The history is provided by the patient.     Review of patient's allergies indicates:   Allergen Reactions    Ace inhibitors Other (See Comments)    Amoxicillin-pot clavulanate Other (See Comments)    Celecoxib Other (See Comments)    Codeine     Hydrocodone-acetaminophen     Irbesartan Other (See Comments)    Penicillins      Past Medical History:   Diagnosis Date    Anemia of chronic illness     Anxiety and depression     CAD (coronary artery disease)     Chronic combined systolic and diastolic CHF (congestive heart failure)     CKD (chronic kidney disease) stage 4, GFR 15-29 ml/min     Colon polyp     Dementia     Diabetic polyneuropathy associated with type 2 diabetes mellitus 08/31/2023 August 17, 2023:  EMG nerve conduction study showed generalized mixed sensory-motor polyneuropathy affecting the upper and lower extremities consistent with diabetes.    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     Microhematuria     Mitral valve stenosis 08/04/2023 November 2, 2023 echocardiogram, moderate mitral stenosis with moderate posteriorly directed mitral valve regurgitation      Moderate mitral  valve regurgitation 10/22/2024    November 2, 2023 echocardiogram moderate posteriorly directed mitral valve regurgitation      Nonrheumatic aortic valve insufficiency 10/22/2024    November 2, 2023 echocardiogram, mild-to-moderate with sclerotic aortic valve leaflets and mild AS      Nonrheumatic tricuspid valve regurgitation 10/22/2024    November 2, 2023 echocardiogram moderate to severe tricuspid regurgitation with right ventricular systolic pressure of 60 mmHg      Osteoporosis     Restless leg syndrome     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     Urge incontinence      Past Surgical History:   Procedure Laterality Date    CARDIAC CATHETERIZATION  07/24/2008    CHOLECYSTECTOMY      COLONOSCOPY      CYSTOCELE REPAIR  2001    EPIDURAL STEROID INJECTION  06/2020    ESOPHAGOGASTRODUODENOSCOPY (EGD) WITH DILATION  05/14/2021    HEMORRHOID SURGERY      HYSTERECTOMY      MASTECTOMY  1995    PERCUTANEOUS TRANSLUMINAL BALLOON ANGIOPLASTY OF CORONARY ARTERY  08/01/2017    PERIPHERAL ARTERIAL STENT GRAFT  09/12/2018     Family History   Problem Relation Name Age of Onset    Breast cancer Mother      Heart disease Father      Epilepsy Father       Social History     Tobacco Use    Smoking status: Former     Passive exposure: Never    Smokeless tobacco: Never   Substance Use Topics    Alcohol use: Not Currently    Drug use: Yes     Types: Other-see comments     Comment: CBD Drops and lotion     Review of Systems   Constitutional: Negative.    HENT: Negative.     Eyes: Negative.    Respiratory: Negative.     Cardiovascular:  Positive for chest pain.   Gastrointestinal: Negative.    Endocrine: Negative.    Genitourinary: Negative.    Musculoskeletal: Negative.    Skin: Negative.    Allergic/Immunologic: Negative.    Neurological: Negative.    Hematological: Negative.    Psychiatric/Behavioral: Negative.         Physical Exam     Initial Vitals [12/28/24 1139]   BP Pulse Resp Temp SpO2   (!) 186/95 78 18 97.5 °F  (36.4 °C) 96 %      MAP       --         Physical Exam    Vitals reviewed.  Constitutional: She appears well-developed and well-nourished. She is not diaphoretic. No distress.   HENT:   Head: Normocephalic and atraumatic. Mouth/Throat: No oropharyngeal exudate.   Eyes: EOM are normal. Pupils are equal, round, and reactive to light. Right eye exhibits no discharge. Left eye exhibits no discharge.   Neck: Neck supple. No thyromegaly present. No tracheal deviation present. No JVD present.   Normal range of motion.  Cardiovascular:  Normal rate, regular rhythm and normal heart sounds.     Exam reveals no friction rub.       No murmur heard.  Pulmonary/Chest: Breath sounds normal. No stridor. No respiratory distress. She has no wheezes. She has no rhonchi.   Abdominal: Abdomen is soft. Bowel sounds are normal. She exhibits no distension. There is no abdominal tenderness. There is no rebound and no guarding.   Musculoskeletal:         General: No tenderness or edema. Normal range of motion.      Cervical back: Normal range of motion and neck supple.     Neurological: She is alert and oriented to person, place, and time. She has normal reflexes. GCS score is 15. GCS eye subscore is 4. GCS verbal subscore is 5. GCS motor subscore is 6.   Skin: Skin is warm. Capillary refill takes less than 2 seconds.   Psychiatric: She has a normal mood and affect.         ED Course   Procedures  Labs Reviewed   COMPREHENSIVE METABOLIC PANEL - Abnormal       Result Value    Sodium 140      Potassium 4.0      Chloride 107      CO2 28      Glucose 163 (*)     Blood Urea Nitrogen 22 (*)     Creatinine 1.31 (*)     Calcium 9.6      Protein Total 7.2      Albumin 4.1      Globulin 3.1      Albumin/Globulin Ratio 1.3      Bilirubin Total 0.4      ALP 42 (*)     ALT 15      AST 24      eGFR 40      Anion Gap 5.0      BUN/Creatinine Ratio 17     MAGNESIUM - Abnormal    Magnesium Level 1.30 (*)    NT-PRO NATRIURETIC PEPTIDE - Abnormal    ProBNP  4,070.0 (*)    TROPONIN I - Abnormal    Troponin-I 0.042 (*)    CBC WITH DIFFERENTIAL - Abnormal    WBC 7.22      RBC 3.60 (*)     Hgb 8.8 (*)     Hct 28.6 (*)     MCV 79.4      MCH 24.4 (*)     MCHC 30.8 (*)     RDW 14.4      Platelet 239      MPV 9.6      Neut % 69.0      Lymph % 18.8 (*)     Mono % 7.6      Eos % 3.6      Basophil % 0.7      Lymph # 1.36      Neut # 4.98      Mono # 0.55 (*)     Eos # 0.26      Baso # 0.05      IG# 0.02      IG% 0.3      NRBC% 0.0     TROPONIN I - Abnormal    Troponin-I 0.043 (*)    CBC W/ AUTO DIFFERENTIAL    Narrative:     The following orders were created for panel order CBC auto differential.  Procedure                               Abnormality         Status                     ---------                               -----------         ------                     CBC with Differential[[id number]]       Abnormal            Final result                 Please view results for these tests on the individual orders.     EKG Readings: (Independently Interpreted)   Normal sinus rhythm at 79 beats per minute.  ST depression and T-wave inversion present least 2 3 AVF V6.  ST elevation present in AVR.  T-wave inversion pr also pleasant lead 1.  EKG compared to previous EKG in May essentially unchanged.       Imaging Results              X-Ray Chest 1 View (Final result)  Result time 12/28/24 14:42:28      Final result by Temo Stratton MD (12/28/24 14:42:28)                   Impression:      1. Pulmonary vascular congestion/interstitial edema with small left pleural effusion.      Electronically signed by: Temo Stratton MD  Date:    12/28/2024  Time:    14:42               Narrative:    EXAMINATION:  XR CHEST 1 VIEW    CLINICAL HISTORY:  Chest pain, dyspnea.    TECHNIQUE:  1 view of the chest.    COMPARISON:  02/09/2023    FINDINGS:  There is blunting left costophrenic angle compatible with small pleural effusion.  There is bilateral interstitial prominence suspicious for pulmonary  vascular congestion/interstitial edema.  There is no pneumothorax.  Vascular calcifications are present..    The cardiac silhouette is mildly enlarged.    No acute displaced fracture is seen.                                       Medications   nitroGLYCERIN SL tablet 0.4 mg (0.4 mg Sublingual Given 12/28/24 1154)   nitroGLYCERIN SL tablet 0.4 mg (0.4 mg Sublingual Given 12/28/24 1334)   aspirin tablet 325 mg (325 mg Oral Given 12/28/24 1154)   HYDROmorphone injection 0.5 mg (0.5 mg Intravenous Given 12/28/24 1211)   magnesium sulfate 2g in water 50mL IVPB (premix) (0 g Intravenous Stopped 12/28/24 1433)   hydrALAZINE injection 10 mg (10 mg Intravenous Given 12/28/24 1257)   furosemide injection 40 mg (40 mg Intravenous Given 12/28/24 1334)   HYDROmorphone injection 0.5 mg (0.5 mg Intravenous Given 12/28/24 1334)   nitroGLYCERIN 2% TD oint ointment 1 inch (1 inch Transdermal Given 12/28/24 1457)     Medical Decision Making  Amount and/or Complexity of Data Reviewed  Labs: ordered.  Radiology: ordered.    Risk  OTC drugs.  Prescription drug management.                                      Clinical Impression:  Final diagnoses:  [R07.9] Chest pain  [R06.00] Dyspnea  [I50.9] Congestive heart failure, unspecified HF chronicity, unspecified heart failure type (Primary)  [E83.42] Hypomagnesemia          ED Disposition Condition    Discharge Stable          ED Prescriptions       Medication Sig Dispense Start Date End Date Auth. Provider    morphine (MSIR) 15 MG tablet Take 1 tablet (15 mg total) by mouth 2 (two) times daily. 10 tablet 12/28/2024 -- Julio Resendiz MD          Follow-up Information    None          Julio Resendiz MD  12/28/24 2653

## 2024-12-30 LAB
OHS QRS DURATION: 130 MS
OHS QTC CALCULATION: 479 MS

## 2025-01-02 ENCOUNTER — OFFICE VISIT (OUTPATIENT)
Dept: FAMILY MEDICINE | Facility: CLINIC | Age: 89
End: 2025-01-02
Payer: MEDICARE

## 2025-01-02 VITALS
HEIGHT: 63 IN | TEMPERATURE: 99 F | HEART RATE: 84 BPM | OXYGEN SATURATION: 95 % | DIASTOLIC BLOOD PRESSURE: 60 MMHG | WEIGHT: 123.81 LBS | BODY MASS INDEX: 21.94 KG/M2 | SYSTOLIC BLOOD PRESSURE: 142 MMHG

## 2025-01-02 DIAGNOSIS — N18.4 ANEMIA DUE TO STAGE 4 CHRONIC KIDNEY DISEASE TREATED WITH ERYTHROPOIETIN: ICD-10-CM

## 2025-01-02 DIAGNOSIS — I34.2 NONRHEUMATIC MITRAL VALVE STENOSIS: ICD-10-CM

## 2025-01-02 DIAGNOSIS — D63.1 ANEMIA DUE TO STAGE 4 CHRONIC KIDNEY DISEASE TREATED WITH ERYTHROPOIETIN: ICD-10-CM

## 2025-01-02 DIAGNOSIS — E83.42 HYPOMAGNESEMIA: ICD-10-CM

## 2025-01-02 DIAGNOSIS — I35.0 SEVERE AORTIC VALVE STENOSIS: Primary | ICD-10-CM

## 2025-01-02 DIAGNOSIS — I20.9 ANGINA PECTORIS: ICD-10-CM

## 2025-01-02 LAB
ANION GAP SERPL CALC-SCNC: 7 MEQ/L (ref 2–13)
BASOPHILS # BLD AUTO: 0.06 X10(3)/MCL (ref 0.01–0.08)
BASOPHILS NFR BLD AUTO: 0.8 % (ref 0.1–1.2)
BUN SERPL-MCNC: 24 MG/DL (ref 7–20)
CALCIUM SERPL-MCNC: 10 MG/DL (ref 8.4–10.2)
CHLORIDE SERPL-SCNC: 101 MMOL/L (ref 98–110)
CO2 SERPL-SCNC: 29 MMOL/L (ref 21–32)
CREAT SERPL-MCNC: 1.47 MG/DL (ref 0.66–1.25)
CREAT/UREA NIT SERPL: 16 (ref 12–20)
EOSINOPHIL # BLD AUTO: 0.34 X10(3)/MCL (ref 0.04–0.36)
EOSINOPHIL NFR BLD AUTO: 4.4 % (ref 0.7–7)
ERYTHROCYTE [DISTWIDTH] IN BLOOD BY AUTOMATED COUNT: 14.8 % (ref 11–14.5)
FERRITIN SERPL-MCNC: 11.6 NG/ML (ref 6.24–264)
GFR SERPLBLD CREATININE-BSD FMLA CKD-EPI: 34 ML/MIN/1.73/M2
GLUCOSE SERPL-MCNC: 168 MG/DL (ref 70–115)
HCT VFR BLD AUTO: 28.8 % (ref 36–48)
HGB BLD-MCNC: 9.1 G/DL (ref 11.8–16)
IMM GRANULOCYTES # BLD AUTO: 0.03 X10(3)/MCL (ref 0–0.03)
IMM GRANULOCYTES NFR BLD AUTO: 0.4 % (ref 0–0.5)
LYMPHOCYTES # BLD AUTO: 1.55 X10(3)/MCL (ref 1.16–3.74)
LYMPHOCYTES NFR BLD AUTO: 19.9 % (ref 20–55)
MAGNESIUM SERPL-MCNC: 1.4 MG/DL (ref 1.8–2.4)
MCH RBC QN AUTO: 24.5 PG (ref 27–34)
MCHC RBC AUTO-ENTMCNC: 31.6 G/DL (ref 31–37)
MCV RBC AUTO: 77.6 FL (ref 79–99)
MONOCYTES # BLD AUTO: 0.61 X10(3)/MCL (ref 0.24–0.36)
MONOCYTES NFR BLD AUTO: 7.8 % (ref 4.7–12.5)
NEUTROPHILS # BLD AUTO: 5.21 X10(3)/MCL (ref 1.56–6.13)
NEUTROPHILS NFR BLD AUTO: 66.7 % (ref 37–73)
NRBC BLD AUTO-RTO: 0 %
PLATELET # BLD AUTO: 315 X10(3)/MCL (ref 140–371)
PMV BLD AUTO: 10 FL (ref 9.4–12.4)
POTASSIUM SERPL-SCNC: 4.3 MMOL/L (ref 3.5–5.1)
RBC # BLD AUTO: 3.71 X10(6)/MCL (ref 4–5.1)
SODIUM SERPL-SCNC: 137 MMOL/L (ref 136–145)
WBC # BLD AUTO: 7.8 X10(3)/MCL (ref 4–11.5)

## 2025-01-02 PROCEDURE — 99214 OFFICE O/P EST MOD 30 MIN: CPT | Mod: ,,, | Performed by: FAMILY MEDICINE

## 2025-01-02 PROCEDURE — 82728 ASSAY OF FERRITIN: CPT | Performed by: FAMILY MEDICINE

## 2025-01-02 PROCEDURE — 83735 ASSAY OF MAGNESIUM: CPT | Performed by: FAMILY MEDICINE

## 2025-01-02 PROCEDURE — 80048 BASIC METABOLIC PNL TOTAL CA: CPT | Performed by: FAMILY MEDICINE

## 2025-01-02 PROCEDURE — 85025 COMPLETE CBC W/AUTO DIFF WBC: CPT | Performed by: FAMILY MEDICINE

## 2025-01-02 PROCEDURE — 83550 IRON BINDING TEST: CPT | Performed by: FAMILY MEDICINE

## 2025-01-02 RX ORDER — ISOSORBIDE MONONITRATE 60 MG/1
60 TABLET, EXTENDED RELEASE ORAL DAILY
Qty: 90 TABLET | Refills: 3 | Status: SHIPPED | OUTPATIENT
Start: 2025-01-02 | End: 2026-01-02

## 2025-01-02 RX ORDER — CALCIUM CARBONATE 300MG(750)
1 TABLET,CHEWABLE ORAL 2 TIMES DAILY
Start: 2025-01-02 | End: 2026-01-02

## 2025-01-02 NOTE — ASSESSMENT & PLAN NOTE
She is on maximum medical therapy     We will discuss further with Cardiology.  Consider hospice if no interventions can be performed

## 2025-01-02 NOTE — ASSESSMENT & PLAN NOTE
On maximal medical therapy.  I believe that her mitral valve stenosis is also contributing to her degree of angina    We will discuss further with Cardiology.  If no interventions can be performed, consider hospice

## 2025-01-02 NOTE — ASSESSMENT & PLAN NOTE
On aspirin, statin, Plavix, Imdur    In light of her symptoms, we will try increasing Imdur to 60 mg daily    I believe that her angina is primarily related to her degree of aortic stenosis and exacerbated by multiple other comorbidities.    We will discuss further with Cardiology.  If no intervention can be performed, consider hospice

## 2025-01-02 NOTE — PROGRESS NOTES
"SUBJECTIVE:  HPI    Leatha Adames is a 89 y.o. female here for ER followup chest pain.     She went to the emergency room on December 28, 2024 with anginal chest pain.  She had pain that was 8/10 in intensity and was relieved with 3 doses of immediate release nitroglycerin.  She received some intravenous Lasix as well as magnesium.  Cardiology consultation was obtained in the emergency room and they ruled out an acute coronary syndrome.    Since returning home, she has continued to have anginal type chest pain that is currently 4/10 in intensity but is 2/10 in intensity at baseline.  She has pain that worsens with exertion in his associated with shortness a breath.  She denies any peripheral edema.    Curts allergies, medications, history, and problem list were updated as appropriate.    ROS:  Pertinent ROS as above, otherwise negative    OBJECTIVE:  Vital signs  Visit Vitals  BP (!) 142/60 (BP Location: Right arm)   Pulse 84   Temp 98.6 °F (37 °C) (Temporal)   Ht 5' 2.99" (1.6 m)   Wt 56.2 kg (123 lb 12.8 oz)   SpO2 95%   BMI 21.94 kg/m²          PHYSICAL EXAM:  General: Awake, alert, no acute distress  ENT: Bilateral external auditory canals and tympanic membranes normal except for some soft cerumen in both ears  Cardiovascular: Regular rate and rhythm with a 3/6 systolic murmur and a 2/6 diastolic murmur present  Respiratory: Clear to auscultation bilaterally, normal respiratory effort, no crackles  Extremities: No peripheral edema      ASSESSMENT/PLAN:  1. Severe aortic valve stenosis  Overview:  October 24, 2024 echocardiogram:  EF 55%   Aortic valve area 0.5 sq cm  Moderate to severe tricuspid regurgitation  Right ventricular systolic pressure 72 mmHg  Severe mitral stenosis and moderate mitral regurgitation    Assessment & Plan:  She is on maximum medical therapy     We will discuss further with Cardiology.  Consider hospice if no interventions can be performed      2. Nonrheumatic mitral valve " stenosis  Overview:  November 2, 2023 echocardiogram, moderate mitral stenosis with moderate posteriorly directed mitral valve regurgitation    October 24, 2024 echocardiogram:  EF 55%   Aortic valve area 0.5 sq cm  Moderate to severe tricuspid regurgitation  Right ventricular systolic pressure 72 mmHg  Severe mitral stenosis and moderate mitral regurgitation      Assessment & Plan:  On maximal medical therapy.  I believe that her mitral valve stenosis is also contributing to her degree of angina    We will discuss further with Cardiology.  If no interventions can be performed, consider hospice      3. Anemia due to stage 4 chronic kidney disease treated with erythropoietin  Overview:  Lab Results   Component Value Date    WBC 7.37 11/01/2024    HGB 9.5 (L) 11/01/2024    HCT 29.4 (L) 11/01/2024    MCV 82.8 11/01/2024     11/01/2024           Assessment & Plan:  On erythropoietin    Check CBC and iron studies    Her anemia maybe worsening her angina symptoms    Orders:  -     CBC Auto Differential; Future; Expected date: 01/02/2025  -     Ferritin; Future; Expected date: 01/02/2025  -     Iron and TIBC; Future; Expected date: 01/02/2025    4. Angina pectoris  Assessment & Plan:  On aspirin, statin, Plavix, Imdur    In light of her symptoms, we will try increasing Imdur to 60 mg daily    I believe that her angina is primarily related to her degree of aortic stenosis and exacerbated by multiple other comorbidities.    We will discuss further with Cardiology.  If no intervention can be performed, consider hospice    Orders:  -     isosorbide mononitrate (IMDUR) 60 MG 24 hr tablet; Take 1 tablet (60 mg total) by mouth once daily.  Dispense: 90 tablet; Refill: 3    5. Hypomagnesemia  Assessment & Plan:  Start magnesium oxide 400 mg b.i.d.    Check magnesium level today    Orders:  -     Basic Metabolic Panel; Future; Expected date: 01/02/2025  -     Magnesium; Future; Expected date: 01/02/2025  -     magnesium  oxide 400 mg magnesium Tab; Take 1 tablet by mouth 2 (two) times a day.      Return to clinic as scheduled in February, sooner if needed after discussion with Cardiology

## 2025-01-03 LAB
IRON SATN MFR SERPL: 11 % (ref 20–50)
IRON SERPL-MCNC: 36 UG/DL (ref 50–170)
TIBC SERPL-MCNC: 294 UG/DL (ref 70–310)
TIBC SERPL-MCNC: 330 UG/DL (ref 250–450)
TRANSFERRIN SERPL-MCNC: 305 MG/DL

## 2025-01-06 ENCOUNTER — INFUSION (OUTPATIENT)
Facility: HOSPITAL | Age: 89
End: 2025-01-06
Attending: INTERNAL MEDICINE
Payer: MEDICARE

## 2025-01-06 VITALS
BODY MASS INDEX: 22.63 KG/M2 | OXYGEN SATURATION: 99 % | WEIGHT: 123 LBS | DIASTOLIC BLOOD PRESSURE: 75 MMHG | HEIGHT: 62 IN | RESPIRATION RATE: 18 BRPM | HEART RATE: 88 BPM | TEMPERATURE: 98 F | SYSTOLIC BLOOD PRESSURE: 157 MMHG

## 2025-01-06 DIAGNOSIS — D63.1 ANEMIA DUE TO STAGE 4 CHRONIC KIDNEY DISEASE TREATED WITH ERYTHROPOIETIN: Primary | ICD-10-CM

## 2025-01-06 DIAGNOSIS — N18.4 ANEMIA DUE TO STAGE 4 CHRONIC KIDNEY DISEASE TREATED WITH ERYTHROPOIETIN: Primary | ICD-10-CM

## 2025-01-06 PROCEDURE — 63600175 PHARM REV CODE 636 W HCPCS: Mod: EC

## 2025-01-06 PROCEDURE — 96372 THER/PROPH/DIAG INJ SC/IM: CPT

## 2025-01-06 RX ADMIN — EPOETIN ALFA 20000 UNITS: 20000 SOLUTION INTRAVENOUS; SUBCUTANEOUS at 10:01

## 2025-01-07 ENCOUNTER — TELEPHONE (OUTPATIENT)
Dept: FAMILY MEDICINE | Facility: CLINIC | Age: 89
End: 2025-01-07
Payer: MEDICARE

## 2025-01-07 DIAGNOSIS — I20.9 ANGINA PECTORIS: ICD-10-CM

## 2025-01-07 RX ORDER — ISOSORBIDE MONONITRATE 30 MG/1
30 TABLET, EXTENDED RELEASE ORAL 2 TIMES DAILY
Qty: 90 TABLET | Refills: 3 | Status: SHIPPED | OUTPATIENT
Start: 2025-01-07 | End: 2026-01-07

## 2025-01-07 NOTE — TELEPHONE ENCOUNTER
[facility] called stating that pt's furosemide is still in the mail somewhere. States that she is out as of today & is needing about a week's worth to [place] & she will pay cash.

## 2025-02-03 ENCOUNTER — LAB VISIT (OUTPATIENT)
Dept: LAB | Facility: HOSPITAL | Age: 89
End: 2025-02-03
Attending: INTERNAL MEDICINE
Payer: MEDICARE

## 2025-02-03 ENCOUNTER — INFUSION (OUTPATIENT)
Facility: HOSPITAL | Age: 89
End: 2025-02-03
Attending: INTERNAL MEDICINE
Payer: MEDICARE

## 2025-02-03 VITALS
DIASTOLIC BLOOD PRESSURE: 74 MMHG | TEMPERATURE: 98 F | HEIGHT: 62 IN | RESPIRATION RATE: 18 BRPM | OXYGEN SATURATION: 98 % | WEIGHT: 118 LBS | HEART RATE: 75 BPM | SYSTOLIC BLOOD PRESSURE: 157 MMHG | BODY MASS INDEX: 21.71 KG/M2

## 2025-02-03 DIAGNOSIS — N18.4 ANEMIA DUE TO STAGE 4 CHRONIC KIDNEY DISEASE TREATED WITH ERYTHROPOIETIN: Primary | ICD-10-CM

## 2025-02-03 DIAGNOSIS — D50.0 IRON DEFICIENCY ANEMIA DUE TO CHRONIC BLOOD LOSS: ICD-10-CM

## 2025-02-03 DIAGNOSIS — N18.32 STAGE 3B CHRONIC KIDNEY DISEASE: ICD-10-CM

## 2025-02-03 DIAGNOSIS — D63.1 ANEMIA DUE TO STAGE 4 CHRONIC KIDNEY DISEASE TREATED WITH ERYTHROPOIETIN: ICD-10-CM

## 2025-02-03 DIAGNOSIS — E11.59 TYPE 2 DIABETES MELLITUS WITH OTHER CIRCULATORY COMPLICATIONS: ICD-10-CM

## 2025-02-03 DIAGNOSIS — N18.4 ANEMIA DUE TO STAGE 4 CHRONIC KIDNEY DISEASE TREATED WITH ERYTHROPOIETIN: ICD-10-CM

## 2025-02-03 DIAGNOSIS — D63.1 ANEMIA DUE TO STAGE 4 CHRONIC KIDNEY DISEASE TREATED WITH ERYTHROPOIETIN: Primary | ICD-10-CM

## 2025-02-03 LAB
ALBUMIN SERPL-MCNC: 4.5 G/DL (ref 3.4–5)
ALBUMIN/GLOB SERPL: 1.6 RATIO
ALP SERPL-CCNC: 45 UNIT/L (ref 50–144)
ALT SERPL-CCNC: 14 UNIT/L (ref 1–45)
ANION GAP SERPL CALC-SCNC: 6 MEQ/L (ref 2–13)
AST SERPL-CCNC: 27 UNIT/L (ref 14–36)
BASOPHILS # BLD AUTO: 0.05 X10(3)/MCL (ref 0.01–0.08)
BASOPHILS NFR BLD AUTO: 0.6 % (ref 0.1–1.2)
BILIRUB SERPL-MCNC: 0.3 MG/DL (ref 0–1)
BUN SERPL-MCNC: 34 MG/DL (ref 7–20)
CALCIUM SERPL-MCNC: 9.8 MG/DL (ref 8.4–10.2)
CHLORIDE SERPL-SCNC: 98 MMOL/L (ref 98–110)
CO2 SERPL-SCNC: 31 MMOL/L (ref 21–32)
CREAT SERPL-MCNC: 2.05 MG/DL (ref 0.66–1.25)
CREAT UR-MCNC: 137.7 MG/DL (ref 45–106)
CREAT/UREA NIT SERPL: 17 (ref 12–20)
EOSINOPHIL # BLD AUTO: 0.27 X10(3)/MCL (ref 0.04–0.36)
EOSINOPHIL NFR BLD AUTO: 3 % (ref 0.7–7)
ERYTHROCYTE [DISTWIDTH] IN BLOOD BY AUTOMATED COUNT: 15.2 % (ref 11–14.5)
EST. AVERAGE GLUCOSE BLD GHB EST-MCNC: 139.9 MG/DL (ref 70–115)
GFR SERPLBLD CREATININE-BSD FMLA CKD-EPI: 23 ML/MIN/1.73/M2
GLOBULIN SER-MCNC: 2.8 GM/DL (ref 2–3.9)
GLUCOSE SERPL-MCNC: 127 MG/DL (ref 70–115)
HBA1C MFR BLD: 6.5 % (ref 4–6)
HCT VFR BLD AUTO: 27.2 % (ref 36–48)
HGB BLD-MCNC: 8.3 G/DL (ref 11.8–16)
IMM GRANULOCYTES # BLD AUTO: 0.05 X10(3)/MCL (ref 0–0.03)
IMM GRANULOCYTES NFR BLD AUTO: 0.6 % (ref 0–0.5)
IRON SATN MFR SERPL: 6 % (ref 20–50)
IRON SERPL-MCNC: 21 UG/DL (ref 50–170)
LYMPHOCYTES # BLD AUTO: 1.5 X10(3)/MCL (ref 1.16–3.74)
LYMPHOCYTES NFR BLD AUTO: 16.7 % (ref 20–55)
MCH RBC QN AUTO: 23.9 PG (ref 27–34)
MCHC RBC AUTO-ENTMCNC: 30.5 G/DL (ref 31–37)
MCV RBC AUTO: 78.4 FL (ref 79–99)
MICROALBUMIN UR-MCNC: 340.9 UG/ML
MICROALBUMIN/CREAT RATIO PNL UR: 247.6 MG/GM CR (ref 0–30)
MONOCYTES # BLD AUTO: 0.71 X10(3)/MCL (ref 0.24–0.36)
MONOCYTES NFR BLD AUTO: 7.9 % (ref 4.7–12.5)
NEUTROPHILS # BLD AUTO: 6.4 X10(3)/MCL (ref 1.56–6.13)
NEUTROPHILS NFR BLD AUTO: 71.2 % (ref 37–73)
NRBC BLD AUTO-RTO: 0 %
PLATELET # BLD AUTO: 288 X10(3)/MCL (ref 140–371)
PMV BLD AUTO: 9.8 FL (ref 9.4–12.4)
POTASSIUM SERPL-SCNC: 4.2 MMOL/L (ref 3.5–5.1)
PROT SERPL-MCNC: 7.3 GM/DL (ref 6.3–8.2)
RBC # BLD AUTO: 3.47 X10(6)/MCL (ref 4–5.1)
SODIUM SERPL-SCNC: 135 MMOL/L (ref 136–145)
TIBC SERPL-MCNC: 322 UG/DL (ref 70–310)
TIBC SERPL-MCNC: 343 UG/DL (ref 250–450)
TRANSFERRIN SERPL-MCNC: 320 MG/DL
WBC # BLD AUTO: 8.98 X10(3)/MCL (ref 4–11.5)

## 2025-02-03 PROCEDURE — 36415 COLL VENOUS BLD VENIPUNCTURE: CPT

## 2025-02-03 PROCEDURE — 63600175 PHARM REV CODE 636 W HCPCS: Mod: JZ,EC

## 2025-02-03 PROCEDURE — 83036 HEMOGLOBIN GLYCOSYLATED A1C: CPT

## 2025-02-03 PROCEDURE — 96372 THER/PROPH/DIAG INJ SC/IM: CPT

## 2025-02-03 PROCEDURE — 82570 ASSAY OF URINE CREATININE: CPT

## 2025-02-03 PROCEDURE — 80053 COMPREHEN METABOLIC PANEL: CPT

## 2025-02-03 PROCEDURE — 83540 ASSAY OF IRON: CPT

## 2025-02-03 PROCEDURE — 85025 COMPLETE CBC W/AUTO DIFF WBC: CPT

## 2025-02-03 RX ADMIN — EPOETIN ALFA-EPBX 20000 UNITS: 10000 INJECTION, SOLUTION INTRAVENOUS; SUBCUTANEOUS at 10:02

## 2025-02-11 ENCOUNTER — OFFICE VISIT (OUTPATIENT)
Dept: FAMILY MEDICINE | Facility: CLINIC | Age: 89
End: 2025-02-11
Payer: MEDICARE

## 2025-02-11 VITALS
TEMPERATURE: 98 F | OXYGEN SATURATION: 97 % | WEIGHT: 122.63 LBS | HEIGHT: 62 IN | SYSTOLIC BLOOD PRESSURE: 130 MMHG | HEART RATE: 67 BPM | BODY MASS INDEX: 22.56 KG/M2 | DIASTOLIC BLOOD PRESSURE: 80 MMHG

## 2025-02-11 DIAGNOSIS — I34.2 NONRHEUMATIC MITRAL VALVE STENOSIS: ICD-10-CM

## 2025-02-11 DIAGNOSIS — D50.0 IRON DEFICIENCY ANEMIA DUE TO CHRONIC BLOOD LOSS: ICD-10-CM

## 2025-02-11 DIAGNOSIS — I35.0 SEVERE AORTIC VALVE STENOSIS: ICD-10-CM

## 2025-02-11 DIAGNOSIS — D63.1 ANEMIA DUE TO STAGE 4 CHRONIC KIDNEY DISEASE TREATED WITH ERYTHROPOIETIN: ICD-10-CM

## 2025-02-11 DIAGNOSIS — F33.41 RECURRENT MAJOR DEPRESSIVE DISORDER, IN PARTIAL REMISSION: ICD-10-CM

## 2025-02-11 DIAGNOSIS — E11.59 TYPE 2 DIABETES MELLITUS WITH OTHER CIRCULATORY COMPLICATIONS: Primary | ICD-10-CM

## 2025-02-11 DIAGNOSIS — N18.4 ANEMIA DUE TO STAGE 4 CHRONIC KIDNEY DISEASE TREATED WITH ERYTHROPOIETIN: ICD-10-CM

## 2025-02-11 DIAGNOSIS — E78.00 PURE HYPERCHOLESTEROLEMIA: ICD-10-CM

## 2025-02-11 DIAGNOSIS — R54 ADVANCED AGE: ICD-10-CM

## 2025-02-11 DIAGNOSIS — N18.4 STAGE 4 CHRONIC KIDNEY DISEASE: ICD-10-CM

## 2025-02-11 DIAGNOSIS — I73.9 PERIPHERAL VASCULAR DISEASE: ICD-10-CM

## 2025-02-11 DIAGNOSIS — I50.32 CHRONIC DIASTOLIC (CONGESTIVE) HEART FAILURE: ICD-10-CM

## 2025-02-11 PROCEDURE — 99215 OFFICE O/P EST HI 40 MIN: CPT | Mod: ,,, | Performed by: FAMILY MEDICINE

## 2025-02-11 RX ORDER — RANOLAZINE 500 MG/1
500 TABLET, EXTENDED RELEASE ORAL DAILY
COMMUNITY
Start: 2025-01-15

## 2025-02-11 RX ORDER — SODIUM CHLORIDE 0.9 % (FLUSH) 0.9 %
10 SYRINGE (ML) INJECTION
OUTPATIENT
Start: 2025-02-11

## 2025-02-11 RX ORDER — HEPARIN 100 UNIT/ML
500 SYRINGE INTRAVENOUS
OUTPATIENT
Start: 2025-02-11

## 2025-02-11 RX ORDER — ISOSORBIDE MONONITRATE 60 MG/1
60 TABLET, EXTENDED RELEASE ORAL DAILY
COMMUNITY

## 2025-02-11 RX ORDER — SODIUM FERRIC GLUCONATE COMPLEX IN SUCROSE 12.5 MG/ML
250 INJECTION INTRAVENOUS
OUTPATIENT
Start: 2025-02-11

## 2025-02-11 RX ORDER — METOPROLOL SUCCINATE 25 MG/1
25 TABLET, EXTENDED RELEASE ORAL
COMMUNITY
Start: 2025-02-06

## 2025-02-11 RX ORDER — DIPHENHYDRAMINE HYDROCHLORIDE 50 MG/ML
50 INJECTION INTRAMUSCULAR; INTRAVENOUS ONCE AS NEEDED
OUTPATIENT
Start: 2025-02-11

## 2025-02-11 RX ORDER — EPINEPHRINE 0.3 MG/.3ML
0.3 INJECTION SUBCUTANEOUS ONCE AS NEEDED
OUTPATIENT
Start: 2025-02-11

## 2025-02-11 NOTE — ASSESSMENT & PLAN NOTE
She is in need of intravenous iron infusion as her iron level is low and ferritin is low normal    We will arrange ferrous gluconate intravenously 250 mg weekly for 4 weeks

## 2025-02-11 NOTE — ASSESSMENT & PLAN NOTE
Had a lengthy conversation with the patient and her daughters who were present with her today.  We discussed her overall medical diagnoses and prognosis.    I introduced the idea of hospice.  Currently, I would not be surprised if her life expectancy was less than 6 months.  She is on maximal medical therapy for all of her underlying chronic conditions.  If we continue to fail to produce symptom improvement with our current medication adjustments, we will consider hospice referral.

## 2025-02-11 NOTE — PROGRESS NOTES
SUBJECTIVE:  DANTE Adames is a 89 y.o. female here for Follow-up (3 MONTH LAB), Shortness of Breath (SHALLOW BREATHING), and Fatigue.   History of Present Illness    CHIEF COMPLAINT:  Follow up on chronic health conditions and recent labs    NEUROLOGICAL SYMPTOMS:  She reports crawling sensation affecting legs and arms, and a chronic, longstanding vibrating sensation throughout her body.    CARDIOVASCULAR:  She has severe aortic and mitral valve stenosis with coronary artery disease. Due to her complex medical condition, surgical intervention for the heart issues is deemed high-risk.  Her cardiologist recently made some medication adjustments, as follows.  She takes Isosorbide 60mg in morning and 30mg in evening, Ranexa in evening, Metoprolol succinate 25mg daily in evening, and Lasix 20mg 3 times weekly and 10mg 4 times weekly.   She reports dizziness as a side effect from Ranexa.    RENAL:  She has stage 4 chronic kidney disease with approximately 20% risk of complete kidney failure within the next five years.    HEMATOLOGIC:  Her hemoglobin is 8.3. Last iron infusion was received in March/April of previous year. She recently received Procrit/Reticrit administration.    DIET:  She reports recent weight gain. Her eating patterns vary based on social situations - she tends to eat less when dining alone but consumes larger portions in social settings or at restaurants, often finishing entire meals.    She does report that she has not been taking her Jardiance for the last few weeks.  She and her daughter were concerned about possible side effects while no one was at home with her.  However, her daughter is back at home with her now.         Leatha's allergies, medications, history, and problem list were updated as appropriate.    ROS:  Pertinent ROS as above, otherwise negative    OBJECTIVE:  Vital signs  Visit Vitals  /80 (BP Location: Left arm, Patient Position: Sitting)   Pulse 67   Temp 98.3 °F (36.8  "°C) (Temporal)   Ht 5' 2.01" (1.575 m)   Wt 55.6 kg (122 lb 9.6 oz)   SpO2 97%   BMI 22.42 kg/m²          PHYSICAL EXAM:  General:  Awake, alert, no acute distress, thin, weight up 4 lb from one-week ago   Eyes:  Pupils equal, round, reactive to light.  Conjunctiva normal bilaterally.  Cardiovascular: Regular rate and rhythm.  3/6 systolic murmur  Respiratory: Clear to auscultation bilaterally, normal effort  Extremities:  No peripheral edema, no cyanosis  Skin: No rashes    Chemistry:  Lab Results   Component Value Date     (L) 02/03/2025    K 4.2 02/03/2025    BUN 34 (H) 02/03/2025    CREATININE 2.05 (H) 02/03/2025    EGFRNORACEVR 23 02/03/2025    GLUCOSE 127 (H) 02/03/2025    CALCIUM 9.8 02/03/2025    ALKPHOS 45 (L) 02/03/2025    AST 27 02/03/2025    ALT 14 02/03/2025    MG 1.40 (L) 01/02/2025    TSH 3.910 (H) 06/24/2024    [id number] 0.89 06/24/2024        Lab Results   Component Value Date    HGBA1C 6.5 (H) 02/03/2025        Hematology:  Lab Results   Component Value Date    WBC 8.98 02/03/2025    HGB 8.3 (L) 02/03/2025    MCV 78.4 (L) 02/03/2025     02/03/2025       Lipid Panel:  Lab Results   Component Value Date    CHOL 150 11/01/2024    HDL 67 (H) 11/01/2024    LDLDIRECT 64.8 11/01/2024    TRIG 125 11/01/2024        ASSESSMENT/PLAN:  1. Type 2 diabetes mellitus with other circulatory complications  Overview:  Lab Results   Component Value Date    HGBA1C 6.5 (H) 02/03/2025         Assessment & Plan:  Jardiance 10 mg daily      2. Anemia due to stage 4 chronic kidney disease treated with erythropoietin  Overview:  Lab Results   Component Value Date    WBC 8.98 02/03/2025    HGB 8.3 (L) 02/03/2025    HCT 27.2 (L) 02/03/2025    MCV 78.4 (L) 02/03/2025     02/03/2025           Assessment & Plan:  On erythropoietin            3. 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 loss  Overview:  Lab Results   Component Value Date    WBC 8.98 02/03/2025    HGB 8.3 (L) 02/03/2025    HCT 27.2 (L) " 02/03/2025    MCV 78.4 (L) 02/03/2025     02/03/2025       Lab Results   Component Value Date    UIBC 322 (H) 02/03/2025    IRON 21 (L) 02/03/2025    TRANSFERRIN 320 02/03/2025    TIBC 343 02/03/2025    LABIRON 6 (L) 02/03/2025      Lab Results   Component Value Date    FERRITIN 11.6 01/02/2025           Assessment & Plan:  She is in need of intravenous iron infusion as her iron level is low and ferritin is low normal    We will arrange ferrous gluconate intravenously 250 mg weekly for 4 weeks      4. Stage 4 chronic kidney disease  Overview:  Kidney Failure Risk Equation (Tangri)    Kidney Failure Risk at 2 years: 7.3%    Kidney Failure Risk at 5 years: 21.2%    Lab Results   Component Value Date    MICALBCREAT 247.6 (H) 02/03/2025    CREATININE 2.05 (H) 02/03/2025         Assessment & Plan:  Stable    Avoid NSAIDs    Followed by Nephrology      5. Chronic diastolic (congestive) heart failure  Assessment & Plan:  Lasix, Imdur, Jardiance, Ranexa      6. Pure hypercholesterolemia  Overview:  Lab Results   Component Value Date    CHOL 150 11/01/2024    HDL 67 (H) 11/01/2024    LDLDIRECT 64.8 11/01/2024    TRIG 125 11/01/2024          Assessment & Plan:  On atorvastatin 40 mg daily with LDL at goal of less than 70       7. Recurrent major depressive disorder, in partial remission  Assessment & Plan:  Venlafaxine extended release 150 mg daily      8. Peripheral vascular disease  Overview:  September 2018: Stent and angioplasty to right leg-SFA and tibiofemoral disease; 75% left superficial    Assessment & Plan:  On aspirin, statin, Plavix      9. Severe aortic valve stenosis  Overview:  October 24, 2024 echocardiogram:  EF 55%   Aortic valve area 0.5 sq cm  Moderate to severe tricuspid regurgitation  Right ventricular systolic pressure 72 mmHg  Severe mitral stenosis and moderate mitral regurgitation    Assessment & Plan:  She is on maximum medical therapy and is not a surgical candidate          10. Nonrheumatic  mitral valve stenosis  Overview:  November 2, 2023 echocardiogram, moderate mitral stenosis with moderate posteriorly directed mitral valve regurgitation    October 24, 2024 echocardiogram:  EF 55%   Aortic valve area 0.5 sq cm  Moderate to severe tricuspid regurgitation  Right ventricular systolic pressure 72 mmHg  Severe mitral stenosis and moderate mitral regurgitation      Assessment & Plan:  On maximal medical therapy and is not a surgical candidate      11. Advanced age  Assessment & Plan:  Had a lengthy conversation with the patient and her daughters who were present with her today.  We discussed her overall medical diagnoses and prognosis.    I introduced the idea of hospice.  Currently, I would not be surprised if her life expectancy was less than 6 months.  She is on maximal medical therapy for all of her underlying chronic conditions.  If we continue to fail to produce symptom improvement with our current medication adjustments, we will consider hospice referral.      Other orders  -     ferric gluconate (Ferrlecit) 250 mg in 0.9% NaCl 270 mL IVPB  -     EPINEPHrine (EPIPEN) 0.3 mg/0.3 mL pen injection 0.3 mg  -     diphenhydrAMINE injection 50 mg  -     hydrocortisone sodium succinate injection 100 mg  -     0.9% NaCl 250 mL flush bag  -     sodium chloride 0.9% flush 10 mL  -     heparin, porcine (PF) 100 unit/mL injection flush 500 Units  -     alteplase injection 2 mg            Follow Up:  Follow up in about 4 weeks (around 3/11/2025) for chronic health conditions.      This note was generated with the assistance of ambient listening technology. Verbal consent was obtained by the patient and accompanying visitor(s) for the recording of patient appointment to facilitate this note. I attest to having reviewed and edited the generated note for accuracy, though some syntax or spelling errors may persist. Please contact the author of this note for any clarification.

## 2025-02-18 ENCOUNTER — INFUSION (OUTPATIENT)
Facility: HOSPITAL | Age: 89
End: 2025-02-18
Attending: FAMILY MEDICINE
Payer: MEDICARE

## 2025-02-18 VITALS — BODY MASS INDEX: 22.08 KG/M2 | HEIGHT: 62 IN | WEIGHT: 120 LBS

## 2025-02-18 DIAGNOSIS — D63.1 ANEMIA DUE TO STAGE 4 CHRONIC KIDNEY DISEASE TREATED WITH ERYTHROPOIETIN: Primary | ICD-10-CM

## 2025-02-18 DIAGNOSIS — D50.0 IRON DEFICIENCY ANEMIA DUE TO CHRONIC BLOOD LOSS: ICD-10-CM

## 2025-02-18 DIAGNOSIS — N18.4 ANEMIA DUE TO STAGE 4 CHRONIC KIDNEY DISEASE TREATED WITH ERYTHROPOIETIN: Primary | ICD-10-CM

## 2025-02-18 PROCEDURE — 25000003 PHARM REV CODE 250: Performed by: FAMILY MEDICINE

## 2025-02-18 PROCEDURE — 96365 THER/PROPH/DIAG IV INF INIT: CPT

## 2025-02-18 PROCEDURE — 63600175 PHARM REV CODE 636 W HCPCS: Performed by: FAMILY MEDICINE

## 2025-02-18 RX ORDER — HEPARIN 100 UNIT/ML
500 SYRINGE INTRAVENOUS
Status: DISCONTINUED | OUTPATIENT
Start: 2025-02-18 | End: 2025-02-18 | Stop reason: HOSPADM

## 2025-02-18 RX ORDER — SODIUM CHLORIDE 0.9 % (FLUSH) 0.9 %
10 SYRINGE (ML) INJECTION
OUTPATIENT
Start: 2025-02-25

## 2025-02-18 RX ORDER — SODIUM CHLORIDE 0.9 % (FLUSH) 0.9 %
10 SYRINGE (ML) INJECTION
Status: DISCONTINUED | OUTPATIENT
Start: 2025-02-18 | End: 2025-02-18 | Stop reason: HOSPADM

## 2025-02-18 RX ORDER — DIPHENHYDRAMINE HYDROCHLORIDE 50 MG/ML
50 INJECTION INTRAMUSCULAR; INTRAVENOUS ONCE AS NEEDED
OUTPATIENT
Start: 2025-02-25

## 2025-02-18 RX ORDER — SODIUM FERRIC GLUCONATE COMPLEX IN SUCROSE 12.5 MG/ML
250 INJECTION INTRAVENOUS
Status: COMPLETED | OUTPATIENT
Start: 2025-02-18 | End: 2025-02-18

## 2025-02-18 RX ORDER — SODIUM FERRIC GLUCONATE COMPLEX IN SUCROSE 12.5 MG/ML
250 INJECTION INTRAVENOUS
OUTPATIENT
Start: 2025-02-25

## 2025-02-18 RX ORDER — EPINEPHRINE 0.3 MG/.3ML
0.3 INJECTION SUBCUTANEOUS ONCE AS NEEDED
OUTPATIENT
Start: 2025-02-25

## 2025-02-18 RX ORDER — HEPARIN 100 UNIT/ML
500 SYRINGE INTRAVENOUS
OUTPATIENT
Start: 2025-02-25

## 2025-02-18 RX ADMIN — SODIUM CHLORIDE 250 MG: 9 INJECTION, SOLUTION INTRAVENOUS at 10:02

## 2025-02-18 NOTE — NURSING
Ferrlecit IV administered, pt tolerated well. Discharged home in stable condition. Next appt given.

## 2025-02-24 ENCOUNTER — TELEPHONE (OUTPATIENT)
Dept: FAMILY MEDICINE | Facility: CLINIC | Age: 89
End: 2025-02-24
Payer: MEDICARE

## 2025-02-24 NOTE — TELEPHONE ENCOUNTER
Daughter states that pts bp has been really low since starting the Jardiance. She would like a call back. Please advise.

## 2025-02-24 NOTE — TELEPHONE ENCOUNTER
Lexie the pt daughter states that the pt BP has been running low, systolic staying around 113-125 diastolic 39-50, she said that you restarted the jardiance and told them to report it to you, her daughter is also asking if she is to continue taking the magnesium.

## 2025-02-25 ENCOUNTER — INFUSION (OUTPATIENT)
Facility: HOSPITAL | Age: 89
End: 2025-02-25
Attending: FAMILY MEDICINE
Payer: MEDICARE

## 2025-02-25 VITALS
HEART RATE: 65 BPM | RESPIRATION RATE: 18 BRPM | TEMPERATURE: 98 F | DIASTOLIC BLOOD PRESSURE: 51 MMHG | OXYGEN SATURATION: 99 % | SYSTOLIC BLOOD PRESSURE: 127 MMHG

## 2025-02-25 DIAGNOSIS — N18.4 ANEMIA DUE TO STAGE 4 CHRONIC KIDNEY DISEASE TREATED WITH ERYTHROPOIETIN: Primary | ICD-10-CM

## 2025-02-25 DIAGNOSIS — D63.1 ANEMIA DUE TO STAGE 4 CHRONIC KIDNEY DISEASE TREATED WITH ERYTHROPOIETIN: Primary | ICD-10-CM

## 2025-02-25 DIAGNOSIS — D50.0 IRON DEFICIENCY ANEMIA DUE TO CHRONIC BLOOD LOSS: ICD-10-CM

## 2025-02-25 PROCEDURE — 63600175 PHARM REV CODE 636 W HCPCS: Performed by: FAMILY MEDICINE

## 2025-02-25 PROCEDURE — 25000003 PHARM REV CODE 250: Performed by: FAMILY MEDICINE

## 2025-02-25 PROCEDURE — 96365 THER/PROPH/DIAG IV INF INIT: CPT

## 2025-02-25 RX ORDER — HEPARIN 100 UNIT/ML
500 SYRINGE INTRAVENOUS
Status: DISCONTINUED | OUTPATIENT
Start: 2025-02-25 | End: 2025-02-25 | Stop reason: HOSPADM

## 2025-02-25 RX ORDER — EPINEPHRINE 0.3 MG/.3ML
0.3 INJECTION SUBCUTANEOUS ONCE AS NEEDED
OUTPATIENT
Start: 2025-03-04

## 2025-02-25 RX ORDER — SODIUM FERRIC GLUCONATE COMPLEX IN SUCROSE 12.5 MG/ML
250 INJECTION INTRAVENOUS
OUTPATIENT
Start: 2025-03-04

## 2025-02-25 RX ORDER — SODIUM CHLORIDE 0.9 % (FLUSH) 0.9 %
10 SYRINGE (ML) INJECTION
Status: DISCONTINUED | OUTPATIENT
Start: 2025-02-25 | End: 2025-02-25 | Stop reason: HOSPADM

## 2025-02-25 RX ORDER — DIPHENHYDRAMINE HYDROCHLORIDE 50 MG/ML
50 INJECTION INTRAMUSCULAR; INTRAVENOUS ONCE AS NEEDED
OUTPATIENT
Start: 2025-03-04

## 2025-02-25 RX ORDER — SODIUM CHLORIDE 0.9 % (FLUSH) 0.9 %
10 SYRINGE (ML) INJECTION
OUTPATIENT
Start: 2025-03-04

## 2025-02-25 RX ORDER — SODIUM FERRIC GLUCONATE COMPLEX IN SUCROSE 12.5 MG/ML
250 INJECTION INTRAVENOUS
Status: COMPLETED | OUTPATIENT
Start: 2025-02-25 | End: 2025-02-25

## 2025-02-25 RX ORDER — HEPARIN 100 UNIT/ML
500 SYRINGE INTRAVENOUS
OUTPATIENT
Start: 2025-03-04

## 2025-02-25 RX ADMIN — SODIUM CHLORIDE 250 MG: 9 INJECTION, SOLUTION INTRAVENOUS at 10:02

## 2025-03-03 ENCOUNTER — LAB VISIT (OUTPATIENT)
Dept: LAB | Facility: HOSPITAL | Age: 89
End: 2025-03-03
Attending: FAMILY MEDICINE
Payer: MEDICARE

## 2025-03-03 ENCOUNTER — INFUSION (OUTPATIENT)
Facility: HOSPITAL | Age: 89
End: 2025-03-03
Attending: INTERNAL MEDICINE
Payer: MEDICARE

## 2025-03-03 ENCOUNTER — RESULTS FOLLOW-UP (OUTPATIENT)
Dept: FAMILY MEDICINE | Facility: CLINIC | Age: 89
End: 2025-03-03

## 2025-03-03 VITALS
OXYGEN SATURATION: 99 % | TEMPERATURE: 98 F | DIASTOLIC BLOOD PRESSURE: 57 MMHG | SYSTOLIC BLOOD PRESSURE: 129 MMHG | HEART RATE: 63 BPM | RESPIRATION RATE: 18 BRPM

## 2025-03-03 DIAGNOSIS — D63.1 ANEMIA DUE TO STAGE 4 CHRONIC KIDNEY DISEASE TREATED WITH ERYTHROPOIETIN: Primary | ICD-10-CM

## 2025-03-03 DIAGNOSIS — N18.4 ANEMIA DUE TO STAGE 4 CHRONIC KIDNEY DISEASE TREATED WITH ERYTHROPOIETIN: Primary | ICD-10-CM

## 2025-03-03 DIAGNOSIS — N18.4 ANEMIA DUE TO STAGE 4 CHRONIC KIDNEY DISEASE TREATED WITH ERYTHROPOIETIN: ICD-10-CM

## 2025-03-03 DIAGNOSIS — D63.1 ANEMIA DUE TO STAGE 4 CHRONIC KIDNEY DISEASE TREATED WITH ERYTHROPOIETIN: ICD-10-CM

## 2025-03-03 LAB
HCT VFR BLD AUTO: 24.7 % (ref 36–48)
HGB BLD-MCNC: 7.6 G/DL (ref 11.8–16)
IRON SATN MFR SERPL: 21 % (ref 20–50)
IRON SERPL-MCNC: 62 UG/DL (ref 50–170)
TIBC SERPL-MCNC: 233 UG/DL (ref 70–310)
TIBC SERPL-MCNC: 295 UG/DL (ref 250–450)
TRANSFERRIN SERPL-MCNC: 268 MG/DL

## 2025-03-03 PROCEDURE — 96372 THER/PROPH/DIAG INJ SC/IM: CPT

## 2025-03-03 PROCEDURE — 85018 HEMOGLOBIN: CPT

## 2025-03-03 PROCEDURE — 63600175 PHARM REV CODE 636 W HCPCS: Mod: EC

## 2025-03-03 PROCEDURE — 83550 IRON BINDING TEST: CPT

## 2025-03-03 PROCEDURE — 36415 COLL VENOUS BLD VENIPUNCTURE: CPT

## 2025-03-03 RX ADMIN — EPOETIN ALFA 20000 UNITS: 20000 SOLUTION INTRAVENOUS; SUBCUTANEOUS at 11:03

## 2025-03-03 NOTE — NURSING
Patients labs resulted, Hemoglobin noted to drop from last month. Called Dr. Denise's office, spoke with nurse. States to tell patient if her symptoms worsen to call office. Pt reports she feels better this week, daughter agrees to call if needed.   Retacrit injection administered, pt tolerated well. Discharged home in stable condition, will return on Wednesday for Iron infusion.

## 2025-03-05 ENCOUNTER — INFUSION (OUTPATIENT)
Facility: HOSPITAL | Age: 89
End: 2025-03-05
Attending: FAMILY MEDICINE
Payer: MEDICARE

## 2025-03-05 ENCOUNTER — TELEPHONE (OUTPATIENT)
Dept: FAMILY MEDICINE | Facility: CLINIC | Age: 89
End: 2025-03-05
Payer: MEDICARE

## 2025-03-05 VITALS
DIASTOLIC BLOOD PRESSURE: 63 MMHG | SYSTOLIC BLOOD PRESSURE: 105 MMHG | RESPIRATION RATE: 18 BRPM | TEMPERATURE: 98 F | OXYGEN SATURATION: 100 % | HEART RATE: 77 BPM

## 2025-03-05 DIAGNOSIS — D63.1 ANEMIA DUE TO STAGE 4 CHRONIC KIDNEY DISEASE TREATED WITH ERYTHROPOIETIN: Primary | ICD-10-CM

## 2025-03-05 DIAGNOSIS — D50.0 IRON DEFICIENCY ANEMIA DUE TO CHRONIC BLOOD LOSS: ICD-10-CM

## 2025-03-05 DIAGNOSIS — N18.4 ANEMIA DUE TO STAGE 4 CHRONIC KIDNEY DISEASE TREATED WITH ERYTHROPOIETIN: Primary | ICD-10-CM

## 2025-03-05 LAB
HCT VFR BLD AUTO: 24.2 % (ref 36–48)
HGB BLD-MCNC: 7.5 G/DL (ref 11.8–16)

## 2025-03-05 PROCEDURE — 96365 THER/PROPH/DIAG IV INF INIT: CPT

## 2025-03-05 PROCEDURE — 25000003 PHARM REV CODE 250: Performed by: FAMILY MEDICINE

## 2025-03-05 PROCEDURE — 63600175 PHARM REV CODE 636 W HCPCS: Performed by: FAMILY MEDICINE

## 2025-03-05 PROCEDURE — 85018 HEMOGLOBIN: CPT

## 2025-03-05 PROCEDURE — 36415 COLL VENOUS BLD VENIPUNCTURE: CPT

## 2025-03-05 RX ORDER — SODIUM FERRIC GLUCONATE COMPLEX IN SUCROSE 12.5 MG/ML
250 INJECTION INTRAVENOUS
Status: COMPLETED | OUTPATIENT
Start: 2025-03-05 | End: 2025-03-05

## 2025-03-05 RX ORDER — HEPARIN 100 UNIT/ML
500 SYRINGE INTRAVENOUS
OUTPATIENT
Start: 2025-03-11

## 2025-03-05 RX ORDER — DIPHENHYDRAMINE HYDROCHLORIDE 50 MG/ML
50 INJECTION INTRAMUSCULAR; INTRAVENOUS ONCE AS NEEDED
OUTPATIENT
Start: 2025-03-11

## 2025-03-05 RX ORDER — SODIUM CHLORIDE 0.9 % (FLUSH) 0.9 %
10 SYRINGE (ML) INJECTION
Status: DISCONTINUED | OUTPATIENT
Start: 2025-03-05 | End: 2025-03-05 | Stop reason: HOSPADM

## 2025-03-05 RX ORDER — SODIUM CHLORIDE 0.9 % (FLUSH) 0.9 %
10 SYRINGE (ML) INJECTION
OUTPATIENT
Start: 2025-03-11

## 2025-03-05 RX ORDER — EPINEPHRINE 0.3 MG/.3ML
0.3 INJECTION SUBCUTANEOUS ONCE AS NEEDED
OUTPATIENT
Start: 2025-03-11

## 2025-03-05 RX ORDER — SODIUM FERRIC GLUCONATE COMPLEX IN SUCROSE 12.5 MG/ML
250 INJECTION INTRAVENOUS
OUTPATIENT
Start: 2025-03-11

## 2025-03-05 RX ORDER — HEPARIN 100 UNIT/ML
500 SYRINGE INTRAVENOUS
Status: DISCONTINUED | OUTPATIENT
Start: 2025-03-05 | End: 2025-03-05 | Stop reason: HOSPADM

## 2025-03-05 RX ADMIN — SODIUM CHLORIDE 250 MG: 9 INJECTION, SOLUTION INTRAVENOUS at 10:03

## 2025-03-05 RX ADMIN — SODIUM CHLORIDE: 9 INJECTION, SOLUTION INTRAVENOUS at 10:03

## 2025-03-05 NOTE — TELEPHONE ENCOUNTER
Spoke to pts daughter Lexie-states she is due for an iron infusion on Tuesday and needs to have labs done and needs an order.H & H orders entered.

## 2025-03-11 ENCOUNTER — TELEPHONE (OUTPATIENT)
Dept: FAMILY MEDICINE | Facility: CLINIC | Age: 89
End: 2025-03-11
Payer: MEDICARE

## 2025-03-11 ENCOUNTER — INFUSION (OUTPATIENT)
Facility: HOSPITAL | Age: 89
End: 2025-03-11
Attending: FAMILY MEDICINE
Payer: MEDICARE

## 2025-03-11 VITALS
DIASTOLIC BLOOD PRESSURE: 68 MMHG | HEART RATE: 71 BPM | OXYGEN SATURATION: 98 % | SYSTOLIC BLOOD PRESSURE: 152 MMHG | TEMPERATURE: 98 F | RESPIRATION RATE: 18 BRPM

## 2025-03-11 DIAGNOSIS — D63.1 ANEMIA DUE TO STAGE 4 CHRONIC KIDNEY DISEASE TREATED WITH ERYTHROPOIETIN: Primary | ICD-10-CM

## 2025-03-11 DIAGNOSIS — N18.4 ANEMIA DUE TO STAGE 4 CHRONIC KIDNEY DISEASE TREATED WITH ERYTHROPOIETIN: Primary | ICD-10-CM

## 2025-03-11 DIAGNOSIS — D50.0 IRON DEFICIENCY ANEMIA DUE TO CHRONIC BLOOD LOSS: ICD-10-CM

## 2025-03-11 LAB
HCT VFR BLD AUTO: 30.1 % (ref 36–48)
HGB BLD-MCNC: 9.3 G/DL (ref 11.8–16)

## 2025-03-11 PROCEDURE — 85018 HEMOGLOBIN: CPT | Performed by: FAMILY MEDICINE

## 2025-03-11 PROCEDURE — 63600175 PHARM REV CODE 636 W HCPCS: Performed by: FAMILY MEDICINE

## 2025-03-11 PROCEDURE — 25000003 PHARM REV CODE 250: Performed by: FAMILY MEDICINE

## 2025-03-11 PROCEDURE — 36415 COLL VENOUS BLD VENIPUNCTURE: CPT

## 2025-03-11 PROCEDURE — 96365 THER/PROPH/DIAG IV INF INIT: CPT

## 2025-03-11 RX ORDER — EPINEPHRINE 0.3 MG/.3ML
0.3 INJECTION SUBCUTANEOUS ONCE AS NEEDED
OUTPATIENT
Start: 2025-03-11

## 2025-03-11 RX ORDER — SODIUM CHLORIDE 0.9 % (FLUSH) 0.9 %
10 SYRINGE (ML) INJECTION
Status: DISCONTINUED | OUTPATIENT
Start: 2025-03-11 | End: 2025-03-11 | Stop reason: HOSPADM

## 2025-03-11 RX ORDER — SODIUM CHLORIDE 0.9 % (FLUSH) 0.9 %
10 SYRINGE (ML) INJECTION
OUTPATIENT
Start: 2025-03-11

## 2025-03-11 RX ORDER — HEPARIN 100 UNIT/ML
500 SYRINGE INTRAVENOUS
Status: DISCONTINUED | OUTPATIENT
Start: 2025-03-11 | End: 2025-03-11 | Stop reason: HOSPADM

## 2025-03-11 RX ORDER — SODIUM FERRIC GLUCONATE COMPLEX IN SUCROSE 12.5 MG/ML
250 INJECTION INTRAVENOUS
Status: CANCELLED | OUTPATIENT
Start: 2025-03-11

## 2025-03-11 RX ORDER — SODIUM FERRIC GLUCONATE COMPLEX IN SUCROSE 12.5 MG/ML
250 INJECTION INTRAVENOUS
Status: COMPLETED | OUTPATIENT
Start: 2025-03-11 | End: 2025-03-11

## 2025-03-11 RX ORDER — HEPARIN 100 UNIT/ML
500 SYRINGE INTRAVENOUS
OUTPATIENT
Start: 2025-03-11

## 2025-03-11 RX ORDER — DIPHENHYDRAMINE HYDROCHLORIDE 50 MG/ML
50 INJECTION, SOLUTION INTRAMUSCULAR; INTRAVENOUS ONCE AS NEEDED
OUTPATIENT
Start: 2025-03-11

## 2025-03-11 RX ADMIN — SODIUM CHLORIDE 250 MG: 9 INJECTION, SOLUTION INTRAVENOUS at 10:03

## 2025-03-11 NOTE — TELEPHONE ENCOUNTER
----- Message from Pharmacist Cinthia sent at 3/11/2025 10:38 AM CDT -----  Regarding: RE: Mun[place]ary  She received the 250mg dose this morning prior to receiving this message. Not future doses needed.Cinthia Ortiz  ----- Message -----  From: Leora Wiggins LPN  Sent: 3/11/2025  10:26 AM CDT  To: Cinthia Ramirez PharmD  Subject: FW: Ferrlecit                                      ----- Message -----  From: Dennys Linares MD  Sent: 3/11/2025  10:03 AM CDT  To: Leora Wiggins LPN  Subject: RE: Ferrlecit                                    Ok to reduce dose to 125 mg  ----- Message -----  From: Leora Wiggins LPN  Sent: 3/10/2025   4:48 PM CDT  To: Dennys Linares MD  Subject: FW: Ferrlecit                                      ----- Message -----  From: Cinthia Ramirez PharmD  Sent: 3/10/2025   3:59 PM CDT  To: Jeannie Metzger, Unruly; Vijay Calix  Subject: Ferrlecit                                        Good afternoon, We have observed an increase in infusion reaction to Ferrlecit 250mg IVPB including subsequent doses after tolerating previous doses.  This patient did not have an infusion reaction to dose 1 through 3. The next appointment is on 3/11/25. Would you like to change to Ferrlecit 125mg as an IV push or IVPB, or continue with the 250mg dose?  With a change to 125mg dose, the number of doses will increase from 1 more infusions to 2 more infusions.  The pharmacy team is happy to make this therapy plan adjustment for you.  Below is communication that you can use to send to your patient if you choose to make this change. Additionally, the patient will need additional appointments scheduled. Thank you,  [facility] Clinical Pharmacy Manager     Dear (Patient Name) We would like to inform you about an important update regarding your Ferrlecit (sodium ferric gluconate complex) treatments. Due to an observed increase in infusion reactions related to the Ferrlecit 250 mg  IVPB dose, we have adjusted your outpatient treatment approach to ensure your safety. Here's what this means for you:  The treatment regimen has been changed to Ferrlecit 125 mg weekly up to 8 doses. Please feel free to discuss any questions or concerns with your healthcare provider. Your safety and comfort remain our highest priority. Thank you for your understanding and cooperation. (MD Name)

## 2025-03-11 NOTE — NURSING
Ferrlecit IV administered, pt tolerated well. Discharged home in stable condtion. Next appt given to pt and family.

## 2025-03-12 ENCOUNTER — OFFICE VISIT (OUTPATIENT)
Dept: FAMILY MEDICINE | Facility: CLINIC | Age: 89
End: 2025-03-12
Payer: MEDICARE

## 2025-03-12 VITALS
TEMPERATURE: 99 F | HEART RATE: 64 BPM | WEIGHT: 117 LBS | HEIGHT: 62 IN | SYSTOLIC BLOOD PRESSURE: 132 MMHG | OXYGEN SATURATION: 97 % | BODY MASS INDEX: 21.53 KG/M2 | DIASTOLIC BLOOD PRESSURE: 72 MMHG

## 2025-03-12 DIAGNOSIS — I20.9 ANGINA PECTORIS: Primary | ICD-10-CM

## 2025-03-12 DIAGNOSIS — R54 ADVANCED AGE: ICD-10-CM

## 2025-03-12 DIAGNOSIS — D50.0 IRON DEFICIENCY ANEMIA DUE TO CHRONIC BLOOD LOSS: Primary | ICD-10-CM

## 2025-03-12 DIAGNOSIS — N18.4 STAGE 4 CHRONIC KIDNEY DISEASE: ICD-10-CM

## 2025-03-12 PROCEDURE — 99214 OFFICE O/P EST MOD 30 MIN: CPT | Mod: ,,, | Performed by: FAMILY MEDICINE

## 2025-03-12 NOTE — PROGRESS NOTES
"SUBJECTIVE:  HPI    Leatha Adames is a 89 y.o. female here for MCR 1 mth f/u .   History of Present Illness    CHIEF COMPLAINT:  Patient presents today for follow up of iron infusions and angina symptoms    ANGINA:  She reports recent improvement in angina symptoms with no nitroglycerin use in the past 3 days, compared to previously using up to 8 nitroglycerin tablets in one night. Symptoms primarily occur at bedtime when things are quiet. She describes sensation of feeling heart "running" with associated discomfort. She continues Isosorbide 60 mg in the morning and 30 mg at night, Ranexa, and magnesium supplementation.    IRON DEFICIENCY:  She completed four iron infusions with subsequent improvement in symptoms, including decreased shortness of breath and increased ability to perform activities of daily living independently with walker assistance, including showering.    MEDICAL HISTORY:  She has a complex medical history including chronic kidney disease, valvular heart disease, and coronary artery disease.            Curts allergies, medications, history, and problem list were updated as appropriate.    ROS:  Pertinent ROS as above, otherwise negative    OBJECTIVE:  Vital signs  Visit Vitals  /72 (BP Location: Left arm, Patient Position: Sitting)   Pulse 64   Temp 98.5 °F (36.9 °C) (Temporal)   Ht 5' 2.01" (1.575 m)   Wt 53.1 kg (117 lb)   SpO2 97%   BMI 21.39 kg/m²          PHYSICAL EXAM:  General: Awake, alert, no acute distress      ASSESSMENT/PLAN:  1. 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  If she fails to respond to the above, I would recommend hospice care in light of the multitude and severity of her chronic conditions      2. Stage 4 chronic kidney disease  Overview:  Kidney Failure Risk Equation (Tangri)    Kidney Failure Risk at 2 years: 7.3%    Kidney Failure Risk at 5 years: 21.2%    Lab Results   Component Value Date    MICALBCREAT 247.6 (H) 02/03/2025    CREATININE 2.05 (H) 02/03/2025         Assessment & Plan:  Stable    Avoid NSAIDs    Followed by Nephrology      3. Advanced age  Assessment & Plan:  As long as she continues to benefit from IV iron and erythropoietin, we will continue.  If she fails to respond to the above, I would recommend hospice care in light of the multitude and severity of her chronic conditions              Follow Up:  Follow up in about 2 months (around 5/13/2025) for chronic health conditions.        This note was generated with the assistance of ambient listening technology. Verbal consent was obtained by the patient and accompanying visitor(s) for the recording of patient appointment to facilitate this note. I attest to having reviewed and edited the generated note for accuracy, though some syntax or spelling errors may persist. Please contact the author of this note for any clarification.

## 2025-03-12 NOTE — ASSESSMENT & PLAN NOTE
Improved after IV iron infusion     Continues erythropoietin    As long as she continues to benefit from IV iron and erythropoietin, we will continue.  If she fails to respond to the above, I would recommend hospice care in light of the multitude and severity of her chronic conditions

## 2025-03-12 NOTE — ASSESSMENT & PLAN NOTE
As long as she continues to benefit from IV iron and erythropoietin, we will continue.  If she fails to respond to the above, I would recommend hospice care in light of the multitude and severity of her chronic conditions

## 2025-03-13 RX ORDER — ISOSORBIDE MONONITRATE 60 MG/1
60 TABLET, EXTENDED RELEASE ORAL 2 TIMES DAILY
Qty: 180 TABLET | Refills: 3 | Status: SHIPPED | OUTPATIENT
Start: 2025-03-13

## 2025-03-31 ENCOUNTER — LAB VISIT (OUTPATIENT)
Dept: LAB | Facility: HOSPITAL | Age: 89
End: 2025-03-31
Attending: INTERNAL MEDICINE
Payer: MEDICARE

## 2025-03-31 ENCOUNTER — INFUSION (OUTPATIENT)
Facility: HOSPITAL | Age: 89
End: 2025-03-31
Attending: INTERNAL MEDICINE
Payer: MEDICARE

## 2025-03-31 DIAGNOSIS — D63.1 ANEMIA DUE TO STAGE 4 CHRONIC KIDNEY DISEASE TREATED WITH ERYTHROPOIETIN: ICD-10-CM

## 2025-03-31 DIAGNOSIS — N18.4 ANEMIA DUE TO STAGE 4 CHRONIC KIDNEY DISEASE TREATED WITH ERYTHROPOIETIN: ICD-10-CM

## 2025-03-31 LAB
HCT VFR BLD AUTO: 33.5 % (ref 36–48)
HGB BLD-MCNC: 10.4 G/DL (ref 11.8–16)

## 2025-03-31 PROCEDURE — 36415 COLL VENOUS BLD VENIPUNCTURE: CPT

## 2025-03-31 PROCEDURE — 85014 HEMATOCRIT: CPT

## 2025-04-02 LAB
LEFT EYE DM RETINOPATHY: NEGATIVE
RIGHT EYE DM RETINOPATHY: NEGATIVE

## 2025-04-24 DIAGNOSIS — K21.9 GASTROESOPHAGEAL REFLUX DISEASE, UNSPECIFIED WHETHER ESOPHAGITIS PRESENT: ICD-10-CM

## 2025-04-25 DIAGNOSIS — D63.1 ANEMIA DUE TO STAGE 4 CHRONIC KIDNEY DISEASE TREATED WITH ERYTHROPOIETIN: Primary | ICD-10-CM

## 2025-04-25 DIAGNOSIS — N18.4 ANEMIA DUE TO STAGE 4 CHRONIC KIDNEY DISEASE TREATED WITH ERYTHROPOIETIN: Primary | ICD-10-CM

## 2025-04-25 RX ORDER — PANTOPRAZOLE SODIUM 40 MG/1
40 TABLET, DELAYED RELEASE ORAL
Qty: 90 TABLET | Refills: 3 | Status: SHIPPED | OUTPATIENT
Start: 2025-04-25

## 2025-04-28 ENCOUNTER — INFUSION (OUTPATIENT)
Facility: HOSPITAL | Age: 89
End: 2025-04-28
Attending: INTERNAL MEDICINE
Payer: MEDICARE

## 2025-04-28 ENCOUNTER — LAB VISIT (OUTPATIENT)
Dept: LAB | Facility: HOSPITAL | Age: 89
End: 2025-04-28
Attending: INTERNAL MEDICINE
Payer: MEDICARE

## 2025-04-28 VITALS
BODY MASS INDEX: 22.45 KG/M2 | HEART RATE: 73 BPM | RESPIRATION RATE: 18 BRPM | TEMPERATURE: 98 F | OXYGEN SATURATION: 99 % | DIASTOLIC BLOOD PRESSURE: 64 MMHG | HEIGHT: 62 IN | WEIGHT: 122 LBS | SYSTOLIC BLOOD PRESSURE: 174 MMHG

## 2025-04-28 DIAGNOSIS — D63.1 ANEMIA OF CHRONIC RENAL FAILURE, UNSPECIFIED CKD STAGE: ICD-10-CM

## 2025-04-28 DIAGNOSIS — E11.59 TYPE 2 DIABETES MELLITUS WITH OTHER CIRCULATORY COMPLICATIONS: ICD-10-CM

## 2025-04-28 DIAGNOSIS — N18.9 ANEMIA OF CHRONIC RENAL FAILURE, UNSPECIFIED CKD STAGE: ICD-10-CM

## 2025-04-28 DIAGNOSIS — D50.0 IRON DEFICIENCY ANEMIA DUE TO CHRONIC BLOOD LOSS: ICD-10-CM

## 2025-04-28 DIAGNOSIS — N18.4 CHRONIC KIDNEY DISEASE, STAGE IV (SEVERE): ICD-10-CM

## 2025-04-28 DIAGNOSIS — E78.00 PURE HYPERCHOLESTEROLEMIA: ICD-10-CM

## 2025-04-28 DIAGNOSIS — D63.1 ANEMIA DUE TO STAGE 4 CHRONIC KIDNEY DISEASE TREATED WITH ERYTHROPOIETIN: Primary | ICD-10-CM

## 2025-04-28 DIAGNOSIS — I10 HYPERTENSION, UNSPECIFIED TYPE: ICD-10-CM

## 2025-04-28 DIAGNOSIS — N18.4 ANEMIA DUE TO STAGE 4 CHRONIC KIDNEY DISEASE TREATED WITH ERYTHROPOIETIN: Primary | ICD-10-CM

## 2025-04-28 DIAGNOSIS — N18.32 STAGE 3B CHRONIC KIDNEY DISEASE: ICD-10-CM

## 2025-04-28 LAB
ALBUMIN SERPL-MCNC: 4.5 G/DL (ref 3.4–5)
BACTERIA #/AREA URNS AUTO: NORMAL /HPF
BASOPHILS # BLD AUTO: 0.05 X10(3)/MCL (ref 0.01–0.08)
BASOPHILS NFR BLD AUTO: 0.7 % (ref 0.1–1.2)
BILIRUB UR QL STRIP.AUTO: NEGATIVE
BUN SERPL-MCNC: 29 MG/DL (ref 7–20)
CALCIUM SERPL-MCNC: 10 MG/DL (ref 8.4–10.2)
CALCIUM SERPL-MCNC: 9.9 MG/DL (ref 8.4–10.2)
CHLORIDE SERPL-SCNC: 100 MMOL/L (ref 98–110)
CLARITY UR: CLEAR
CO2 SERPL-SCNC: 30 MMOL/L (ref 21–32)
COLOR UR AUTO: YELLOW
CREAT SERPL-MCNC: 1.77 MG/DL (ref 0.66–1.25)
CREAT UR-MCNC: 27.3 MG/DL
CREAT/UREA NIT SERPL: 16 (ref 12–20)
EOSINOPHIL # BLD AUTO: 0.27 X10(3)/MCL (ref 0.04–0.36)
EOSINOPHIL NFR BLD AUTO: 3.6 % (ref 0.7–7)
ERYTHROCYTE [DISTWIDTH] IN BLOOD BY AUTOMATED COUNT: 16.5 % (ref 11–14.5)
GFR SERPLBLD CREATININE-BSD FMLA CKD-EPI: 27 ML/MIN/1.73/M2
GLUCOSE SERPL-MCNC: 157 MG/DL (ref 70–115)
GLUCOSE UR QL STRIP: 500
HCT VFR BLD AUTO: 36.5 % (ref 36–48)
HGB BLD-MCNC: 11.4 G/DL (ref 11.8–16)
HGB UR QL STRIP: NEGATIVE
IMM GRANULOCYTES # BLD AUTO: 0.04 X10(3)/MCL (ref 0–0.03)
IMM GRANULOCYTES NFR BLD AUTO: 0.5 % (ref 0–0.5)
KETONES UR QL STRIP: NEGATIVE
LEUKOCYTE ESTERASE UR QL STRIP: ABNORMAL
LYMPHOCYTES # BLD AUTO: 2.01 X10(3)/MCL (ref 1.16–3.74)
LYMPHOCYTES NFR BLD AUTO: 26.4 % (ref 20–55)
MCH RBC QN AUTO: 28.4 PG (ref 27–34)
MCHC RBC AUTO-ENTMCNC: 31.2 G/DL (ref 31–37)
MCV RBC AUTO: 90.8 FL (ref 79–99)
MONOCYTES # BLD AUTO: 0.52 X10(3)/MCL (ref 0.24–0.36)
MONOCYTES NFR BLD AUTO: 6.8 % (ref 4.7–12.5)
NEUTROPHILS # BLD AUTO: 4.71 X10(3)/MCL (ref 1.56–6.13)
NEUTROPHILS NFR BLD AUTO: 62 % (ref 37–73)
NITRITE UR QL STRIP: NEGATIVE
NRBC BLD AUTO-RTO: 0 %
PH UR STRIP: 6 [PH]
PHOSPHATE SERPL-MCNC: 4.2 MG/DL (ref 2.5–4.9)
PLATELET # BLD AUTO: 205 X10(3)/MCL (ref 140–371)
PMV BLD AUTO: 9.2 FL (ref 9.4–12.4)
POTASSIUM SERPL-SCNC: 4.5 MMOL/L (ref 3.5–5.1)
PROT UR QL STRIP: NEGATIVE
PROT UR STRIP-MCNC: 22 MG/DL
PTH-INTACT SERPL-MCNC: 30.8 PG/ML (ref 14–73)
RBC # BLD AUTO: 4.02 X10(6)/MCL (ref 4–5.1)
RBC #/AREA URNS AUTO: NORMAL /HPF
SODIUM SERPL-SCNC: 138 MMOL/L (ref 136–145)
SP GR UR STRIP.AUTO: 1.01 (ref 1–1.03)
SQUAMOUS #/AREA URNS AUTO: NORMAL /HPF
URINE PROTEIN/CREATININE RATIO (OLG): 0.8
UROBILINOGEN UR STRIP-ACNC: 0.2
WBC # BLD AUTO: 7.6 X10(3)/MCL (ref 4–11.5)
WBC #/AREA URNS AUTO: NORMAL /HPF

## 2025-04-28 PROCEDURE — 81003 URINALYSIS AUTO W/O SCOPE: CPT

## 2025-04-28 PROCEDURE — 82570 ASSAY OF URINE CREATININE: CPT

## 2025-04-28 PROCEDURE — 36415 COLL VENOUS BLD VENIPUNCTURE: CPT

## 2025-04-28 PROCEDURE — 81015 MICROSCOPIC EXAM OF URINE: CPT

## 2025-04-28 PROCEDURE — 85025 COMPLETE CBC W/AUTO DIFF WBC: CPT

## 2025-04-28 PROCEDURE — 80069 RENAL FUNCTION PANEL: CPT

## 2025-04-28 PROCEDURE — 83970 ASSAY OF PARATHORMONE: CPT

## 2025-05-06 ENCOUNTER — OFFICE VISIT (OUTPATIENT)
Dept: FAMILY MEDICINE | Facility: CLINIC | Age: 89
End: 2025-05-06
Payer: MEDICARE

## 2025-05-06 VITALS
DIASTOLIC BLOOD PRESSURE: 52 MMHG | WEIGHT: 123.63 LBS | TEMPERATURE: 97 F | SYSTOLIC BLOOD PRESSURE: 154 MMHG | BODY MASS INDEX: 22.75 KG/M2 | HEART RATE: 61 BPM | HEIGHT: 62 IN | OXYGEN SATURATION: 96 %

## 2025-05-06 DIAGNOSIS — D50.0 IRON DEFICIENCY ANEMIA DUE TO CHRONIC BLOOD LOSS: Primary | ICD-10-CM

## 2025-05-06 DIAGNOSIS — I34.2 NONRHEUMATIC MITRAL VALVE STENOSIS: ICD-10-CM

## 2025-05-06 DIAGNOSIS — N18.4 STAGE 4 CHRONIC KIDNEY DISEASE: ICD-10-CM

## 2025-05-06 DIAGNOSIS — E11.59 TYPE 2 DIABETES MELLITUS WITH OTHER CIRCULATORY COMPLICATIONS: ICD-10-CM

## 2025-05-06 DIAGNOSIS — I13.0 HYPERTENSIVE HEART AND RENAL DISEASE WITH RENAL FAILURE, STAGE 1 THROUGH STAGE 4 OR UNSPECIFIED CHRONIC KIDNEY DISEASE, WITH HEART FAILURE: ICD-10-CM

## 2025-05-06 DIAGNOSIS — I35.0 SEVERE AORTIC VALVE STENOSIS: ICD-10-CM

## 2025-05-06 DIAGNOSIS — I50.32 CHRONIC DIASTOLIC (CONGESTIVE) HEART FAILURE: ICD-10-CM

## 2025-05-06 DIAGNOSIS — N18.4 ANEMIA DUE TO STAGE 4 CHRONIC KIDNEY DISEASE TREATED WITH ERYTHROPOIETIN: ICD-10-CM

## 2025-05-06 DIAGNOSIS — D63.1 ANEMIA DUE TO STAGE 4 CHRONIC KIDNEY DISEASE TREATED WITH ERYTHROPOIETIN: ICD-10-CM

## 2025-05-06 DIAGNOSIS — N39.42 URINARY INCONTINENCE WITHOUT SENSORY AWARENESS: ICD-10-CM

## 2025-05-06 DIAGNOSIS — Z90.13 HISTORY OF BILATERAL MASTECTOMY: ICD-10-CM

## 2025-05-06 PROCEDURE — 99214 OFFICE O/P EST MOD 30 MIN: CPT | Mod: ,,, | Performed by: FAMILY MEDICINE

## 2025-05-06 RX ORDER — ERYTHROMYCIN 5 MG/G
OINTMENT OPHTHALMIC
COMMUNITY
Start: 2025-04-04

## 2025-05-06 RX ORDER — CYCLOSPORINE 0.5 MG/ML
EMULSION OPHTHALMIC
COMMUNITY
Start: 2025-04-24

## 2025-05-06 RX ORDER — FESOTERODINE FUMARATE 8 MG/1
1 TABLET, FILM COATED, EXTENDED RELEASE ORAL DAILY
Qty: 90 TABLET | Refills: 3 | Status: SHIPPED | OUTPATIENT
Start: 2025-05-06 | End: 2026-05-06

## 2025-05-06 NOTE — PROGRESS NOTES
"SUBJECTIVE:  HPI    Leatha Adames is a 89 y.o. female here for Follow-up (2 month).     She reports increase in urinary incontinence episodes with frequently having leaking of bladder prior to getting to the toilet.  Her symptoms improved briefly with Toviaz 4 mg daily.    She reports that she feels remarkably better since receiving her IV iron infusion and her hemoglobin returning to a more normal range.  She does continue to have some exertional dyspnea and angina but is not having any anginal symptoms at rest.    She had a follow-up with her nephrologist yesterday.  She is scheduled to be seen again in 4 months.    She also recently had a follow up with Cardiology.  She is scheduled to be seen again in 6 months.      Curts allergies, medications, history, and problem list were updated as appropriate.    ROS:  Pertinent ROS as above, otherwise negative    OBJECTIVE:  Vital signs  Visit Vitals  BP (!) 154/52 (BP Location: Right arm, Patient Position: Sitting)   Pulse 61   Temp 97.1 °F (36.2 °C) (Temporal)   Ht 5' 2.01" (1.575 m)   Wt 56.1 kg (123 lb 9.6 oz)   SpO2 96%   BMI 22.60 kg/m²          PHYSICAL EXAM:  General:  Awake, alert, no acute distress   Eyes:  Pupils equal, round, reactive to light.  Conjunctiva normal bilaterally.  Cardiovascular: Regular rate and rhythm.  2/6 systolic murmur.  Respiratory: Clear to auscultation bilaterally, normal effort  Extremities:  No peripheral edema, no cyanosis  Skin: No rashes    Chemistry:  Lab Results   Component Value Date     04/28/2025    K 4.5 04/28/2025    BUN 29 (H) 04/28/2025    CREATININE 1.77 (H) 04/28/2025    EGFRNORACEVR 27 04/28/2025    CALCIUM 10.0 04/28/2025    CALCIUM 9.9 04/28/2025    ALKPHOS 45 (L) 02/03/2025    AST 27 02/03/2025    ALT 14 02/03/2025    MG 1.40 (L) 01/02/2025    PHOS 4.2 04/28/2025    TSH 3.910 (H) 06/24/2024    VAILXH1HCBY 0.89 06/24/2024        Lab Results   Component Value Date    HGBA1C 6.5 (H) 02/03/2025    "     Hematology:  Lab Results   Component Value Date    WBC 7.60 04/28/2025    HGB 11.4 (L) 04/28/2025    MCV 90.8 04/28/2025     04/28/2025       Lipid Panel:  Lab Results   Component Value Date    CHOL 150 11/01/2024    HDL 67 (H) 11/01/2024    LDLDIRECT 64.8 11/01/2024    TRIG 125 11/01/2024        ASSESSMENT/PLAN:  1. 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 loss  Overview:  Lab Results   Component Value Date    WBC 7.60 04/28/2025    HGB 11.4 (L) 04/28/2025    HCT 36.5 04/28/2025    MCV 90.8 04/28/2025     04/28/2025       Lab Results   Component Value Date    UIBC 233 03/03/2025    IRON 62 03/03/2025    TRANSFERRIN 268 03/03/2025    TIBC 295 03/03/2025    LABIRON 21 03/03/2025      Lab Results   Component Value Date    FERRITIN 11.6 01/02/2025           Assessment & Plan:  Improved after IV iron    Orders:  -     CBC Auto Differential; Future; Expected date: 06/06/2025  -     Ferritin; Future; Expected date: 06/06/2025  -     Iron and TIBC; Future; Expected date: 06/06/2025    2. History of bilateral mastectomy  Overview:  1995 bilateral mastectomy for breast cancer    Assessment & Plan:  Mastectomy bras and prosthesis      3. Anemia due to stage 4 chronic kidney disease treated with erythropoietin  Overview:  Lab Results   Component Value Date    WBC 7.60 04/28/2025    HGB 11.4 (L) 04/28/2025    HCT 36.5 04/28/2025    MCV 90.8 04/28/2025     04/28/2025           Assessment & Plan:  On erythropoietin    Status post iron infusion      4. Type 2 diabetes mellitus with other circulatory complications  Overview:  Lab Results   Component Value Date    HGBA1C 6.5 (H) 02/03/2025         Assessment & Plan:  Jardiance 10 mg daily    Orders:  -     Microalbumin/Creatinine Ratio, Urine; Future; Expected date: 06/06/2025    5. Stage 4 chronic kidney disease  Overview:  Kidney Failure Risk Equation (Tangri)    Kidney Failure Risk at 2 years: 4.8%    Kidney Failure Risk at 5 years: 14.1%    Lab  Results   Component Value Date    MICALBCREAT 247.6 (H) 02/03/2025    CREATININE 1.77 (H) 04/28/2025         Assessment & Plan:  Stable and actually improved    On Jardiance    Avoid NSAIDs    Followed by Nephrology    Orders:  -     Comprehensive Metabolic Panel; Future; Expected date: 06/06/2025  -     Microalbumin/Creatinine Ratio, Urine; Future; Expected date: 06/06/2025    6. 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 Chronic diastolic (congestive) heart failure  Assessment & Plan:  Lasix, Imdur, Jardiance, Ranexa      8. Severe aortic valve stenosis  Overview:  October 24, 2024 echocardiogram:  EF 55%   Aortic valve area 0.5 sq cm  Moderate to severe tricuspid regurgitation  Right ventricular systolic pressure 72 mmHg  Severe mitral stenosis and moderate mitral regurgitation    Assessment & Plan:  She is on maximum medical therapy and is not a surgical candidate          9. Nonrheumatic mitral valve stenosis  Overview:  November 2, 2023 echocardiogram, moderate mitral stenosis with moderate posteriorly directed mitral valve regurgitation    October 24, 2024 echocardiogram:  EF 55%   Aortic valve area 0.5 sq cm  Moderate to severe tricuspid regurgitation  Right ventricular systolic pressure 72 mmHg  Severe mitral stenosis and moderate mitral regurgitation      Assessment & Plan:  On maximal medical therapy and is not a surgical candidate      10. Urinary incontinence without sensory awareness  Assessment & Plan:  Increase Toviaz to 8 mg daily    Orders:  -     fesoterodine (TOVIAZ) 8 mg Tb24; Take 1 tablet by mouth Daily.  Dispense: 90 tablet; Refill: 3      Follow Up:  Follow up in about 2 months (around 7/6/2025) for chronic health conditions, Fasting labs.      This note was generated with the assistance of ambient listening technology. Verbal  consent was obtained by the patient and accompanying visitor(s) for the recording of patient appointment to facilitate this note. I attest to having reviewed and edited the generated note for accuracy, though some syntax or spelling errors may persist. Please contact the author of this note for any clarification.

## 2025-05-09 ENCOUNTER — OFFICE VISIT (OUTPATIENT)
Dept: FAMILY MEDICINE | Facility: CLINIC | Age: 89
End: 2025-05-09
Payer: MEDICARE

## 2025-05-09 VITALS
HEIGHT: 62 IN | TEMPERATURE: 97 F | HEART RATE: 74 BPM | DIASTOLIC BLOOD PRESSURE: 74 MMHG | SYSTOLIC BLOOD PRESSURE: 136 MMHG | BODY MASS INDEX: 22.82 KG/M2 | WEIGHT: 124 LBS | OXYGEN SATURATION: 97 %

## 2025-05-09 DIAGNOSIS — L25.9 CONTACT DERMATITIS, UNSPECIFIED CONTACT DERMATITIS TYPE, UNSPECIFIED TRIGGER: Primary | ICD-10-CM

## 2025-05-09 RX ORDER — PREDNISONE 20 MG/1
20 TABLET ORAL DAILY
Qty: 3 TABLET | Refills: 0 | Status: SHIPPED | OUTPATIENT
Start: 2025-05-09 | End: 2025-05-12

## 2025-05-09 RX ORDER — TRIAMCINOLONE ACETONIDE 1 MG/G
CREAM TOPICAL 2 TIMES DAILY
Qty: 28.4 G | Refills: 0 | Status: SHIPPED | OUTPATIENT
Start: 2025-05-09 | End: 2025-05-16

## 2025-05-09 NOTE — PROGRESS NOTES
Patient ID: Leatha Adames  : [id number]    Chief Complaint: Rash    Allergies: Patient is allergic to ace inhibitors, amoxicillin-pot clavulanate, celecoxib, codeine, hydrocodone-acetaminophen, irbesartan, and penicillins.     History of Present Illness:  The patient is a 89 y.o. White female who presents to clinic for follow up on Rash.  Reports red rash that began about 2 days ago.  She describes the rash as extremely pruritic.  Initially present to left arm now present to both arms, trunk, and neck.  Denies any changes in lotions or soaps.  No new medications.  No fever or systemic symptoms.  She has a small dog in the home but does not think that it has fleas.      Social History:  reports that she has quit smoking. She has never been exposed to tobacco smoke. She has never used smokeless tobacco. She reports that she does not currently use alcohol. She reports current drug use. Drug: Other-see comments.    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    Current Medications:  Current Outpatient Medications   Medication Instructions    acetaminophen (TYLENOL) 1,000 mg, Every 8 hours PRN    albuterol (PROVENTIL/VENTOLIN HFA) 90 mcg/actuation inhaler 2 puffs, 4 times daily PRN     "aspirin 81 MG Chew aspirin 81 mg chewable tablet   Chew 1 tablet every day by oral route.    atorvastatin (LIPITOR) 40 mg, Oral    azelastine (ASTELIN) 137 mcg, Nasal, 2 times daily    calcitRIOL (ROCALTROL) 0.5 mcg, Oral    colestipoL (COLESTID) 5 g, 2 times daily    cycloSPORINE (RESTASIS) 0.05 % ophthalmic emulsion     empagliflozin (JARDIANCE) 10 mg, Oral, Daily    erythromycin (ROMYCIN) ophthalmic ointment SMARTSIG:sparingly In Eye(s) Every Night    fesoterodine (TOVIAZ) 8 mg Tb24 1 tablet, Oral, Daily    furosemide (LASIX) 40 mg, Daily    isosorbide mononitrate (IMDUR) 60 mg, Oral, 2 times daily    loratadine (CLARITIN) 10 mg, Daily    magnesium oxide 400 mg magnesium Tab 1 tablet, Oral, 2 times daily    metoprolol succinate (TOPROL-XL) 25 mg    nitroGLYCERIN (NITROSTAT) 0.4 mg, Sublingual, Every 5 min PRN    pantoprazole (PROTONIX) 40 mg, Oral    predniSONE (DELTASONE) 20 mg, Oral, Daily    ranolazine (RANEXA) 500 mg, Daily    traMADoL (ULTRAM) 50 mg, Oral, Every 8 hours PRN    triamcinolone acetonide 0.1% (KENALOG) 0.1 % cream Topical (Top), 2 times daily    UNABLE TO FIND medication name: CBD Tincture 0.5 under tongue every 4-6 hours for pain    UNABLE TO FIND medication name: Leg Cramps by Hylands 356 mg 4 times daily    venlafaxine (EFFEXOR-XR) 150 mg, Oral    vitamin D (VITAMIN D3) 1,000 Units, Daily    vitamin E 800 Units, Daily       ROS: See HPI    Visit Vitals  /74 (BP Location: Right arm, Patient Position: Sitting)   Pulse 74   Temp 97.3 °F (36.3 °C) (Temporal)   Ht 5' 2.01" (1.575 m)   Wt 56.2 kg (124 lb)   SpO2 97%   BMI 22.67 kg/m²       Physical Exam  Vitals reviewed.   Constitutional:       Appearance: Normal appearance.   Cardiovascular:      Rate and Rhythm: Normal rate and regular rhythm.      Heart sounds: Murmur (systolic) heard.   Pulmonary:      Effort: Pulmonary effort is normal.      Breath sounds: Normal breath sounds.   Skin:     General: Skin is warm and dry.      Comments: " Several erythematous papules to chest, upper back, and bilateral upper extremities.  See attached image   Neurological:      Mental Status: She is alert.            Assessment & Plan:  1. Contact dermatitis, unspecified contact dermatitis type, unspecified trigger  -     predniSONE (DELTASONE) 20 MG tablet; Take 1 tablet (20 mg total) by mouth once daily. for 3 days  Dispense: 3 tablet; Refill: 0  -     triamcinolone acetonide 0.1% (KENALOG) 0.1 % cream; Apply topically 2 (two) times daily. for 7 days  Dispense: 28.4 g; Refill: 0     Discussed the possibility of insect bites   Treat with prednisone 20 mg daily for 3 days and triamcinolone b.i.d.   Avoid scratching.  Call with any concern for secondary infection    Future Appointments   Date Time Provider Department Center   5/13/2025  1:00 PM CHAIR 03, [facility]   5/26/2025 10:00 AM LAB, [facility]   5/26/2025 10:30 AM CHAIR 02, [facility]   7/2/2025  9:30 AM LAB, [facility] LABORATORY DRAW STATION [facility] JARAD Sahu [facility]   7/8/2025 11:30 AM Dennys Linares MD [facility] Ad[place]ina [facility]   10/8/2025  1:40 PM Colleen Young FNP [facility]  [place]   11/14/2025 10:00 AM CHAIR 02, [facility]       No follow-ups on file. Call sooner if needed.    SYLWIA Cade    Lab Frequency Next Occurrence   CBC Auto Differential Once 06/06/2025   Ferritin Once 06/06/2025   Iron and TIBC Once 06/06/2025   Comprehensive Metabolic Panel Once 06/06/2025   Microalbumin/Creatinine Ratio, Urine Once 06/06/2025   Hemoglobin and Hematocrit Every 4 Weeks 5/23/2025, 6/20/2025, 7/18/2025, 8/15/2025, 9/12/2025, 10/10/2025

## 2025-05-13 ENCOUNTER — INFUSION (OUTPATIENT)
Facility: HOSPITAL | Age: 89
End: 2025-05-13
Attending: FAMILY MEDICINE
Payer: MEDICARE

## 2025-05-13 VITALS
WEIGHT: 125.38 LBS | HEART RATE: 70 BPM | OXYGEN SATURATION: 99 % | TEMPERATURE: 98 F | HEIGHT: 62 IN | BODY MASS INDEX: 23.07 KG/M2 | RESPIRATION RATE: 20 BRPM | SYSTOLIC BLOOD PRESSURE: 168 MMHG | DIASTOLIC BLOOD PRESSURE: 68 MMHG

## 2025-05-13 DIAGNOSIS — M81.0 AGE-RELATED OSTEOPOROSIS WITHOUT CURRENT PATHOLOGICAL FRACTURE: Primary | ICD-10-CM

## 2025-05-13 PROCEDURE — 63600175 PHARM REV CODE 636 W HCPCS: Mod: JZ | Performed by: FAMILY MEDICINE

## 2025-05-13 PROCEDURE — 96372 THER/PROPH/DIAG INJ SC/IM: CPT

## 2025-05-13 RX ADMIN — DENOSUMAB 60 MG: 60 INJECTION SUBCUTANEOUS at 01:05

## 2025-05-19 ENCOUNTER — OFFICE VISIT (OUTPATIENT)
Dept: FAMILY MEDICINE | Facility: CLINIC | Age: 89
End: 2025-05-19
Payer: MEDICARE

## 2025-05-19 VITALS
DIASTOLIC BLOOD PRESSURE: 62 MMHG | TEMPERATURE: 99 F | HEIGHT: 62 IN | OXYGEN SATURATION: 97 % | BODY MASS INDEX: 22.85 KG/M2 | SYSTOLIC BLOOD PRESSURE: 136 MMHG | HEART RATE: 69 BPM | WEIGHT: 124.19 LBS

## 2025-05-19 DIAGNOSIS — M54.41 ACUTE BILATERAL LOW BACK PAIN WITH RIGHT-SIDED SCIATICA: Primary | ICD-10-CM

## 2025-05-19 PROCEDURE — 99213 OFFICE O/P EST LOW 20 MIN: CPT | Mod: ,,, | Performed by: NURSE PRACTITIONER

## 2025-05-19 RX ORDER — PREDNISONE 20 MG/1
20 TABLET ORAL 2 TIMES DAILY
Qty: 14 TABLET | Refills: 0 | Status: SHIPPED | OUTPATIENT
Start: 2025-05-19 | End: 2025-05-26

## 2025-05-19 NOTE — PROGRESS NOTES
"SUBJECTIVE:  Leatha Adames is a 89 y.o. female here for Hip Pain (Right )      HPI  Presents to the clinic with c/o back/hip pain with radiation down her right leg.  She has had the pain on and off for several weeks and forgot to mention it to Dr Linares.  She has had increasing pain and feel now like her leg will give out.  It starts in her low back and extends into her hip and down her right leg.  She was given 3 days of steroids recently for dermatitis, but cannot remember if her pain was improved or not.  Curts allergies, medications, history, and problem list were updated as appropriate.    Review of Systems   A comprehensive review of symptoms was completed and negative except as noted above.    No results found for this or any previous visit (from the past 3 weeks).    OBJECTIVE:  Vital signs  Vitals:    05/19/25 1532   BP: 136/62   BP Location: Right arm   Patient Position: Sitting   Pulse: 69   Temp: 98.8 °F (37.1 °C)   TempSrc: Oral   SpO2: 97%   Weight: 56.3 kg (124 lb 3.2 oz)   Height: 5' 2.01" (1.575 m)        Physical Exam  Constitutional:       Appearance: Normal appearance.   HENT:      Head: Normocephalic and atraumatic.      Nose: Nose normal.      Mouth/Throat:      Mouth: Mucous membranes are moist.      Pharynx: Oropharynx is clear.   Eyes:      Extraocular Movements: Extraocular movements intact.      Conjunctiva/sclera: Conjunctivae normal.   Cardiovascular:      Rate and Rhythm: Normal rate and regular rhythm.   Pulmonary:      Effort: Pulmonary effort is normal.      Breath sounds: Normal breath sounds.   Musculoskeletal:      Cervical back: Normal range of motion and neck supple.      Lumbar back: Tenderness present. Positive right straight leg raise test.      Comments: Is able to stand and put weight on right leg.  Uses rollator as wheelchair for distances   Skin:     General: Skin is warm.   Neurological:      Mental Status: She is alert and oriented to person, place, and time.      " Gait: Abnormal gait: uses rollator.          ASSESSMENT/PLAN:  1. Acute bilateral low back pain with right-sided sciatica  Comments:  complete steroids, follow-up if symptoms worsen or persist  Orders:  -     predniSONE (DELTASONE) 20 MG tablet; Take 1 tablet (20 mg total) by mouth 2 (two) times daily. for 7 days  Dispense: 14 tablet; Refill: 0        Follow Up:  Follow up if symptoms worsen or fail to improve.

## 2025-05-22 ENCOUNTER — TELEPHONE (OUTPATIENT)
Dept: FAMILY MEDICINE | Facility: CLINIC | Age: 89
End: 2025-05-22
Payer: MEDICARE

## 2025-05-26 ENCOUNTER — INFUSION (OUTPATIENT)
Facility: HOSPITAL | Age: 89
End: 2025-05-26
Attending: FAMILY MEDICINE
Payer: MEDICARE

## 2025-05-26 ENCOUNTER — APPOINTMENT (OUTPATIENT)
Dept: LAB | Facility: HOSPITAL | Age: 89
End: 2025-05-26
Attending: INTERNAL MEDICINE
Payer: MEDICARE

## 2025-05-26 DIAGNOSIS — N18.4 ANEMIA DUE TO STAGE 4 CHRONIC KIDNEY DISEASE TREATED WITH ERYTHROPOIETIN: ICD-10-CM

## 2025-05-26 DIAGNOSIS — D63.1 ANEMIA DUE TO STAGE 4 CHRONIC KIDNEY DISEASE TREATED WITH ERYTHROPOIETIN: ICD-10-CM

## 2025-05-26 LAB
HCT VFR BLD AUTO: 37.2 % (ref 36–48)
HGB BLD-MCNC: 12.4 G/DL (ref 11.8–16)

## 2025-05-26 PROCEDURE — 85018 HEMOGLOBIN: CPT

## 2025-05-26 PROCEDURE — 36415 COLL VENOUS BLD VENIPUNCTURE: CPT

## 2025-05-28 ENCOUNTER — OFFICE VISIT (OUTPATIENT)
Dept: FAMILY MEDICINE | Facility: CLINIC | Age: 89
End: 2025-05-28
Payer: MEDICARE

## 2025-05-28 VITALS
TEMPERATURE: 99 F | BODY MASS INDEX: 22.82 KG/M2 | HEIGHT: 62 IN | WEIGHT: 124 LBS | SYSTOLIC BLOOD PRESSURE: 112 MMHG | HEART RATE: 64 BPM | OXYGEN SATURATION: 97 % | DIASTOLIC BLOOD PRESSURE: 64 MMHG

## 2025-05-28 DIAGNOSIS — M54.31 RIGHT SCIATIC NERVE PAIN: Primary | ICD-10-CM

## 2025-05-28 PROCEDURE — 99214 OFFICE O/P EST MOD 30 MIN: CPT | Mod: ,,, | Performed by: FAMILY MEDICINE

## 2025-05-28 NOTE — ASSESSMENT & PLAN NOTE
Anti-inflammatories are contraindicated because of underlying chronic kidney disease    Continue tramadol as needed for pain.      May use Tylenol every 4 hours, as well     Physical therapy 2-3 times per week for up to 4 weeks.  If fails to respond, can go to interventional pain management

## 2025-05-28 NOTE — PROGRESS NOTES
"SUBJECTIVE:  HPI    Leatha Adames is a 89 y.o. female here for RT Hip Pain and RT Shoulder Pain  (Fell yesterday).   History of Present Illness    CHIEF COMPLAINT:  Patient presents today for right lower back pain radiating down the leg    LOW BACK PAIN AND RADICULOPATHY:  She reports severe right lower back pain radiating down the lateral aspect of the right leg.  Her symptoms began several weeks ago but intensified after a fall yesterday.  Pain is rated 8/10 when present, with episodes of worsening that significantly limit mobility. Symptoms are exacerbated by lying on the affected side and sitting. She denies constant pain but experiences weakness and numbness in the affected leg. She has been taking tramadol with Tylenol for pain management. A trial of prednisone 20 mg provided mild relief.    RECENT INJURY:  Yesterday, she fell while attempting to get out of bed when she tried to grab the door frame but missed, landing on her back and injuring her shoulder. The fall was attributed to balance issues rather than leg weakness or tripping.            Leatha's allergies, medications, history, and problem list were updated as appropriate.    ROS:  Pertinent ROS as above, otherwise negative    OBJECTIVE:  Vital signs  Visit Vitals  /64 (BP Location: Left arm, Patient Position: Sitting)   Pulse 64   Temp 98.7 °F (37.1 °C) (Temporal)   Ht 5' 2.01" (1.575 m)   Wt 56.2 kg (124 lb)   SpO2 97%   BMI 22.67 kg/m²          PHYSICAL EXAM:  General: Awake, alert, no acute distress, sitting in her wheelchair in a mild amount of discomfort  Back: Tenderness to palpation along the right sciatic nerve in the gluteal area on the right.  Straight leg raise positive while sitting on the right.  Neurologic:  5/5 strength in bilateral lower extremity myotomes.  Sensation to light touch slightly diminished throughout the right lateral thigh, anteromedial lower leg and lateral right foot.  Reflexes 2+ at the knees and ankles " bilaterally.    X-ray lumbar spine, my interpretation prior to radiology report:  No compression fractures or vertebral fractures noted.  There is a retrolisthesis of L5 on L4.  Severe degenerative changes are noted at L2 and L3 with almost complete loss of disc space between L2 and L3 and anterior osteophytes are noted.  There is also facet arthropathy changes present from L3 through L5.  She has severe aortic calcifications noted.    X-ray right hip, my interpretation prior to radiology report:  No fractures are seen.  Arthritic changes within the femoroacetabular joint with a wedge defect in the femoral head on the right.  She also has findings consistent with severe constipation with large amount of stool present the pelvis.  Incidental note of arterial graft present in the right femoral artery region with vascular calcifications also noted in the proximal left thigh incidentally.      ASSESSMENT/PLAN:  1. Right sciatic nerve pain  Assessment & Plan:  Anti-inflammatories are contraindicated because of underlying chronic kidney disease    Continue tramadol as needed for pain.      May use Tylenol every 4 hours, as well     Physical therapy 2-3 times per week for up to 4 weeks.  If fails to respond, can go to interventional pain management    Orders:  -     X-Ray Lumbar Complete Including Flex And Ext; Future; Expected date: 05/28/2025  -     X-Ray Hip 2 or 3 views Right with Pelvis when performed; Future; Expected date: 05/28/2025  -     Ambulatory Referral/Consult to Physical Therapy; Future; Expected date: 06/04/2025            Follow Up:  As scheduled        This note was generated with the assistance of ambient listening technology. Verbal consent was obtained by the patient and accompanying visitor(s) for the recording of patient appointment to facilitate this note. I attest to having reviewed and edited the generated note for accuracy, though some syntax or spelling errors may persist. Please contact the  author of this note for any clarification.

## 2025-05-29 ENCOUNTER — PATIENT MESSAGE (OUTPATIENT)
Dept: FAMILY MEDICINE | Facility: CLINIC | Age: 89
End: 2025-05-29
Payer: MEDICARE

## 2025-06-18 ENCOUNTER — TELEPHONE (OUTPATIENT)
Dept: FAMILY MEDICINE | Facility: CLINIC | Age: 89
End: 2025-06-18
Payer: MEDICARE

## 2025-06-19 DIAGNOSIS — M15.0 PRIMARY OSTEOARTHRITIS INVOLVING MULTIPLE JOINTS: ICD-10-CM

## 2025-06-19 RX ORDER — TRAMADOL HYDROCHLORIDE 50 MG/1
50 TABLET, FILM COATED ORAL EVERY 8 HOURS PRN
Qty: 30 TABLET | Refills: 0 | Status: SHIPPED | OUTPATIENT
Start: 2025-06-19

## 2025-06-23 ENCOUNTER — LAB VISIT (OUTPATIENT)
Dept: LAB | Facility: HOSPITAL | Age: 89
End: 2025-06-23
Attending: INTERNAL MEDICINE
Payer: MEDICARE

## 2025-06-23 DIAGNOSIS — D63.1 ANEMIA DUE TO STAGE 4 CHRONIC KIDNEY DISEASE TREATED WITH ERYTHROPOIETIN: ICD-10-CM

## 2025-06-23 DIAGNOSIS — N18.4 ANEMIA DUE TO STAGE 4 CHRONIC KIDNEY DISEASE TREATED WITH ERYTHROPOIETIN: ICD-10-CM

## 2025-06-23 LAB
HCT VFR BLD AUTO: 33.6 % (ref 36–48)
HGB BLD-MCNC: 11 G/DL (ref 11.8–16)

## 2025-06-23 PROCEDURE — 85018 HEMOGLOBIN: CPT

## 2025-06-23 PROCEDURE — 36415 COLL VENOUS BLD VENIPUNCTURE: CPT

## 2025-07-03 PROCEDURE — 83550 IRON BINDING TEST: CPT | Performed by: FAMILY MEDICINE

## 2025-07-03 PROCEDURE — 82570 ASSAY OF URINE CREATININE: CPT | Performed by: FAMILY MEDICINE

## 2025-07-03 PROCEDURE — 85025 COMPLETE CBC W/AUTO DIFF WBC: CPT | Performed by: FAMILY MEDICINE

## 2025-07-03 PROCEDURE — 82728 ASSAY OF FERRITIN: CPT | Performed by: FAMILY MEDICINE

## 2025-07-03 PROCEDURE — 80053 COMPREHEN METABOLIC PANEL: CPT | Performed by: FAMILY MEDICINE

## 2025-07-08 ENCOUNTER — OFFICE VISIT (OUTPATIENT)
Dept: FAMILY MEDICINE | Facility: CLINIC | Age: 89
End: 2025-07-08
Payer: MEDICARE

## 2025-07-08 VITALS
HEIGHT: 62 IN | TEMPERATURE: 99 F | HEART RATE: 74 BPM | BODY MASS INDEX: 22.2 KG/M2 | SYSTOLIC BLOOD PRESSURE: 120 MMHG | WEIGHT: 120.63 LBS | DIASTOLIC BLOOD PRESSURE: 62 MMHG | OXYGEN SATURATION: 100 %

## 2025-07-08 DIAGNOSIS — E11.59 TYPE 2 DIABETES MELLITUS WITH OTHER CIRCULATORY COMPLICATIONS: ICD-10-CM

## 2025-07-08 DIAGNOSIS — I50.32 CHRONIC DIASTOLIC (CONGESTIVE) HEART FAILURE: ICD-10-CM

## 2025-07-08 DIAGNOSIS — R94.6 THYROID FUNCTION TEST ABNORMAL: ICD-10-CM

## 2025-07-08 DIAGNOSIS — N18.4 STAGE 4 CHRONIC KIDNEY DISEASE: ICD-10-CM

## 2025-07-08 DIAGNOSIS — I20.9 ANGINA PECTORIS: ICD-10-CM

## 2025-07-08 DIAGNOSIS — D50.0 IRON DEFICIENCY ANEMIA DUE TO CHRONIC BLOOD LOSS: Primary | ICD-10-CM

## 2025-07-08 PROCEDURE — 99214 OFFICE O/P EST MOD 30 MIN: CPT | Mod: ,,, | Performed by: FAMILY MEDICINE

## 2025-07-08 RX ORDER — FUROSEMIDE 20 MG/1
TABLET ORAL
Qty: 60 TABLET | Refills: 3 | Status: SHIPPED | OUTPATIENT
Start: 2025-07-08 | End: 2025-07-09 | Stop reason: SDUPTHER

## 2025-07-08 RX ORDER — ISOSORBIDE MONONITRATE 30 MG/1
TABLET, EXTENDED RELEASE ORAL
Qty: 270 TABLET | Refills: 3 | Status: SHIPPED | OUTPATIENT
Start: 2025-07-08 | End: 2026-07-08

## 2025-07-08 RX ORDER — NITROGLYCERIN 0.4 MG/1
0.4 TABLET SUBLINGUAL EVERY 5 MIN PRN
Qty: 30 TABLET | Refills: 5 | Status: SHIPPED | OUTPATIENT
Start: 2025-07-08

## 2025-07-08 NOTE — PROGRESS NOTES
"SUBJECTIVE:  HPI    Leatha Adames is a 89 y.o. female here for Follow-up (2 MONTH LAB).     Accompanied by 1 of her daughters today.      She is without any complaints.  She continues to have baseline dyspnea with minimal exertion.  She also has some fatigue.    She needs several medications refilled.    Curts allergies, medications, history, and problem list were updated as appropriate.    ROS:  Pertinent ROS as above, otherwise negative    OBJECTIVE:  Vital signs  Visit Vitals  /62 (BP Location: Left arm, Patient Position: Sitting)   Pulse 74   Temp 98.9 °F (37.2 °C) (Temporal)   Ht 5' 2.01" (1.575 m)   Wt 54.7 kg (120 lb 9.6 oz)   SpO2 100%   BMI 22.05 kg/m²          PHYSICAL EXAM:  General: Awake, alert, no acute distress, thin  Cardiovascular: Regular rate and rhythm with a 1-2/6 systolic murmur  Respiratory: Clear to auscultation bilaterally, normal effort  Extremities: No peripheral edema    Chemistry:  Lab Results   Component Value Date     07/03/2025    K 4.7 07/03/2025    BUN 28 (H) 07/03/2025    CREATININE 1.84 (H) 07/03/2025    EGFRNORACEVR 26 07/03/2025    CALCIUM 9.9 07/03/2025    ALKPHOS 53 07/03/2025    AST 23 07/03/2025    ALT 14 07/03/2025    MG 1.40 (L) 01/02/2025    PHOS 4.2 04/28/2025    TSH 3.910 (H) 06/24/2024    [id number] 0.89 06/24/2024        Lab Results   Component Value Date    HGBA1C 6.5 (H) 02/03/2025        Hematology:  Lab Results   Component Value Date    WBC 9.61 07/03/2025    HGB 11.4 (L) 07/03/2025    MCV 87.3 07/03/2025     07/03/2025       Lipid Panel:  Lab Results   Component Value Date    CHOL 150 11/01/2024    HDL 67 (H) 11/01/2024    LDLDIRECT 64.8 11/01/2024    TRIG 125 11/01/2024        ASSESSMENT/PLAN:  1. 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 loss  Overview:  Lab Results   Component Value Date    WBC 9.61 07/03/2025    HGB 11.4 (L) 07/03/2025    HCT 34.3 (L) 07/03/2025    MCV 87.3 07/03/2025     07/03/2025       Lab Results   Component " Value Date    UIBC 167 07/03/2025    IRON 71 07/03/2025    TRANSFERRIN 218 07/03/2025    TIBC 238 (L) 07/03/2025    LABIRON 30 07/03/2025      Lab Results   Component Value Date    FERRITIN 89.3 07/03/2025           Assessment & Plan:  Improved after IV iron    Orders:  -     CBC Auto Differential; Future; Expected date: 08/08/2025  -     Iron and TIBC; Future; Expected date: 08/08/2025  -     Ferritin; Future; Expected date: 08/08/2025    2. 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 Thyroid function test abnormal  -     TSH; Future; Expected date: 08/08/2025    5. Angina pectoris  -     isosorbide mononitrate (IMDUR) 30 MG 24 hr tablet; Take 2 tablets (60 mg total) by mouth every morning AND 1 tablet (30 mg total) every evening.  Dispense: 270 tablet; Refill: 3  -     nitroGLYCERIN (NITROSTAT) 0.4 MG SL tablet; Place 1 tablet (0.4 mg total) under the tongue every 5 (five) minutes as needed for Chest pain.  Dispense: 30 tablet; Refill: 5    6. Chronic diastolic (congestive) heart failure  -     furosemide (LASIX) 20 MG tablet; Take 1 tablet (20 mg total) by mouth 3 (three) times a week AND 0.5 tablets (10 mg total) 4 (four) times a week. Takes 40mg Mwf AND 20mg on TTSS.  Dispense: 60 tablet; Refill: 3            Follow Up:  Follow up in about 2 months (around 9/8/2025) for chronic health conditions, Fasting labs.

## 2025-07-09 ENCOUNTER — TELEPHONE (OUTPATIENT)
Dept: FAMILY MEDICINE | Facility: CLINIC | Age: 89
End: 2025-07-09
Payer: MEDICARE

## 2025-07-09 DIAGNOSIS — I50.32 CHRONIC DIASTOLIC (CONGESTIVE) HEART FAILURE: ICD-10-CM

## 2025-07-09 RX ORDER — FUROSEMIDE 20 MG/1
TABLET ORAL
Qty: 60 TABLET | Refills: 3 | Status: SHIPPED | OUTPATIENT
Start: 2025-07-09 | End: 2026-07-09

## 2025-07-21 ENCOUNTER — LAB VISIT (OUTPATIENT)
Dept: LAB | Facility: HOSPITAL | Age: 89
End: 2025-07-21
Attending: INTERNAL MEDICINE
Payer: MEDICARE

## 2025-07-21 ENCOUNTER — DOCUMENTATION ONLY (OUTPATIENT)
Facility: HOSPITAL | Age: 89
End: 2025-07-21
Payer: MEDICARE

## 2025-07-21 DIAGNOSIS — D63.1 ANEMIA DUE TO STAGE 4 CHRONIC KIDNEY DISEASE TREATED WITH ERYTHROPOIETIN: ICD-10-CM

## 2025-07-21 DIAGNOSIS — N18.4 ANEMIA DUE TO STAGE 4 CHRONIC KIDNEY DISEASE TREATED WITH ERYTHROPOIETIN: ICD-10-CM

## 2025-07-21 LAB
HCT VFR BLD AUTO: 36 % (ref 36–48)
HGB BLD-MCNC: 11.7 G/DL (ref 11.8–16)

## 2025-07-21 PROCEDURE — 36415 COLL VENOUS BLD VENIPUNCTURE: CPT

## 2025-07-21 PROCEDURE — 85014 HEMATOCRIT: CPT

## 2025-07-21 NOTE — PROGRESS NOTES
Retacrit held d/t Hgb result of 11.7, b/p 164/70 pulse 66. Pt and daughter verifies understanding of Retacrit orders and return appt.

## 2025-08-06 ENCOUNTER — TELEPHONE (OUTPATIENT)
Dept: FAMILY MEDICINE | Facility: CLINIC | Age: 89
End: 2025-08-06
Payer: MEDICARE

## 2025-08-06 DIAGNOSIS — M15.0 PRIMARY OSTEOARTHRITIS INVOLVING MULTIPLE JOINTS: ICD-10-CM

## 2025-08-06 RX ORDER — TRAMADOL HYDROCHLORIDE 50 MG/1
50 TABLET, FILM COATED ORAL EVERY 8 HOURS PRN
Qty: 30 TABLET | Refills: 0 | Status: SHIPPED | OUTPATIENT
Start: 2025-08-06

## 2025-08-13 DIAGNOSIS — E78.00 PURE HYPERCHOLESTEROLEMIA: ICD-10-CM

## 2025-08-13 RX ORDER — ATORVASTATIN CALCIUM 40 MG/1
40 TABLET, FILM COATED ORAL
Qty: 90 TABLET | Refills: 3 | Status: SHIPPED | OUTPATIENT
Start: 2025-08-13

## 2025-08-18 ENCOUNTER — INFUSION (OUTPATIENT)
Facility: HOSPITAL | Age: 89
End: 2025-08-18
Attending: INTERNAL MEDICINE
Payer: MEDICARE

## 2025-08-22 DIAGNOSIS — F33.41 RECURRENT MAJOR DEPRESSIVE DISORDER, IN PARTIAL REMISSION: ICD-10-CM

## 2025-08-22 RX ORDER — VENLAFAXINE HYDROCHLORIDE 150 MG/1
150 CAPSULE, EXTENDED RELEASE ORAL
Qty: 90 CAPSULE | Refills: 3 | Status: SHIPPED | OUTPATIENT
Start: 2025-08-22